# Patient Record
Sex: FEMALE | Race: WHITE | HISPANIC OR LATINO | Employment: OTHER | URBAN - METROPOLITAN AREA
[De-identification: names, ages, dates, MRNs, and addresses within clinical notes are randomized per-mention and may not be internally consistent; named-entity substitution may affect disease eponyms.]

---

## 2017-03-02 RX ORDER — SIMVASTATIN 20 MG/1
TABLET, FILM COATED ORAL
Qty: 90 TABLET | Refills: 0 | Status: SHIPPED | OUTPATIENT
Start: 2017-03-02 | End: 2017-05-15 | Stop reason: SDUPTHER

## 2017-03-15 ENCOUNTER — PATIENT OUTREACH (OUTPATIENT)
Dept: ADMINISTRATIVE | Facility: HOSPITAL | Age: 72
End: 2017-03-15

## 2017-03-29 ENCOUNTER — OFFICE VISIT (OUTPATIENT)
Dept: INTERNAL MEDICINE | Facility: CLINIC | Age: 72
End: 2017-03-29
Payer: MEDICARE

## 2017-03-29 VITALS
OXYGEN SATURATION: 97 % | HEIGHT: 64 IN | HEART RATE: 72 BPM | WEIGHT: 136.25 LBS | BODY MASS INDEX: 23.26 KG/M2 | SYSTOLIC BLOOD PRESSURE: 116 MMHG | DIASTOLIC BLOOD PRESSURE: 74 MMHG

## 2017-03-29 DIAGNOSIS — Z12.11 COLON CANCER SCREENING: ICD-10-CM

## 2017-03-29 DIAGNOSIS — E78.2 MIXED HYPERLIPIDEMIA: Primary | Chronic | ICD-10-CM

## 2017-03-29 DIAGNOSIS — Z12.31 ENCOUNTER FOR SCREENING MAMMOGRAM FOR BREAST CANCER: ICD-10-CM

## 2017-03-29 PROCEDURE — 99213 OFFICE O/P EST LOW 20 MIN: CPT | Mod: S$GLB,,, | Performed by: INTERNAL MEDICINE

## 2017-03-29 PROCEDURE — 99999 PR PBB SHADOW E&M-EST. PATIENT-LVL III: CPT | Mod: PBBFAC,,, | Performed by: INTERNAL MEDICINE

## 2017-03-29 PROCEDURE — 1160F RVW MEDS BY RX/DR IN RCRD: CPT | Mod: S$GLB,,, | Performed by: INTERNAL MEDICINE

## 2017-03-29 PROCEDURE — 1159F MED LIST DOCD IN RCRD: CPT | Mod: S$GLB,,, | Performed by: INTERNAL MEDICINE

## 2017-03-29 PROCEDURE — 99499 UNLISTED E&M SERVICE: CPT | Mod: S$PBB,,, | Performed by: INTERNAL MEDICINE

## 2017-03-29 PROCEDURE — 1126F AMNT PAIN NOTED NONE PRSNT: CPT | Mod: S$GLB,,, | Performed by: INTERNAL MEDICINE

## 2017-03-29 PROCEDURE — 1157F ADVNC CARE PLAN IN RCRD: CPT | Mod: S$GLB,,, | Performed by: INTERNAL MEDICINE

## 2017-03-29 RX ORDER — NAPROXEN SODIUM 220 MG
TABLET ORAL
COMMUNITY
Start: 2017-02-01 | End: 2021-01-29

## 2017-03-29 RX ORDER — LORATADINE 10 MG/1
TABLET ORAL
COMMUNITY
Start: 2017-02-20 | End: 2018-03-21

## 2017-03-29 RX ORDER — ACETAMINOPHEN 500 MG
500 TABLET ORAL EVERY 6 HOURS PRN
COMMUNITY

## 2017-03-29 NOTE — PROGRESS NOTES
Subjective:       Patient ID: Torie Richard is a 71 y.o. female.    Chief Complaint: Annual Exam    HPI Comments: Pt here for f/u. HLD has been well controlled and tolerating zocor well. Due for labs. Counseled on exercise goals. She is due for mammogram. She again declines c-scope but is willing to do stool cards. She declines other screening modalities for colon ca. Understands r/b. Still using otc sleep aids with relief. Proper use again d/w pt and she understands.      Hyperlipidemia   Pertinent negatives include no chest pain or shortness of breath.     Review of Systems   Constitutional: Negative for unexpected weight change.   Eyes: Negative for visual disturbance.   Respiratory: Negative for shortness of breath.    Cardiovascular: Negative for chest pain.   Gastrointestinal: Negative for abdominal pain and blood in stool.   Neurological: Negative for headaches.   Psychiatric/Behavioral: Negative for dysphoric mood.       Objective:      Physical Exam   Constitutional: She is oriented to person, place, and time. She appears well-developed and well-nourished.   HENT:   Head: Normocephalic and atraumatic.   Neck: Neck supple. No thyromegaly present.   Cardiovascular: Normal rate, regular rhythm and normal heart sounds.    Pulmonary/Chest: Effort normal and breath sounds normal.   Abdominal: Soft. Bowel sounds are normal. She exhibits no distension and no mass. There is no tenderness.   Musculoskeletal: She exhibits no edema.   Lymphadenopathy:     She has no cervical adenopathy.   Neurological: She is alert and oriented to person, place, and time. No cranial nerve deficit.   Psychiatric: She has a normal mood and affect. Her behavior is normal.       Assessment:       1. Mixed hyperlipidemia    2. Encounter for screening mammogram for breast cancer    3. Colon cancer screening        Plan:       1. appropriate labs    2. Mammogram  3. Pt declines c-scope but will do stool cards.

## 2017-04-03 ENCOUNTER — PATIENT MESSAGE (OUTPATIENT)
Dept: ADMINISTRATIVE | Facility: HOSPITAL | Age: 72
End: 2017-04-03

## 2017-04-03 ENCOUNTER — LAB VISIT (OUTPATIENT)
Dept: LAB | Facility: OTHER | Age: 72
End: 2017-04-03
Attending: INTERNAL MEDICINE
Payer: MEDICARE

## 2017-04-03 DIAGNOSIS — Z12.11 COLON CANCER SCREENING: ICD-10-CM

## 2017-04-03 PROCEDURE — 82272 OCCULT BLD FECES 1-3 TESTS: CPT | Mod: 91

## 2017-04-04 LAB
OB PNL STL: NEGATIVE

## 2017-05-15 RX ORDER — SIMVASTATIN 20 MG/1
20 TABLET, FILM COATED ORAL NIGHTLY
Qty: 90 TABLET | Refills: 3 | Status: SHIPPED | OUTPATIENT
Start: 2017-05-15 | End: 2018-05-06 | Stop reason: SDUPTHER

## 2017-05-16 ENCOUNTER — HOSPITAL ENCOUNTER (OUTPATIENT)
Dept: RADIOLOGY | Facility: OTHER | Age: 72
Discharge: HOME | End: 2017-05-16
Attending: INTERNAL MEDICINE
Payer: MEDICARE

## 2017-05-16 DIAGNOSIS — Z12.31 ENCOUNTER FOR SCREENING MAMMOGRAM FOR BREAST CANCER: ICD-10-CM

## 2017-05-16 PROCEDURE — 77067 SCR MAMMO BI INCL CAD: CPT | Mod: 26,,, | Performed by: RADIOLOGY

## 2017-05-16 PROCEDURE — 77067 SCR MAMMO BI INCL CAD: CPT | Mod: TC

## 2017-10-25 ENCOUNTER — PATIENT MESSAGE (OUTPATIENT)
Dept: INTERNAL MEDICINE | Facility: CLINIC | Age: 72
End: 2017-10-25

## 2018-03-14 ENCOUNTER — PATIENT OUTREACH (OUTPATIENT)
Dept: ADMINISTRATIVE | Facility: HOSPITAL | Age: 73
End: 2018-03-14

## 2018-03-14 NOTE — PROGRESS NOTES
[facility] is committed to your overall health.  To help you get the most out of each of your visits, we will review your information to make sure you are up to date on all of your recommended tests and/or procedures.       Your PCP  Bartolo Burciaga MD   found that you may be due for:       Health Maintenance Due   Topic Date Due    TETANUS VACCINE  07/13/1965    Zoster Vaccine  07/13/2007     Medicare does not cover all immunizations to be given in the clinic. Check your benefits to ensure that you do not need to receive your immunizations at the pharmacy.          If you have had any of the above done at another facility, please bring the records or information with you so that your record at [facility] will be complete.  If you would like to schedule any of these, please contact me.     If you are currently taking medication, please bring it with you to your appointment for review.     Also, if you have any type of Advanced Directives, please bring them with you to your office visit so we may scan them into your chart.       Thank you for Choosing [facility] for your healthcare needs.        Additional Information  If you have questions, you can email [email or web address] or call [phone]  to talk to our [facility] staff. Remember, [facility] is NOT to be used for urgent needs. For medical emergencies, dial 911.

## 2018-03-21 ENCOUNTER — DOCUMENTATION ONLY (OUTPATIENT)
Dept: INTERNAL MEDICINE | Facility: CLINIC | Age: 73
End: 2018-03-21

## 2018-03-21 ENCOUNTER — OFFICE VISIT (OUTPATIENT)
Dept: INTERNAL MEDICINE | Facility: CLINIC | Age: 73
End: 2018-03-21
Payer: MEDICARE

## 2018-03-21 VITALS
BODY MASS INDEX: 23.93 KG/M2 | HEART RATE: 62 BPM | WEIGHT: 140.19 LBS | SYSTOLIC BLOOD PRESSURE: 162 MMHG | DIASTOLIC BLOOD PRESSURE: 90 MMHG | HEIGHT: 64 IN

## 2018-03-21 DIAGNOSIS — Z12.12 SCREENING FOR COLORECTAL CANCER: ICD-10-CM

## 2018-03-21 DIAGNOSIS — E78.2 MIXED HYPERLIPIDEMIA: Primary | Chronic | ICD-10-CM

## 2018-03-21 DIAGNOSIS — Z12.39 SCREENING FOR BREAST CANCER: ICD-10-CM

## 2018-03-21 DIAGNOSIS — Z12.11 SCREENING FOR COLORECTAL CANCER: ICD-10-CM

## 2018-03-21 PROCEDURE — 99999 PR PBB SHADOW E&M-EST. PATIENT-LVL III: CPT | Mod: PBBFAC,,, | Performed by: INTERNAL MEDICINE

## 2018-03-21 PROCEDURE — 99213 OFFICE O/P EST LOW 20 MIN: CPT | Mod: S$GLB,,, | Performed by: INTERNAL MEDICINE

## 2018-03-21 PROCEDURE — 99499 UNLISTED E&M SERVICE: CPT | Mod: S$GLB,,, | Performed by: INTERNAL MEDICINE

## 2018-03-21 NOTE — PROGRESS NOTES
Subjective:       Patient ID: Torie Richard is a 72 y.o. female.    Chief Complaint: Annual Exam    Pt here for f/u. HLD has been well controlled and tolerating zocor well. Due for labs.     Counseled on exercise goals. She is due for mammogram. She again declines c-scope and other screening modalities for colon ca but is willing to do Fitkit. Understands r/b.     Still using otc sleep aids with relief. Proper use again d/w pt and she understands.    BP is elevated today. Has occasionally been elevated in the past but not consistently. She is not checking at home but will begin to do so. Denies cp/sob/ha/vision or neuro changes.         Hyperlipidemia   Pertinent negatives include no chest pain or shortness of breath.     Review of Systems   Constitutional: Negative for unexpected weight change.   Eyes: Negative for visual disturbance.   Respiratory: Negative for shortness of breath.    Cardiovascular: Negative for chest pain.   Gastrointestinal: Negative for abdominal pain and blood in stool.   Neurological: Negative for headaches.   Psychiatric/Behavioral: Negative for dysphoric mood.       Objective:      Physical Exam   Constitutional: She is oriented to person, place, and time. She appears well-developed and well-nourished.   HENT:   Head: Normocephalic and atraumatic.   Neck: Neck supple. No thyromegaly present.   Cardiovascular: Normal rate, regular rhythm and normal heart sounds.    Pulmonary/Chest: Effort normal and breath sounds normal.   Abdominal: Soft. Bowel sounds are normal. She exhibits no distension and no mass. There is no tenderness.   Musculoskeletal: She exhibits no edema.   Lymphadenopathy:     She has no cervical adenopathy.   Neurological: She is alert and oriented to person, place, and time. No cranial nerve deficit.   Psychiatric: She has a normal mood and affect. Her behavior is normal.       Assessment:       1. Mixed hyperlipidemia    2. Screening for colorectal cancer    3.  Screening for breast cancer        Plan:       1. appropriate labs    2. Mammogram  3. Fitkit  4. Home BP monitoring and f/u 2 wks nursing BP check; anticipate needing to start lisinopril at that time

## 2018-03-22 ENCOUNTER — PATIENT MESSAGE (OUTPATIENT)
Dept: INTERNAL MEDICINE | Facility: CLINIC | Age: 73
End: 2018-03-22

## 2018-03-23 ENCOUNTER — LAB VISIT (OUTPATIENT)
Dept: LAB | Facility: HOSPITAL | Age: 73
End: 2018-03-23
Attending: INTERNAL MEDICINE
Payer: MEDICARE

## 2018-03-23 DIAGNOSIS — Z12.11 SCREENING FOR COLORECTAL CANCER: ICD-10-CM

## 2018-03-23 DIAGNOSIS — Z12.12 SCREENING FOR COLORECTAL CANCER: ICD-10-CM

## 2018-03-23 LAB — HEMOCCULT STL QL IA: NEGATIVE

## 2018-03-23 PROCEDURE — 82274 ASSAY TEST FOR BLOOD FECAL: CPT

## 2018-04-05 ENCOUNTER — PATIENT MESSAGE (OUTPATIENT)
Dept: INTERNAL MEDICINE | Facility: CLINIC | Age: 73
End: 2018-04-05

## 2018-04-05 RX ORDER — LISINOPRIL 10 MG/1
10 TABLET ORAL DAILY
Qty: 90 TABLET | Refills: 3 | Status: SHIPPED | OUTPATIENT
Start: 2018-04-05 | End: 2019-03-14

## 2018-04-19 ENCOUNTER — CLINICAL SUPPORT (OUTPATIENT)
Dept: INTERNAL MEDICINE | Facility: CLINIC | Age: 73
End: 2018-04-19
Payer: MEDICARE

## 2018-04-19 VITALS — OXYGEN SATURATION: 98 % | SYSTOLIC BLOOD PRESSURE: 132 MMHG | DIASTOLIC BLOOD PRESSURE: 76 MMHG | HEART RATE: 63 BPM

## 2018-04-19 PROCEDURE — 99999 PR PBB SHADOW E&M-EST. PATIENT-LVL II: CPT | Mod: PBBFAC,,,

## 2018-04-19 NOTE — PROGRESS NOTES
Torie Richard 72 y.o. female is here today for Blood Pressure check.   History of HTN yes.    Review of patient's allergies indicates:   Allergen Reactions    Meloxicam Rash     Flushing       Creatinine   Date Value Ref Range Status   05/16/2017 0.8 0.5 - 1.4 mg/dL Final     Sodium   Date Value Ref Range Status   05/16/2017 141 136 - 145 mmol/L Final     Potassium   Date Value Ref Range Status   05/16/2017 4.7 3.5 - 5.1 mmol/L Final   ]  Patient verifies taking blood pressure medications on a regular bases at the same time of the day.     Current Outpatient Prescriptions:     acetaminophen (TYLENOL) 500 MG tablet, Take 500 mg by mouth every 6 (six) hours as needed for Pain., Disp: , Rfl:     doxylamine succinate (SLEEP AID, DOXYLAMINE,) 25 mg tablet, Take 25 mg by mouth nightly as needed.  , Disp: , Rfl:     ibuprofen (ADVIL,MOTRIN) 200 MG tablet, Take 200 mg by mouth every 6 (six) hours as needed., Disp: , Rfl:     lisinopril 10 MG tablet, Take 1 tablet (10 mg total) by mouth once daily., Disp: 90 tablet, Rfl: 3    melatonin 5 mg Tab, Take 5 mg by mouth. , Disp: , Rfl:     naproxen sodium (ANAPROX) 220 MG tablet, , Disp: , Rfl:     simvastatin (ZOCOR) 20 MG tablet, Take 1 tablet (20 mg total) by mouth every evening., Disp: 90 tablet, Rfl: 3  Does patient have record of home blood pressure readings yes. Readings have been averaging 126/70.   Last dose of blood pressure medication was taken at 730am.  Patient is asymptomatic.       BP: 132/76 , Pulse: 63.      Dr. Burciaga notified.   Pt comes in for bp check and bp is within normal range

## 2018-04-23 ENCOUNTER — LAB VISIT (OUTPATIENT)
Dept: LAB | Facility: OTHER | Age: 73
End: 2018-04-23
Attending: INTERNAL MEDICINE
Payer: MEDICARE

## 2018-04-23 DIAGNOSIS — E78.2 MIXED HYPERLIPIDEMIA: Chronic | ICD-10-CM

## 2018-04-23 LAB
ALBUMIN SERPL BCP-MCNC: 4 G/DL
ALP SERPL-CCNC: 49 U/L
ALT SERPL W/O P-5'-P-CCNC: 16 U/L
ANION GAP SERPL CALC-SCNC: 8 MMOL/L
AST SERPL-CCNC: 16 U/L
BILIRUB SERPL-MCNC: 0.5 MG/DL
BUN SERPL-MCNC: 10 MG/DL
CALCIUM SERPL-MCNC: 9.7 MG/DL
CHLORIDE SERPL-SCNC: 107 MMOL/L
CHOLEST SERPL-MCNC: 160 MG/DL
CHOLEST/HDLC SERPL: 2.7 {RATIO}
CO2 SERPL-SCNC: 26 MMOL/L
CREAT SERPL-MCNC: 0.8 MG/DL
EST. GFR  (AFRICAN AMERICAN): >60 ML/MIN/1.73 M^2
EST. GFR  (NON AFRICAN AMERICAN): >60 ML/MIN/1.73 M^2
GLUCOSE SERPL-MCNC: 102 MG/DL
HDLC SERPL-MCNC: 60 MG/DL
HDLC SERPL: 37.5 %
LDLC SERPL CALC-MCNC: 83 MG/DL
NONHDLC SERPL-MCNC: 100 MG/DL
POTASSIUM SERPL-SCNC: 4.2 MMOL/L
PROT SERPL-MCNC: 6.6 G/DL
SODIUM SERPL-SCNC: 141 MMOL/L
TRIGL SERPL-MCNC: 85 MG/DL

## 2018-04-23 PROCEDURE — 36415 COLL VENOUS BLD VENIPUNCTURE: CPT

## 2018-04-23 PROCEDURE — 80053 COMPREHEN METABOLIC PANEL: CPT

## 2018-04-23 PROCEDURE — 80061 LIPID PANEL: CPT

## 2018-05-06 RX ORDER — SIMVASTATIN 20 MG/1
20 TABLET, FILM COATED ORAL NIGHTLY
Qty: 90 TABLET | Refills: 3 | Status: SHIPPED | OUTPATIENT
Start: 2018-05-06 | End: 2019-04-25 | Stop reason: SDUPTHER

## 2018-05-17 ENCOUNTER — HOSPITAL ENCOUNTER (OUTPATIENT)
Dept: RADIOLOGY | Facility: OTHER | Age: 73
Discharge: HOME | End: 2018-05-17
Attending: INTERNAL MEDICINE
Payer: MEDICARE

## 2018-05-17 VITALS — BODY MASS INDEX: 23.9 KG/M2 | WEIGHT: 140 LBS | HEIGHT: 64 IN

## 2018-05-17 DIAGNOSIS — Z12.39 SCREENING FOR BREAST CANCER: ICD-10-CM

## 2018-05-17 PROCEDURE — 77063 BREAST TOMOSYNTHESIS BI: CPT | Mod: 26,,, | Performed by: RADIOLOGY

## 2018-05-17 PROCEDURE — 77067 SCR MAMMO BI INCL CAD: CPT | Mod: 26,,, | Performed by: RADIOLOGY

## 2018-05-17 PROCEDURE — 77067 SCR MAMMO BI INCL CAD: CPT | Mod: TC

## 2019-01-29 ENCOUNTER — PATIENT MESSAGE (OUTPATIENT)
Dept: INTERNAL MEDICINE | Facility: CLINIC | Age: 74
End: 2019-01-29

## 2019-02-03 ENCOUNTER — PATIENT MESSAGE (OUTPATIENT)
Dept: INTERNAL MEDICINE | Facility: CLINIC | Age: 74
End: 2019-02-03

## 2019-02-03 DIAGNOSIS — L98.9 SKIN LESIONS: Primary | ICD-10-CM

## 2019-02-05 ENCOUNTER — PATIENT MESSAGE (OUTPATIENT)
Dept: INTERNAL MEDICINE | Facility: CLINIC | Age: 74
End: 2019-02-05

## 2019-02-14 ENCOUNTER — INITIAL CONSULT (OUTPATIENT)
Dept: DERMATOLOGY | Facility: CLINIC | Age: 74
End: 2019-02-14
Payer: MEDICARE

## 2019-02-14 DIAGNOSIS — L24.9 IRRITANT CONTACT DERMATITIS, UNSPECIFIED TRIGGER: Primary | ICD-10-CM

## 2019-02-14 DIAGNOSIS — D36.10 NEUROFIBROMA: ICD-10-CM

## 2019-02-14 PROCEDURE — 1101F PT FALLS ASSESS-DOCD LE1/YR: CPT | Mod: CPTII,S$GLB,, | Performed by: DERMATOLOGY

## 2019-02-14 PROCEDURE — 1101F PR PT FALLS ASSESS DOC 0-1 FALLS W/OUT INJ PAST YR: ICD-10-PCS | Mod: CPTII,S$GLB,, | Performed by: DERMATOLOGY

## 2019-02-14 PROCEDURE — 99999 PR PBB SHADOW E&M-EST. PATIENT-LVL II: CPT | Mod: PBBFAC,,, | Performed by: DERMATOLOGY

## 2019-02-14 PROCEDURE — 99202 OFFICE O/P NEW SF 15 MIN: CPT | Mod: S$GLB,,, | Performed by: DERMATOLOGY

## 2019-02-14 PROCEDURE — 99999 PR PBB SHADOW E&M-EST. PATIENT-LVL II: ICD-10-PCS | Mod: PBBFAC,,, | Performed by: DERMATOLOGY

## 2019-02-14 PROCEDURE — 99202 PR OFFICE/OUTPT VISIT, NEW, LEVL II, 15-29 MIN: ICD-10-PCS | Mod: S$GLB,,, | Performed by: DERMATOLOGY

## 2019-02-14 RX ORDER — ALCLOMETASONE DIPROPIONATE 0.5 MG/G
OINTMENT TOPICAL
Qty: 30 G | Refills: 3 | Status: SHIPPED | OUTPATIENT
Start: 2019-02-14 | End: 2019-05-05

## 2019-02-14 NOTE — LETTER
February 14, 2019      Bartolo Burciaga MD  [address]  [facility] [address]           [facility]ermat[facility]  [address]  [facility] [address]  Phone: [phone]  Fax: [phone]          Patient: Torie Richard   MR Number: [id number]   YOB: 1945   Date of Visit: 2/14/2019       Dear Dr. Bartolo Burciaga:    Thank you for referring Torie Richard to me for evaluation. Attached you will find relevant portions of my assessment and plan of care.    If you have questions, please do not hesitate to call me. I look forward to following Torie Richard along with you.    Sincerely,    Tierney Peña MD    Enclosure  CC:  No Recipients    If you would like to receive this communication electronically, please contact [email or web address] or [phone] to request more information on [employer] access.    For providers and/or their staff who would like to refer a patient to [facility], please contact us through our one-stop-shop provider referral line, [facility], at [phone].    If you feel you have received this communication in error or would no longer like to receive these types of communications, please e-mail [email or web address]

## 2019-02-14 NOTE — PATIENT INSTRUCTIONS
It is recommended that patient discontinue all lip balms and use only Vaseline petroleum jelly applied frequently to lips.

## 2019-03-08 ENCOUNTER — TELEPHONE (OUTPATIENT)
Dept: INTERNAL MEDICINE | Facility: CLINIC | Age: 74
End: 2019-03-08

## 2019-03-08 PROBLEM — I10 ESSENTIAL HYPERTENSION, BENIGN: Status: ACTIVE | Noted: 2019-03-08

## 2019-03-13 ENCOUNTER — PES CALL (OUTPATIENT)
Dept: ADMINISTRATIVE | Facility: CLINIC | Age: 74
End: 2019-03-13

## 2019-03-14 ENCOUNTER — LAB VISIT (OUTPATIENT)
Dept: LAB | Facility: OTHER | Age: 74
End: 2019-03-14
Attending: INTERNAL MEDICINE
Payer: MEDICARE

## 2019-03-14 ENCOUNTER — OFFICE VISIT (OUTPATIENT)
Dept: INTERNAL MEDICINE | Facility: CLINIC | Age: 74
End: 2019-03-14
Payer: MEDICARE

## 2019-03-14 VITALS
WEIGHT: 137.13 LBS | BODY MASS INDEX: 23.41 KG/M2 | DIASTOLIC BLOOD PRESSURE: 74 MMHG | SYSTOLIC BLOOD PRESSURE: 120 MMHG | HEART RATE: 64 BPM | HEIGHT: 64 IN

## 2019-03-14 DIAGNOSIS — E78.2 MIXED HYPERLIPIDEMIA: Chronic | ICD-10-CM

## 2019-03-14 DIAGNOSIS — I10 ESSENTIAL HYPERTENSION, BENIGN: ICD-10-CM

## 2019-03-14 DIAGNOSIS — Z12.11 COLON CANCER SCREENING: ICD-10-CM

## 2019-03-14 DIAGNOSIS — I10 ESSENTIAL HYPERTENSION, BENIGN: Primary | ICD-10-CM

## 2019-03-14 LAB
ALBUMIN SERPL BCP-MCNC: 4.1 G/DL
ALP SERPL-CCNC: 57 U/L
ALT SERPL W/O P-5'-P-CCNC: 15 U/L
ANION GAP SERPL CALC-SCNC: 8 MMOL/L
AST SERPL-CCNC: 21 U/L
BASOPHILS # BLD AUTO: 0.01 K/UL
BASOPHILS NFR BLD: 0.2 %
BILIRUB SERPL-MCNC: 0.6 MG/DL
BUN SERPL-MCNC: 12 MG/DL
CALCIUM SERPL-MCNC: 9.5 MG/DL
CHLORIDE SERPL-SCNC: 106 MMOL/L
CHOLEST SERPL-MCNC: 156 MG/DL
CHOLEST/HDLC SERPL: 2.6 {RATIO}
CO2 SERPL-SCNC: 25 MMOL/L
CREAT SERPL-MCNC: 0.8 MG/DL
DIFFERENTIAL METHOD: ABNORMAL
EOSINOPHIL # BLD AUTO: 0.2 K/UL
EOSINOPHIL NFR BLD: 4.4 %
ERYTHROCYTE [DISTWIDTH] IN BLOOD BY AUTOMATED COUNT: 12.9 %
EST. GFR  (AFRICAN AMERICAN): >60 ML/MIN/1.73 M^2
EST. GFR  (NON AFRICAN AMERICAN): >60 ML/MIN/1.73 M^2
GLUCOSE SERPL-MCNC: 85 MG/DL
HCT VFR BLD AUTO: 40 %
HDLC SERPL-MCNC: 60 MG/DL
HDLC SERPL: 38.5 %
HGB BLD-MCNC: 13.2 G/DL
LDLC SERPL CALC-MCNC: 78 MG/DL
LYMPHOCYTES # BLD AUTO: 2 K/UL
LYMPHOCYTES NFR BLD: 39.4 %
MCH RBC QN AUTO: 31.1 PG
MCHC RBC AUTO-ENTMCNC: 33 G/DL
MCV RBC AUTO: 94 FL
MONOCYTES # BLD AUTO: 0.4 K/UL
MONOCYTES NFR BLD: 8.8 %
NEUTROPHILS # BLD AUTO: 2.4 K/UL
NEUTROPHILS NFR BLD: 47.2 %
NONHDLC SERPL-MCNC: 96 MG/DL
PLATELET # BLD AUTO: 269 K/UL
PMV BLD AUTO: 10.2 FL
POTASSIUM SERPL-SCNC: 4.6 MMOL/L
PROT SERPL-MCNC: 6.8 G/DL
RBC # BLD AUTO: 4.25 M/UL
SODIUM SERPL-SCNC: 139 MMOL/L
TRIGL SERPL-MCNC: 90 MG/DL
WBC # BLD AUTO: 5 K/UL

## 2019-03-14 PROCEDURE — 99999 PR PBB SHADOW E&M-EST. PATIENT-LVL III: CPT | Mod: PBBFAC,,, | Performed by: INTERNAL MEDICINE

## 2019-03-14 PROCEDURE — 99499 UNLISTED E&M SERVICE: CPT | Mod: S$GLB,,, | Performed by: INTERNAL MEDICINE

## 2019-03-14 PROCEDURE — 85025 COMPLETE CBC W/AUTO DIFF WBC: CPT

## 2019-03-14 PROCEDURE — 36415 COLL VENOUS BLD VENIPUNCTURE: CPT

## 2019-03-14 PROCEDURE — 99999 PR PBB SHADOW E&M-EST. PATIENT-LVL III: ICD-10-PCS | Mod: PBBFAC,,, | Performed by: INTERNAL MEDICINE

## 2019-03-14 PROCEDURE — 3078F DIAST BP <80 MM HG: CPT | Mod: CPTII,S$GLB,, | Performed by: INTERNAL MEDICINE

## 2019-03-14 PROCEDURE — 99214 PR OFFICE/OUTPT VISIT, EST, LEVL IV, 30-39 MIN: ICD-10-PCS | Mod: S$GLB,,, | Performed by: INTERNAL MEDICINE

## 2019-03-14 PROCEDURE — 3074F SYST BP LT 130 MM HG: CPT | Mod: CPTII,S$GLB,, | Performed by: INTERNAL MEDICINE

## 2019-03-14 PROCEDURE — 99499 RISK ADDL DX/OHS AUDIT: ICD-10-PCS | Mod: S$GLB,,, | Performed by: INTERNAL MEDICINE

## 2019-03-14 PROCEDURE — 99214 OFFICE O/P EST MOD 30 MIN: CPT | Mod: S$GLB,,, | Performed by: INTERNAL MEDICINE

## 2019-03-14 PROCEDURE — 1101F PR PT FALLS ASSESS DOC 0-1 FALLS W/OUT INJ PAST YR: ICD-10-PCS | Mod: CPTII,S$GLB,, | Performed by: INTERNAL MEDICINE

## 2019-03-14 PROCEDURE — 3074F PR MOST RECENT SYSTOLIC BLOOD PRESSURE < 130 MM HG: ICD-10-PCS | Mod: CPTII,S$GLB,, | Performed by: INTERNAL MEDICINE

## 2019-03-14 PROCEDURE — 80061 LIPID PANEL: CPT

## 2019-03-14 PROCEDURE — 80053 COMPREHEN METABOLIC PANEL: CPT

## 2019-03-14 PROCEDURE — 3078F PR MOST RECENT DIASTOLIC BLOOD PRESSURE < 80 MM HG: ICD-10-PCS | Mod: CPTII,S$GLB,, | Performed by: INTERNAL MEDICINE

## 2019-03-14 PROCEDURE — 1101F PT FALLS ASSESS-DOCD LE1/YR: CPT | Mod: CPTII,S$GLB,, | Performed by: INTERNAL MEDICINE

## 2019-03-14 RX ORDER — LOSARTAN POTASSIUM 25 MG/1
25 TABLET ORAL DAILY
Qty: 90 TABLET | Refills: 3 | Status: SHIPPED | OUTPATIENT
Start: 2019-03-14 | End: 2020-04-03

## 2019-03-14 NOTE — PROGRESS NOTES
Subjective:       Patient ID: Torie Richard is a 73 y.o. female.    Chief Complaint: Annual Exam    Pt here for f/u. HLD has been well controlled and tolerating zocor well. Due for labs.     Counseled on exercise goals. She again declines c-scope and other screening modalities for colon ca but is willing to do Fitkit. Understands r/b.     Still using otc sleep aids with relief. Proper use again d/w pt and she understands.    Pt's BP is well controlled. Tolerating meds well. Pt denies cp/sob/ha/vision or neuro changes. Checking at home and is well controlled.           Hyperlipidemia   Pertinent negatives include no chest pain or shortness of breath.     Review of Systems   Constitutional: Negative for activity change and unexpected weight change.   HENT: Negative for hearing loss, rhinorrhea and trouble swallowing.    Eyes: Negative for discharge and visual disturbance.   Respiratory: Negative for chest tightness, shortness of breath and wheezing.    Cardiovascular: Negative for chest pain and palpitations.   Gastrointestinal: Negative for abdominal pain, blood in stool, constipation, diarrhea and vomiting.   Endocrine: Negative for polydipsia and polyuria.   Genitourinary: Negative for difficulty urinating, dysuria, hematuria and menstrual problem.   Musculoskeletal: Negative for arthralgias, joint swelling and neck pain.   Neurological: Negative for weakness and headaches.   Psychiatric/Behavioral: Negative for confusion and dysphoric mood.       Objective:      Physical Exam   Constitutional: She is oriented to person, place, and time. She appears well-developed and well-nourished.   HENT:   Head: Normocephalic and atraumatic.   Neck: Neck supple. No thyromegaly present.   Cardiovascular: Normal rate, regular rhythm and normal heart sounds.   Pulmonary/Chest: Effort normal and breath sounds normal.   Abdominal: Soft. Bowel sounds are normal. She exhibits no distension and no mass. There is no tenderness.    Musculoskeletal: She exhibits no edema.   Lymphadenopathy:     She has no cervical adenopathy.   Neurological: She is alert and oriented to person, place, and time. No cranial nerve deficit.   Psychiatric: She has a normal mood and affect. Her behavior is normal.       Assessment:       1. Essential hypertension, benign    2. Mixed hyperlipidemia    3. Colon cancer screening        Plan:       1. appropriate labs    2. Change lisinopril to losartan considering recent study linking ACEI and lung ca  3. Fitkit  4. Home BP monitoring

## 2019-03-15 ENCOUNTER — LAB VISIT (OUTPATIENT)
Dept: LAB | Facility: OTHER | Age: 74
End: 2019-03-15
Attending: INTERNAL MEDICINE
Payer: MEDICARE

## 2019-03-15 DIAGNOSIS — Z12.11 COLON CANCER SCREENING: ICD-10-CM

## 2019-03-15 PROCEDURE — 82274 ASSAY TEST FOR BLOOD FECAL: CPT

## 2019-03-19 RX ORDER — LISINOPRIL 10 MG/1
10 TABLET ORAL DAILY
Qty: 90 TABLET | Refills: 3 | OUTPATIENT
Start: 2019-03-19 | End: 2020-03-18

## 2019-03-21 LAB — HEMOCCULT STL QL IA: NEGATIVE

## 2019-04-25 RX ORDER — SIMVASTATIN 20 MG/1
20 TABLET, FILM COATED ORAL NIGHTLY
Qty: 90 TABLET | Refills: 3 | Status: SHIPPED | OUTPATIENT
Start: 2019-04-25 | End: 2020-05-19

## 2019-05-05 ENCOUNTER — HOSPITAL ENCOUNTER (INPATIENT)
Facility: HOSPITAL | Age: 74
LOS: 6 days | Discharge: HOME | DRG: 418 | End: 2019-05-11
Attending: EMERGENCY MEDICINE | Admitting: SURGERY
Payer: MEDICARE

## 2019-05-05 ENCOUNTER — OFFICE VISIT (OUTPATIENT)
Dept: URGENT CARE | Facility: CLINIC | Age: 74
End: 2019-05-05
Payer: MEDICARE

## 2019-05-05 VITALS
TEMPERATURE: 99 F | DIASTOLIC BLOOD PRESSURE: 88 MMHG | SYSTOLIC BLOOD PRESSURE: 189 MMHG | RESPIRATION RATE: 16 BRPM | WEIGHT: 137 LBS | HEART RATE: 87 BPM | HEIGHT: 64 IN | BODY MASS INDEX: 23.39 KG/M2 | OXYGEN SATURATION: 98 %

## 2019-05-05 DIAGNOSIS — E78.2 MIXED HYPERLIPIDEMIA: Chronic | ICD-10-CM

## 2019-05-05 DIAGNOSIS — R10.13 EPIGASTRIC ABDOMINAL PAIN: ICD-10-CM

## 2019-05-05 DIAGNOSIS — K80.33 CALCULUS OF BILE DUCT WITH ACUTE CHOLANGITIS WITH OBSTRUCTION: ICD-10-CM

## 2019-05-05 DIAGNOSIS — K80.51 CHOLEDOCHOLITHIASIS WITH OBSTRUCTION: ICD-10-CM

## 2019-05-05 DIAGNOSIS — R74.01 TRANSAMINITIS: ICD-10-CM

## 2019-05-05 DIAGNOSIS — K83.1 BILIARY OBSTRUCTION: Primary | ICD-10-CM

## 2019-05-05 DIAGNOSIS — R10.11 RIGHT UPPER QUADRANT ABDOMINAL PAIN: Primary | ICD-10-CM

## 2019-05-05 DIAGNOSIS — I10 ESSENTIAL HYPERTENSION, BENIGN: ICD-10-CM

## 2019-05-05 DIAGNOSIS — R93.5 ABNORMAL US (ULTRASOUND) OF ABDOMEN: ICD-10-CM

## 2019-05-05 LAB
ALBUMIN SERPL BCP-MCNC: 3.7 G/DL (ref 3.5–5.2)
ALP SERPL-CCNC: 301 U/L (ref 55–135)
ALT SERPL W/O P-5'-P-CCNC: 775 U/L (ref 10–44)
ANION GAP SERPL CALC-SCNC: 10 MMOL/L (ref 8–16)
AST SERPL-CCNC: 292 U/L (ref 10–40)
BACTERIA #/AREA URNS AUTO: ABNORMAL /HPF
BASOPHILS # BLD AUTO: 0.04 K/UL (ref 0–0.2)
BASOPHILS NFR BLD: 0.5 % (ref 0–1.9)
BILIRUB SERPL-MCNC: 5.4 MG/DL (ref 0.1–1)
BILIRUB UR QL STRIP: ABNORMAL
BUN SERPL-MCNC: 13 MG/DL (ref 8–23)
CALCIUM SERPL-MCNC: 9.5 MG/DL (ref 8.7–10.5)
CHLORIDE SERPL-SCNC: 109 MMOL/L (ref 95–110)
CLARITY UR REFRACT.AUTO: CLEAR
CO2 SERPL-SCNC: 21 MMOL/L (ref 23–29)
COLOR UR AUTO: ABNORMAL
CREAT SERPL-MCNC: 0.7 MG/DL (ref 0.5–1.4)
DIFFERENTIAL METHOD: ABNORMAL
EOSINOPHIL # BLD AUTO: 0.3 K/UL (ref 0–0.5)
EOSINOPHIL NFR BLD: 3.5 % (ref 0–8)
ERYTHROCYTE [DISTWIDTH] IN BLOOD BY AUTOMATED COUNT: 12.9 % (ref 11.5–14.5)
EST. GFR  (AFRICAN AMERICAN): >60 ML/MIN/1.73 M^2
EST. GFR  (NON AFRICAN AMERICAN): >60 ML/MIN/1.73 M^2
GLUCOSE SERPL-MCNC: 95 MG/DL (ref 70–110)
GLUCOSE UR QL STRIP: NEGATIVE
HCT VFR BLD AUTO: 44.2 % (ref 37–48.5)
HGB BLD-MCNC: 15 G/DL (ref 12–16)
HGB UR QL STRIP: ABNORMAL
HYALINE CASTS UR QL AUTO: 0 /LPF
IMM GRANULOCYTES # BLD AUTO: 0.03 K/UL (ref 0–0.04)
IMM GRANULOCYTES NFR BLD AUTO: 0.4 % (ref 0–0.5)
KETONES UR QL STRIP: NEGATIVE
LEUKOCYTE ESTERASE UR QL STRIP: NEGATIVE
LIPASE SERPL-CCNC: 69 U/L (ref 4–60)
LYMPHOCYTES # BLD AUTO: 1.7 K/UL (ref 1–4.8)
LYMPHOCYTES NFR BLD: 21.7 % (ref 18–48)
MCH RBC QN AUTO: 31.3 PG (ref 27–31)
MCHC RBC AUTO-ENTMCNC: 33.9 G/DL (ref 32–36)
MCV RBC AUTO: 92 FL (ref 82–98)
MICROSCOPIC COMMENT: ABNORMAL
MONOCYTES # BLD AUTO: 0.7 K/UL (ref 0.3–1)
MONOCYTES NFR BLD: 8.9 % (ref 4–15)
NEUTROPHILS # BLD AUTO: 5.1 K/UL (ref 1.8–7.7)
NEUTROPHILS NFR BLD: 65 % (ref 38–73)
NITRITE UR QL STRIP: NEGATIVE
NRBC BLD-RTO: 0 /100 WBC
PH UR STRIP: 5 [PH] (ref 5–8)
PLATELET # BLD AUTO: 320 K/UL (ref 150–350)
PMV BLD AUTO: 10.7 FL (ref 9.2–12.9)
POTASSIUM SERPL-SCNC: 3.4 MMOL/L (ref 3.5–5.1)
PROT SERPL-MCNC: 6.8 G/DL (ref 6–8.4)
PROT UR QL STRIP: ABNORMAL
RBC # BLD AUTO: 4.8 M/UL (ref 4–5.4)
RBC #/AREA URNS AUTO: 7 /HPF (ref 0–4)
SODIUM SERPL-SCNC: 140 MMOL/L (ref 136–145)
SP GR UR STRIP: >=1.03 (ref 1–1.03)
SQUAMOUS #/AREA URNS AUTO: 1 /HPF
TROPONIN I SERPL DL<=0.01 NG/ML-MCNC: <0.006 NG/ML (ref 0–0.03)
URN SPEC COLLECT METH UR: ABNORMAL
WBC # BLD AUTO: 7.78 K/UL (ref 3.9–12.7)
WBC #/AREA URNS AUTO: 5 /HPF (ref 0–5)

## 2019-05-05 PROCEDURE — 96374 THER/PROPH/DIAG INJ IV PUSH: CPT

## 2019-05-05 PROCEDURE — 99214 OFFICE O/P EST MOD 30 MIN: CPT | Mod: S$GLB,,, | Performed by: INTERNAL MEDICINE

## 2019-05-05 PROCEDURE — 80053 COMPREHEN METABOLIC PANEL: CPT

## 2019-05-05 PROCEDURE — 20600001 HC STEP DOWN PRIVATE ROOM

## 2019-05-05 PROCEDURE — 99285 PR EMERGENCY DEPT VISIT,LEVEL V: ICD-10-PCS | Mod: ,,, | Performed by: EMERGENCY MEDICINE

## 2019-05-05 PROCEDURE — 93010 EKG 12-LEAD: ICD-10-PCS | Mod: ,,, | Performed by: INTERNAL MEDICINE

## 2019-05-05 PROCEDURE — 3079F PR MOST RECENT DIASTOLIC BLOOD PRESSURE 80-89 MM HG: ICD-10-PCS | Mod: CPTII,S$GLB,, | Performed by: INTERNAL MEDICINE

## 2019-05-05 PROCEDURE — 3077F PR MOST RECENT SYSTOLIC BLOOD PRESSURE >= 140 MM HG: ICD-10-PCS | Mod: CPTII,S$GLB,, | Performed by: INTERNAL MEDICINE

## 2019-05-05 PROCEDURE — 84484 ASSAY OF TROPONIN QUANT: CPT

## 2019-05-05 PROCEDURE — 12000002 HC ACUTE/MED SURGE SEMI-PRIVATE ROOM

## 2019-05-05 PROCEDURE — 93010 ELECTROCARDIOGRAM REPORT: CPT | Mod: ,,, | Performed by: INTERNAL MEDICINE

## 2019-05-05 PROCEDURE — 96375 TX/PRO/DX INJ NEW DRUG ADDON: CPT

## 2019-05-05 PROCEDURE — 25000003 PHARM REV CODE 250: Performed by: PHYSICIAN ASSISTANT

## 2019-05-05 PROCEDURE — 3079F DIAST BP 80-89 MM HG: CPT | Mod: CPTII,S$GLB,, | Performed by: INTERNAL MEDICINE

## 2019-05-05 PROCEDURE — 63600175 PHARM REV CODE 636 W HCPCS: Performed by: PHYSICIAN ASSISTANT

## 2019-05-05 PROCEDURE — 1101F PT FALLS ASSESS-DOCD LE1/YR: CPT | Mod: CPTII,S$GLB,, | Performed by: INTERNAL MEDICINE

## 2019-05-05 PROCEDURE — 81001 URINALYSIS AUTO W/SCOPE: CPT

## 2019-05-05 PROCEDURE — 83690 ASSAY OF LIPASE: CPT

## 2019-05-05 PROCEDURE — S0028 INJECTION, FAMOTIDINE, 20 MG: HCPCS | Performed by: PHYSICIAN ASSISTANT

## 2019-05-05 PROCEDURE — 99285 EMERGENCY DEPT VISIT HI MDM: CPT | Mod: 25

## 2019-05-05 PROCEDURE — 99285 EMERGENCY DEPT VISIT HI MDM: CPT | Mod: ,,, | Performed by: EMERGENCY MEDICINE

## 2019-05-05 PROCEDURE — 85025 COMPLETE CBC W/AUTO DIFF WBC: CPT

## 2019-05-05 PROCEDURE — 93005 ELECTROCARDIOGRAM TRACING: CPT

## 2019-05-05 PROCEDURE — A4216 STERILE WATER/SALINE, 10 ML: HCPCS | Performed by: SURGERY

## 2019-05-05 PROCEDURE — 1101F PR PT FALLS ASSESS DOC 0-1 FALLS W/OUT INJ PAST YR: ICD-10-PCS | Mod: CPTII,S$GLB,, | Performed by: INTERNAL MEDICINE

## 2019-05-05 PROCEDURE — 63600175 PHARM REV CODE 636 W HCPCS: Performed by: SURGERY

## 2019-05-05 PROCEDURE — 3077F SYST BP >= 140 MM HG: CPT | Mod: CPTII,S$GLB,, | Performed by: INTERNAL MEDICINE

## 2019-05-05 PROCEDURE — 99214 PR OFFICE/OUTPT VISIT, EST, LEVL IV, 30-39 MIN: ICD-10-PCS | Mod: S$GLB,,, | Performed by: INTERNAL MEDICINE

## 2019-05-05 PROCEDURE — 25000003 PHARM REV CODE 250: Performed by: SURGERY

## 2019-05-05 RX ORDER — FAMOTIDINE 10 MG/ML
20 INJECTION INTRAVENOUS
Status: COMPLETED | OUTPATIENT
Start: 2019-05-05 | End: 2019-05-05

## 2019-05-05 RX ORDER — HYDRALAZINE HYDROCHLORIDE 20 MG/ML
10 INJECTION INTRAMUSCULAR; INTRAVENOUS
Status: DISCONTINUED | OUTPATIENT
Start: 2019-05-05 | End: 2019-05-11 | Stop reason: HOSPADM

## 2019-05-05 RX ORDER — RAMELTEON 8 MG/1
8 TABLET ORAL NIGHTLY PRN
Status: DISCONTINUED | OUTPATIENT
Start: 2019-05-05 | End: 2019-05-08

## 2019-05-05 RX ORDER — DEXTROSE MONOHYDRATE, SODIUM CHLORIDE, AND POTASSIUM CHLORIDE 50; 1.49; 9 G/1000ML; G/1000ML; G/1000ML
INJECTION, SOLUTION INTRAVENOUS CONTINUOUS
Status: DISCONTINUED | OUTPATIENT
Start: 2019-05-05 | End: 2019-05-07

## 2019-05-05 RX ORDER — HYDROMORPHONE HYDROCHLORIDE 1 MG/ML
0.5 INJECTION, SOLUTION INTRAMUSCULAR; INTRAVENOUS; SUBCUTANEOUS
Status: DISCONTINUED | OUTPATIENT
Start: 2019-05-05 | End: 2019-05-08

## 2019-05-05 RX ORDER — MORPHINE SULFATE 4 MG/ML
2 INJECTION, SOLUTION INTRAMUSCULAR; INTRAVENOUS
Status: COMPLETED | OUTPATIENT
Start: 2019-05-05 | End: 2019-05-05

## 2019-05-05 RX ORDER — SODIUM CHLORIDE 0.9 % (FLUSH) 0.9 %
3 SYRINGE (ML) INJECTION EVERY 8 HOURS
Status: DISCONTINUED | OUTPATIENT
Start: 2019-05-05 | End: 2019-05-11 | Stop reason: HOSPADM

## 2019-05-05 RX ORDER — SIMVASTATIN 20 MG/1
20 TABLET, FILM COATED ORAL NIGHTLY
Status: DISCONTINUED | OUTPATIENT
Start: 2019-05-05 | End: 2019-05-11 | Stop reason: HOSPADM

## 2019-05-05 RX ORDER — LOSARTAN POTASSIUM 25 MG/1
25 TABLET ORAL DAILY
Status: DISCONTINUED | OUTPATIENT
Start: 2019-05-06 | End: 2019-05-06

## 2019-05-05 RX ORDER — CALCIUM CARBONATE 200(500)MG
1 TABLET,CHEWABLE ORAL
COMMUNITY
End: 2019-05-24

## 2019-05-05 RX ORDER — DIPHENHYDRAMINE HCL 25 MG
25 CAPSULE ORAL NIGHTLY PRN
Status: DISCONTINUED | OUTPATIENT
Start: 2019-05-05 | End: 2019-05-11 | Stop reason: HOSPADM

## 2019-05-05 RX ORDER — BISMUTH SUBSALICYLATE 525 MG/30ML
15 LIQUID ORAL
COMMUNITY
End: 2019-05-24

## 2019-05-05 RX ORDER — ONDANSETRON 2 MG/ML
4 INJECTION INTRAMUSCULAR; INTRAVENOUS EVERY 8 HOURS PRN
Status: DISCONTINUED | OUTPATIENT
Start: 2019-05-05 | End: 2019-05-11 | Stop reason: HOSPADM

## 2019-05-05 RX ORDER — LIDOCAINE HYDROCHLORIDE 10 MG/ML
1 INJECTION, SOLUTION EPIDURAL; INFILTRATION; INTRACAUDAL; PERINEURAL ONCE
Status: DISCONTINUED | OUTPATIENT
Start: 2019-05-05 | End: 2019-05-06

## 2019-05-05 RX ORDER — ENOXAPARIN SODIUM 100 MG/ML
40 INJECTION SUBCUTANEOUS EVERY 24 HOURS
Status: DISCONTINUED | OUTPATIENT
Start: 2019-05-06 | End: 2019-05-09

## 2019-05-05 RX ADMIN — MORPHINE SULFATE 2 MG: 4 INJECTION INTRAVENOUS at 10:05

## 2019-05-05 RX ADMIN — Medication 3 ML: at 10:05

## 2019-05-05 RX ADMIN — SIMVASTATIN 20 MG: 20 TABLET, FILM COATED ORAL at 10:05

## 2019-05-05 RX ADMIN — FAMOTIDINE 20 MG: 10 INJECTION, SOLUTION INTRAVENOUS at 07:05

## 2019-05-05 RX ADMIN — HYDRALAZINE HYDROCHLORIDE 10 MG: 20 INJECTION INTRAMUSCULAR; INTRAVENOUS at 10:05

## 2019-05-05 NOTE — ED PROVIDER NOTES
"Encounter Date: 2019       History     Chief Complaint   Patient presents with    Abdominal Pain     seen at , sent to ER to rule out gallbladder problems or pancreatitis      73 year old female with medical history of HTN, HLD presenting to the ED with the chief complaint of abdominal pain. Patient reports having 3 days of epigastric abdominal pain. She describes the pain as an intermittent stabbing and "bloated" sensation. She reports having intermittent pain to her bilateral shoulders. She reports her last BM was 3 days ago and she is experiencing flatulence today. She reports associated increased belching and urinary frequency. No history of abdominal surgeries. She denies fever, chest pain, SOB, cough, nausea, vomiting.         Review of patient's allergies indicates:   Allergen Reactions    Meloxicam Rash     Flushing       Past Medical History:   Diagnosis Date    Hyperlipidemia     Hypertension      Past Surgical History:   Procedure Laterality Date     SECTION      EXCISIONAL HEMORRHOIDECTOMY      TONSILLECTOMY       Family History   Problem Relation Age of Onset    Hypertension Mother     Hyperlipidemia Mother     Sudden death Mother     Heart disease Father         MI at 58    Hypertension Father     Hyperlipidemia Father     Hypertension Sister     Hyperlipidemia Sister     Depression Sister     COPD Brother     Breast cancer Maternal Aunt     Breast cancer Paternal Aunt     Colon cancer Neg Hx     Ovarian cancer Neg Hx      Social History     Tobacco Use    Smoking status: Former Smoker     Types: Cigarettes     Last attempt to quit: 10/18/1981     Years since quittin.5    Smokeless tobacco: Never Used   Substance Use Topics    Alcohol use: Yes     Alcohol/week: 1.0 oz     Types: 2 Standard drinks or equivalent per week     Comment: social     Drug use: No     Review of Systems   Constitutional: Negative for chills, diaphoresis, fatigue and fever.   HENT: " Negative for congestion, sore throat and trouble swallowing.    Eyes: Negative for pain and redness.   Respiratory: Negative for cough and shortness of breath.    Cardiovascular: Negative for chest pain.   Gastrointestinal: Positive for abdominal distention and abdominal pain. Negative for blood in stool, constipation, diarrhea, nausea and vomiting.        +belching   Genitourinary: Positive for frequency. Negative for dysuria and hematuria.   Musculoskeletal: Negative for arthralgias, myalgias, neck pain and neck stiffness.   Skin: Negative for rash and wound.   Neurological: Negative for dizziness, weakness, light-headedness and headaches.     Physical Exam     Initial Vitals [05/05/19 1823]   BP Pulse Resp Temp SpO2   (!) 205/88 91 18 98.2 °F (36.8 °C) 98 %      MAP       --         Physical Exam    Constitutional: She appears well-developed and well-nourished. She is not diaphoretic. No distress.   HENT:   Head: Normocephalic and atraumatic.   Mouth/Throat: Oropharynx is clear and moist. No oropharyngeal exudate.   Eyes: EOM are normal. Pupils are equal, round, and reactive to light.   Neck: Normal range of motion. Neck supple.   Cardiovascular: Normal rate and regular rhythm.   Pulmonary/Chest: Breath sounds normal. No respiratory distress. She has no wheezes.   Abdominal: There is tenderness (Epi, RUQ). There is guarding.   Positive Roe's. No CVA tenderness   Musculoskeletal: Normal range of motion. She exhibits no edema or tenderness.   Neurological: She is alert and oriented to person, place, and time. She has normal strength. No cranial nerve deficit or sensory deficit.   Skin: Skin is warm and dry. No erythema.       ED Course   Procedures  Labs Reviewed   CBC W/ AUTO DIFFERENTIAL - Abnormal; Notable for the following components:       Result Value    Mean Corpuscular Hemoglobin 31.3 (*)     All other components within normal limits   URINALYSIS, REFLEX TO URINE CULTURE - Abnormal; Notable for the  following components:    Specific Gravity, UA >=1.030 (*)     Protein, UA 1+ (*)     Bilirubin (UA) 2+ (*)     Occult Blood UA 2+ (*)     All other components within normal limits    Narrative:     Preferred Collection Type->Urine, Clean Catch  yellow and grey   URINALYSIS MICROSCOPIC - Abnormal; Notable for the following components:    RBC, UA 7 (*)     All other components within normal limits    Narrative:     Preferred Collection Type->Urine, Clean Catch  yellow and grey   LIPASE - Abnormal; Notable for the following components:    Lipase 69 (*)     All other components within normal limits   COMPREHENSIVE METABOLIC PANEL - Abnormal; Notable for the following components:    Potassium 3.4 (*)     CO2 21 (*)     Total Bilirubin 5.4 (*)     Alkaline Phosphatase 301 (*)      (*)      (*)     All other components within normal limits   TROPONIN I        ECG Results          EKG 12-lead (In process)  Result time 05/05/19 22:07:56    In process by Interface, Lab In [facility] (05/05/19 22:07:56)                 Narrative:    Test Reason : R10.13,    Vent. Rate : 089 BPM     Atrial Rate : 089 BPM     P-R Int : 148 ms          QRS Dur : 074 ms      QT Int : 398 ms       P-R-T Axes : 073 050 065 degrees     QTc Int : 484 ms    Sinus rhythm with marked sinus arrythmia  Otherwise normal ECG  No previous ECGs available    Referred By: [email or web address]   SELF           Confirmed By:                             Imaging Results           US Abdomen Limited (Gallbladder) (Final result)  Result time 05/05/19 21:10:01    Final result by Ian العراقي MD (05/05/19 21:10:01)                 Impression:      Borderline enlarged common bile duct with mild central intrahepatic biliary duct dilatation.  Findings are worrisome for obstructive process including choledocholithiasis or mass lesion.  Recommend correlation with serum bilirubin and consider further characterization with MRCP/ERCP if indicated.    Sludge packed,  distended gallbladder with large stone in the gallbladder neck.  No gallbladder wall thickening, sonographic Roe sign, or other findings of acute cholecystitis.    This report was flagged in Epic as abnormal.    Electronically signed by resident: aMnuel Madrid  Date:    05/05/2019  Time:    19:51    Electronically signed by: Ian العراقي MD  Date:    05/05/2019  Time:    21:10             Narrative:    EXAMINATION:  US ABDOMEN LIMITED    CLINICAL HISTORY:  Epigastric pain.    TECHNIQUE:  Limited ultrasound of the right upper quadrant of the abdomen focused on the biliary system was performed.    COMPARISON:  None.    FINDINGS:  Gallbladder: Gallbladder is distended and packed with sludge and stones.  Largest gallstone is present in the neck measuring 2 cm.  No gallbladder wall thickening, pericholecystic fluid, or sonographic Roe sign.  No gallbladder wall hyperemia.    Biliary system: The common duct is mildly enlarged, measuring 7 mm.  Distal common bile duct is obscured by overlying bowel gas shadowing.  Mild central intrahepatic biliary ductal dilatation.    Pancreas: Pancreas is obscured by overlying bowel gas.    Miscellaneous: No upper abdominal ascites and no abnormalities of the visualized liver.                               X-Ray Chest PA And Lateral (Final result)  Result time 05/05/19 20:58:22    Final result by Ian العراقي MD (05/05/19 20:58:22)                 Impression:      No radiographic evidence of acute cardiopulmonary process.    Electronically signed by resident: Pal Ahn  Date:    05/05/2019  Time:    19:35    Electronically signed by: Ian العراقي MD  Date:    05/05/2019  Time:    20:58             Narrative:    EXAMINATION:  XR CHEST PA AND LATERAL    CLINICAL HISTORY:  Epigastric pain.    TECHNIQUE:  PA and lateral views of the chest were performed.    COMPARISON:  None.    FINDINGS:  Lung volumes are normal.  No consolidation, pneumothorax, or pleural fluid.   Mediastinal structures midline.  Cardiomediastinal silhouette is not enlarged.  No intraperitoneal free air.  Osseous structures demonstrate no acute abnormality.                                 Medical Decision Making:   History:   Old Medical Records: I decided to obtain old medical records.  Old Records Summarized: records from clinic visits.  Independently Interpreted Test(s):   I have ordered and independently interpreted X-rays - see prior notes.  I have ordered and independently interpreted EKG Reading(s) - see prior notes  Clinical Tests:   Lab Tests: Reviewed and Ordered  Radiological Study: Ordered and Reviewed  Medical Tests: Ordered and Reviewed  Other:   I have discussed this case with another health care provider.       <> Summary of the Discussion: General surgery       APC / Resident Notes:   73 year old female with medical history of HTN, HLD presenting to the ED c/o epigastric abdominal pain for 3 days. DDx includes but not limited to biliary colic, acute cholecystitis, cholangitis, PUD, pancreatitis, ACS, pneumnoia, GI obstruction. Will check labs, CXR, U/S. Will give analgesia as needed.     Work-up shows elevated Tbil 5.4, Alk phos 301, , , Lipase 69. WBC normal. U/S shows sludge packed, distended gallbladder with large stone in the gallbladder neck. Borderline enlarged common bile duct with mild central intrahepatic biliary duct dilatation.    Etiology concerning for biliary obstruction. General Surgery evaluated patient at bedside and will admit to their service for ongoing management. Patient expresses understanding and agreeable to the plan. I have discussed the care of this patient with my supervising physician.          Attending Attestation:     Physician Attestation Statement for NP/PA:   I have conducted a face to face encounter with this patient in addition to the NP/PA, due to Medical Complexity          I have reviewed and concur with the advanced practice clinician's  history, physical, assessment, and plan.  I have personally interviewed and examined the patient at bedside.  See below addendum for my evaluation and additional findings.    Briefly,  this is a 73 y.o. femaleWith a medical history of hypertension, hyperlipidemia presenting with abdominal pain and epigastric abdominal plan associated with bloating and stabbing. She's afebrile, hypertensive and without tachycardia. No hypoxemia. Exam consistent with positive abdominal pain, belching and abdominal distention.Positive [place] on my exam with no CVA tenderness. There is mild regarding. No peritoneal signs in the lower abdomen or pelvis.ECG with no signs of ischemia or STEMI on my read. Ultrasound of the abdomen obtained with and large CBD and central intrahepatic biliary duct dilation on my read. These findings are suggestive of choledocolithiasis. Labs with transaminases, elevated total bilirubin, elevated alk phos, and elevated AST/ALT and lipase.Discuss with general surgery and patient will be admitted to the general surgery team for ongoing management cares. Patient agreeable to admission plan.    Complexity:high - level 5      Shola Nixon DO    Dept of Emergency Medicine   [facility]  Spectralink: [id number]             Clinical Impression:       ICD-10-CM ICD-9-CM   1. Biliary obstruction K83.1 576.2   2. Epigastric abdominal pain R10.13 789.06   3. Transaminitis R74.0 790.4   4. Abnormal US (ultrasound) of abdomen R93.5 793.6         Disposition:   Disposition: Admitted  Condition: Serious                        Trae Flores PA-C  05/06/19 0006       Shola Nixon DO  05/07/19 2219

## 2019-05-05 NOTE — ED TRIAGE NOTES
Pt presents to the ED with complaints of abd pain starting Friday, pt reports bloating, diaphoresis, chest tightness, and back pain. Pt took tums, helena seltzer.Pt denies nausea or vomiting. Pt reports chest tightness has relieved.    Patient identifiers verified and correct for Torie Richard.  LOC: The patient is awake, alert and aware of environment with an appropriate affect, the patient is oriented x 3 and speaking appropriately. Pt reports relieved headache from Saturday.  APPEARANCE: Patient appears comfortable and in no acute distress, patient is clean and well groomed.  SKIN: The skin is warm and dry, color consistent with ethnicity, patient has normal skin turgor and moist mucus membranes, skin intact, no breakdown or bruising noted.   MUSCULOSKELETAL: Patient moving all extremities spontaneously, no swelling noted.   RESPIRATORY: Airway is open and patent, respirations are spontaneous, patient has a normal effort and rate, no accessory muscle use noted, pt placed on continuous pulse ox with O2 sats noted at 98% on room air.  CARDIAC: Pt placed on cardiac monitor. Patient has a normal rate and regular rhythm, no edema noted, capillary refill < 3 seconds.   GASTRO: Soft and non tender to palpation, no distention noted, normoactive bowel sounds present in all four quadrants. Pt reports upper abd pain and right sided tenderness. Pt reports no BM since Friday morning, but she reports passing gas.  : Pt denies any pain with urination. Pt reports frequency with urination.  NEURO: Pt opens eyes spontaneously, behavior appropriate to situation, follows commands, facial expression symmetrical, bilateral hand grasp equal and even, purposeful motor response noted, normal sensation in all extremities when touched with a finger.

## 2019-05-05 NOTE — PROGRESS NOTES
"Subjective:       Patient ID: Torie Richard is a 73 y.o. female.    Vitals:  height is 5' 4" (1.626 m) and weight is 62.1 kg (137 lb). Her oral temperature is 98.5 °F (36.9 °C). Her blood pressure is 189/88 (abnormal) and her pulse is 87. Her respiration is 16 and oxygen saturation is 98%.     Chief Complaint: Bloated    Friday at 3 P.M. Pt experienced sudden onset of abdominal bloating associated with mild abdominal discomfort and moderate middle back pain.  Pt could visibly see significant abdominal swelling.  Pt notes increase in urine output without increase in fluid intake.    Abdominal Pain   This is a new problem. The current episode started in the past 7 days. The onset quality is sudden. The pain is at a severity of 8/10. The pain is severe. The quality of the pain is a sensation of fullness. Associated symptoms include constipation and frequency. Pertinent negatives include no anorexia, arthralgias, belching, diarrhea, dysuria, fever, flatus, headaches, hematochezia, hematuria, melena, myalgias, nausea, vomiting or weight loss. The pain is aggravated by certain positions. The pain is relieved by nothing. She has tried antacids for the symptoms. The treatment provided no relief. There is no history of abdominal surgery, colon cancer, Crohn's disease, gallstones, GERD, irritable bowel syndrome, pancreatitis, PUD or ulcerative colitis. Patient's medical history does not include kidney stones and UTI.       Constitution: Positive for sweating. Negative for appetite change, chills, fatigue and fever.   HENT: Negative for congestion and sore throat.    Neck: Negative for painful lymph nodes.   Cardiovascular: Negative for chest pain and leg swelling.   Eyes: Negative for double vision and blurred vision.   Respiratory: Negative for cough and shortness of breath.    Gastrointestinal: Positive for abdominal pain and constipation. Negative for history of abdominal surgery, nausea, vomiting, diarrhea and bright " red blood in stool.   Genitourinary: Positive for frequency. Negative for dysuria, urgency, hematuria and history of kidney stones.   Musculoskeletal: Negative for joint pain, joint swelling, muscle cramps and muscle ache.   Skin: Negative for color change, pale, rash and bruising.   Allergic/Immunologic: Negative for seasonal allergies.   Neurological: Negative for dizziness, history of vertigo, light-headedness, passing out and headaches.   Hematologic/Lymphatic: Negative for swollen lymph nodes.   Psychiatric/Behavioral: Negative for nervous/anxious, sleep disturbance and depression. The patient is not nervous/anxious.        Objective:      Physical Exam   Constitutional: She appears well-developed and well-nourished.   HENT:   Head: Normocephalic and atraumatic.   Eyes: Pupils are equal, round, and reactive to light. Conjunctivae and EOM are normal.   Neck: Normal range of motion. Neck supple.   Cardiovascular: Normal rate and regular rhythm.   Pulmonary/Chest: Effort normal and breath sounds normal.   Abdominal: She exhibits no distension and no mass. There is tenderness (RUQ). There is rebound (RUQ) and guarding (RUQ).   Nursing note and vitals reviewed.      Assessment:       1. Right upper quadrant abdominal pain        Plan:         Right upper quadrant abdominal pain  -     Refer to Emergency Dept.

## 2019-05-06 ENCOUNTER — ANESTHESIA (OUTPATIENT)
Dept: ENDOSCOPY | Facility: HOSPITAL | Age: 74
DRG: 418 | End: 2019-05-06
Payer: MEDICARE

## 2019-05-06 ENCOUNTER — ANESTHESIA EVENT (OUTPATIENT)
Dept: ENDOSCOPY | Facility: HOSPITAL | Age: 74
DRG: 418 | End: 2019-05-06
Payer: MEDICARE

## 2019-05-06 PROBLEM — K83.1 BILIARY OBSTRUCTION: Status: ACTIVE | Noted: 2019-05-06

## 2019-05-06 LAB
ALBUMIN SERPL BCP-MCNC: 3.8 G/DL (ref 3.5–5.2)
ALP SERPL-CCNC: 321 U/L (ref 55–135)
ALT SERPL W/O P-5'-P-CCNC: 690 U/L (ref 10–44)
ANION GAP SERPL CALC-SCNC: 8 MMOL/L (ref 8–16)
AST SERPL-CCNC: 214 U/L (ref 10–40)
BASOPHILS # BLD AUTO: 0.03 K/UL (ref 0–0.2)
BASOPHILS NFR BLD: 0.4 % (ref 0–1.9)
BILIRUB SERPL-MCNC: 5.3 MG/DL (ref 0.1–1)
BUN SERPL-MCNC: 14 MG/DL (ref 8–23)
CALCIUM SERPL-MCNC: 9.7 MG/DL (ref 8.7–10.5)
CHLORIDE SERPL-SCNC: 107 MMOL/L (ref 95–110)
CO2 SERPL-SCNC: 25 MMOL/L (ref 23–29)
CREAT SERPL-MCNC: 0.8 MG/DL (ref 0.5–1.4)
DIFFERENTIAL METHOD: ABNORMAL
EOSINOPHIL # BLD AUTO: 0 K/UL (ref 0–0.5)
EOSINOPHIL NFR BLD: 0.1 % (ref 0–8)
ERYTHROCYTE [DISTWIDTH] IN BLOOD BY AUTOMATED COUNT: 12.8 % (ref 11.5–14.5)
EST. GFR  (AFRICAN AMERICAN): >60 ML/MIN/1.73 M^2
EST. GFR  (NON AFRICAN AMERICAN): >60 ML/MIN/1.73 M^2
GLUCOSE SERPL-MCNC: 130 MG/DL (ref 70–110)
HCT VFR BLD AUTO: 42.7 % (ref 37–48.5)
HGB BLD-MCNC: 13.7 G/DL (ref 12–16)
IMM GRANULOCYTES # BLD AUTO: 0.03 K/UL (ref 0–0.04)
IMM GRANULOCYTES NFR BLD AUTO: 0.4 % (ref 0–0.5)
LYMPHOCYTES # BLD AUTO: 1 K/UL (ref 1–4.8)
LYMPHOCYTES NFR BLD: 12.8 % (ref 18–48)
MAGNESIUM SERPL-MCNC: 2.1 MG/DL (ref 1.6–2.6)
MCH RBC QN AUTO: 31 PG (ref 27–31)
MCHC RBC AUTO-ENTMCNC: 32.1 G/DL (ref 32–36)
MCV RBC AUTO: 97 FL (ref 82–98)
MONOCYTES # BLD AUTO: 0.5 K/UL (ref 0.3–1)
MONOCYTES NFR BLD: 6.7 % (ref 4–15)
NEUTROPHILS # BLD AUTO: 6.3 K/UL (ref 1.8–7.7)
NEUTROPHILS NFR BLD: 79.6 % (ref 38–73)
NRBC BLD-RTO: 0 /100 WBC
PHOSPHATE SERPL-MCNC: 2.6 MG/DL (ref 2.7–4.5)
PLATELET # BLD AUTO: 262 K/UL (ref 150–350)
PMV BLD AUTO: 10.7 FL (ref 9.2–12.9)
POTASSIUM SERPL-SCNC: 4.5 MMOL/L (ref 3.5–5.1)
PROT SERPL-MCNC: 7 G/DL (ref 6–8.4)
RBC # BLD AUTO: 4.42 M/UL (ref 4–5.4)
SODIUM SERPL-SCNC: 140 MMOL/L (ref 136–145)
WBC # BLD AUTO: 7.91 K/UL (ref 3.9–12.7)

## 2019-05-06 PROCEDURE — 27202125 HC BALLOON, EXTRACTION (ANY): Performed by: INTERNAL MEDICINE

## 2019-05-06 PROCEDURE — 27201674 HC SPHINCTERTOME: Performed by: INTERNAL MEDICINE

## 2019-05-06 PROCEDURE — 37000008 HC ANESTHESIA 1ST 15 MINUTES: Performed by: INTERNAL MEDICINE

## 2019-05-06 PROCEDURE — 99223 1ST HOSP IP/OBS HIGH 75: CPT | Mod: 25,GC,, | Performed by: INTERNAL MEDICINE

## 2019-05-06 PROCEDURE — C1769 GUIDE WIRE: HCPCS | Performed by: INTERNAL MEDICINE

## 2019-05-06 PROCEDURE — 83735 ASSAY OF MAGNESIUM: CPT

## 2019-05-06 PROCEDURE — D9220A PRA ANESTHESIA: ICD-10-PCS | Mod: CRNA,,, | Performed by: NURSE ANESTHETIST, CERTIFIED REGISTERED

## 2019-05-06 PROCEDURE — 25000003 PHARM REV CODE 250: Performed by: SURGERY

## 2019-05-06 PROCEDURE — 36415 COLL VENOUS BLD VENIPUNCTURE: CPT

## 2019-05-06 PROCEDURE — 43274 ERCP DUCT STENT PLACEMENT: CPT | Mod: ,,, | Performed by: INTERNAL MEDICINE

## 2019-05-06 PROCEDURE — C2617 STENT, NON-COR, TEM W/O DEL: HCPCS | Performed by: INTERNAL MEDICINE

## 2019-05-06 PROCEDURE — D9220A PRA ANESTHESIA: Mod: ANES,,, | Performed by: ANESTHESIOLOGY

## 2019-05-06 PROCEDURE — 74328 PR  X-RAY FOR BILE DUCT ENDOSCOPY: ICD-10-PCS | Mod: 26,,, | Performed by: INTERNAL MEDICINE

## 2019-05-06 PROCEDURE — 99223 1ST HOSP IP/OBS HIGH 75: CPT | Mod: AI,GC,, | Performed by: SURGERY

## 2019-05-06 PROCEDURE — 63600175 PHARM REV CODE 636 W HCPCS: Performed by: SURGERY

## 2019-05-06 PROCEDURE — 25000003 PHARM REV CODE 250: Performed by: INTERNAL MEDICINE

## 2019-05-06 PROCEDURE — 80053 COMPREHEN METABOLIC PANEL: CPT

## 2019-05-06 PROCEDURE — A4216 STERILE WATER/SALINE, 10 ML: HCPCS | Performed by: INTERNAL MEDICINE

## 2019-05-06 PROCEDURE — 27000221 HC OXYGEN, UP TO 24 HOURS

## 2019-05-06 PROCEDURE — 84100 ASSAY OF PHOSPHORUS: CPT

## 2019-05-06 PROCEDURE — 20600001 HC STEP DOWN PRIVATE ROOM

## 2019-05-06 PROCEDURE — 74328 X-RAY BILE DUCT ENDOSCOPY: CPT | Performed by: INTERNAL MEDICINE

## 2019-05-06 PROCEDURE — 63600175 PHARM REV CODE 636 W HCPCS: Performed by: NURSE ANESTHETIST, CERTIFIED REGISTERED

## 2019-05-06 PROCEDURE — 94761 N-INVAS EAR/PLS OXIMETRY MLT: CPT

## 2019-05-06 PROCEDURE — 63600175 PHARM REV CODE 636 W HCPCS: Performed by: STUDENT IN AN ORGANIZED HEALTH CARE EDUCATION/TRAINING PROGRAM

## 2019-05-06 PROCEDURE — 25500020 PHARM REV CODE 255: Performed by: INTERNAL MEDICINE

## 2019-05-06 PROCEDURE — 43274 ERCP DUCT STENT PLACEMENT: CPT | Performed by: INTERNAL MEDICINE

## 2019-05-06 PROCEDURE — 25000003 PHARM REV CODE 250: Performed by: STUDENT IN AN ORGANIZED HEALTH CARE EDUCATION/TRAINING PROGRAM

## 2019-05-06 PROCEDURE — D9220A PRA ANESTHESIA: Mod: CRNA,,, | Performed by: NURSE ANESTHETIST, CERTIFIED REGISTERED

## 2019-05-06 PROCEDURE — 43274 PR ERCP W/STENT PLCMNT BILIARY/PANCREATIC DUCT: ICD-10-PCS | Mod: ,,, | Performed by: INTERNAL MEDICINE

## 2019-05-06 PROCEDURE — 25000003 PHARM REV CODE 250: Performed by: NURSE ANESTHETIST, CERTIFIED REGISTERED

## 2019-05-06 PROCEDURE — 37000009 HC ANESTHESIA EA ADD 15 MINS: Performed by: INTERNAL MEDICINE

## 2019-05-06 PROCEDURE — 85025 COMPLETE CBC W/AUTO DIFF WBC: CPT

## 2019-05-06 PROCEDURE — D9220A PRA ANESTHESIA: ICD-10-PCS | Mod: ANES,,, | Performed by: ANESTHESIOLOGY

## 2019-05-06 PROCEDURE — 99223 PR INITIAL HOSPITAL CARE,LEVL III: ICD-10-PCS | Mod: AI,GC,, | Performed by: SURGERY

## 2019-05-06 PROCEDURE — 99223 PR INITIAL HOSPITAL CARE,LEVL III: ICD-10-PCS | Mod: 25,GC,, | Performed by: INTERNAL MEDICINE

## 2019-05-06 PROCEDURE — 63600175 PHARM REV CODE 636 W HCPCS: Performed by: INTERNAL MEDICINE

## 2019-05-06 PROCEDURE — 27202074 HC NEEDLE KNIFE, BILIARY: Performed by: INTERNAL MEDICINE

## 2019-05-06 PROCEDURE — 74328 X-RAY BILE DUCT ENDOSCOPY: CPT | Mod: 26,,, | Performed by: INTERNAL MEDICINE

## 2019-05-06 RX ORDER — SODIUM CHLORIDE 9 MG/ML
INJECTION, SOLUTION INTRAVENOUS CONTINUOUS
Status: DISCONTINUED | OUTPATIENT
Start: 2019-05-06 | End: 2019-05-07

## 2019-05-06 RX ORDER — MIDAZOLAM HYDROCHLORIDE 1 MG/ML
INJECTION, SOLUTION INTRAMUSCULAR; INTRAVENOUS
Status: DISCONTINUED | OUTPATIENT
Start: 2019-05-06 | End: 2019-05-06

## 2019-05-06 RX ORDER — SODIUM CHLORIDE 9 MG/ML
INJECTION, SOLUTION INTRAVENOUS CONTINUOUS PRN
Status: DISCONTINUED | OUTPATIENT
Start: 2019-05-06 | End: 2019-05-06

## 2019-05-06 RX ORDER — PROPOFOL 10 MG/ML
VIAL (ML) INTRAVENOUS
Status: DISCONTINUED | OUTPATIENT
Start: 2019-05-06 | End: 2019-05-06

## 2019-05-06 RX ORDER — LIDOCAINE HCL/PF 100 MG/5ML
SYRINGE (ML) INTRAVENOUS
Status: DISCONTINUED | OUTPATIENT
Start: 2019-05-06 | End: 2019-05-06

## 2019-05-06 RX ORDER — KETAMINE HCL IN 0.9 % NACL 50 MG/5 ML
SYRINGE (ML) INTRAVENOUS
Status: DISCONTINUED | OUTPATIENT
Start: 2019-05-06 | End: 2019-05-06

## 2019-05-06 RX ORDER — LIDOCAINE HYDROCHLORIDE 20 MG/ML
SOLUTION OROPHARYNGEAL
Status: DISCONTINUED | OUTPATIENT
Start: 2019-05-06 | End: 2019-05-06

## 2019-05-06 RX ORDER — FENTANYL CITRATE 50 UG/ML
INJECTION, SOLUTION INTRAMUSCULAR; INTRAVENOUS
Status: DISCONTINUED | OUTPATIENT
Start: 2019-05-06 | End: 2019-05-06

## 2019-05-06 RX ORDER — HYDROMORPHONE HYDROCHLORIDE 1 MG/ML
0.2 INJECTION, SOLUTION INTRAMUSCULAR; INTRAVENOUS; SUBCUTANEOUS EVERY 5 MIN PRN
Status: DISCONTINUED | OUTPATIENT
Start: 2019-05-06 | End: 2019-05-06 | Stop reason: HOSPADM

## 2019-05-06 RX ORDER — PROPOFOL 10 MG/ML
VIAL (ML) INTRAVENOUS CONTINUOUS PRN
Status: DISCONTINUED | OUTPATIENT
Start: 2019-05-06 | End: 2019-05-06

## 2019-05-06 RX ORDER — SODIUM CHLORIDE 0.9 % (FLUSH) 0.9 %
3 SYRINGE (ML) INJECTION
Status: DISCONTINUED | OUTPATIENT
Start: 2019-05-06 | End: 2019-05-06 | Stop reason: HOSPADM

## 2019-05-06 RX ADMIN — SODIUM GLYCOLATE 15 MMOL: 216 INJECTION, SOLUTION INTRAVENOUS at 08:05

## 2019-05-06 RX ADMIN — FENTANYL CITRATE 25 MCG: 50 INJECTION, SOLUTION INTRAMUSCULAR; INTRAVENOUS at 01:05

## 2019-05-06 RX ADMIN — SODIUM CHLORIDE: 0.9 INJECTION, SOLUTION INTRAVENOUS at 01:05

## 2019-05-06 RX ADMIN — DIPHENHYDRAMINE HYDROCHLORIDE 25 MG: 25 CAPSULE ORAL at 08:05

## 2019-05-06 RX ADMIN — Medication 10 MG: at 01:05

## 2019-05-06 RX ADMIN — HYDROMORPHONE HYDROCHLORIDE 0.5 MG: 1 INJECTION, SOLUTION INTRAMUSCULAR; INTRAVENOUS; SUBCUTANEOUS at 04:05

## 2019-05-06 RX ADMIN — PROPOFOL 30 MG: 10 INJECTION, EMULSION INTRAVENOUS at 01:05

## 2019-05-06 RX ADMIN — DEXTROSE MONOHYDRATE, SODIUM CHLORIDE, AND POTASSIUM CHLORIDE: 50; 9; 1.49 INJECTION, SOLUTION INTRAVENOUS at 09:05

## 2019-05-06 RX ADMIN — LIDOCAINE HYDROCHLORIDE 10 MG: 20 INJECTION, SOLUTION INTRAVENOUS at 01:05

## 2019-05-06 RX ADMIN — DEXTROSE MONOHYDRATE, SODIUM CHLORIDE, AND POTASSIUM CHLORIDE: 50; 9; 1.49 INJECTION, SOLUTION INTRAVENOUS at 12:05

## 2019-05-06 RX ADMIN — SIMVASTATIN 20 MG: 20 TABLET, FILM COATED ORAL at 08:05

## 2019-05-06 RX ADMIN — PIPERACILLIN AND TAZOBACTAM 4.5 G: 4; .5 INJECTION, POWDER, LYOPHILIZED, FOR SOLUTION INTRAVENOUS; PARENTERAL at 05:05

## 2019-05-06 RX ADMIN — ONDANSETRON 4 MG: 2 INJECTION INTRAMUSCULAR; INTRAVENOUS at 08:05

## 2019-05-06 RX ADMIN — ENOXAPARIN SODIUM 40 MG: 100 INJECTION SUBCUTANEOUS at 05:05

## 2019-05-06 RX ADMIN — PROPOFOL 100 MCG/KG/MIN: 10 INJECTION, EMULSION INTRAVENOUS at 01:05

## 2019-05-06 RX ADMIN — Medication 3 ML: at 02:05

## 2019-05-06 RX ADMIN — MIDAZOLAM HYDROCHLORIDE 1 MG: 1 INJECTION, SOLUTION INTRAMUSCULAR; INTRAVENOUS at 01:05

## 2019-05-06 RX ADMIN — IOHEXOL 12 ML: 300 INJECTION, SOLUTION INTRAVENOUS at 02:05

## 2019-05-06 RX ADMIN — Medication 3 ML: at 10:05

## 2019-05-06 RX ADMIN — FENTANYL CITRATE 25 MCG: 50 INJECTION, SOLUTION INTRAMUSCULAR; INTRAVENOUS at 02:05

## 2019-05-06 RX ADMIN — LIDOCAINE HYDROCHLORIDE 15 ML: 20 SOLUTION ORAL; TOPICAL at 01:05

## 2019-05-06 NOTE — HPI
Torie Richard is a 73 y.o. female with HTN and HLD presented to ED for epigastric pain x 3 days PTA. Mostly RUQ. Radiates to back. No associated sx such as nausea or vomiting. Does not recollect and aggravating or initiating factors. Had similar episodes in the past but short lasted. Since admission, US showed sludge packed, distended gallbladder with large stone in the gallbladder neck.  No gallbladder wall thickening, sonographic Roe sign, or other findings of acute cholecystitis. Labs showed obstructive jaundice.      Ref. Range 5/5/2019 20:09 5/6/2019 04:31   BILIRUBIN TOTAL Latest Ref Range: 0.1 - 1.0 mg/dL 5.4 (H) 5.3 (H)   AST Latest Ref Range: 10 - 40 U/L 292 (H) 214 (H)   ALT Latest Ref Range: 10 - 44 U/L 775 (H) 690 (H)

## 2019-05-06 NOTE — NURSING
Patient arrived on unit, ambulated with steady gait to the bed in her room with CGA. She presents as pleasant and oriented x4, and she currently denies any pain.  RN provided bedside teaching related to fall precautions, as well as orientation to the unit, call bell, unit routines, and her individual POC.  Safety maintained with bed low/locked, call bell in reach, floor free of clutter.  Will continue to monitor and give report to oncoming shift.

## 2019-05-06 NOTE — CONSULTS
[facility]  Gastroenterology  Consult Note    Patient Name: Torie Richard  MRN: [id number]  Admission Date: 2019  Hospital Length of Stay: 1 days  Code Status: Full Code   Attending Provider: Tayo Guadarrama MD   Consulting Provider: Siddharth Ignacio MD  Primary Care Physician: Bartolo Burciaga MD  Principal Problem:<principal problem not specified>    Inpatient consult to [facility]  Consult performed by: Siddharth Ignacio MD  Consult ordered by: Ethan Piedra MD  Reason for consult: biliary obstruction        Subjective:     HPI:  Torie Richard is a 73 y.o. female with HTN and HLD presented to ED for epigastric pain x 3 days PTA. Mostly RUQ. Radiates to back. No associated sx such as nausea or vomiting. Does not recollect and aggravating or initiating factors. Had similar episodes in the past but short lasted. Since admission, US showed sludge packed, distended gallbladder with large stone in the gallbladder neck.  No gallbladder wall thickening, sonographic Roe sign, or other findings of acute cholecystitis. Labs showed obstructive jaundice.      Ref. Range 2019 20:09 2019 04:31   BILIRUBIN TOTAL Latest Ref Range: 0.1 - 1.0 mg/dL 5.4 (H) 5.3 (H)   AST Latest Ref Range: 10 - 40 U/L 292 (H) 214 (H)   ALT Latest Ref Range: 10 - 44 U/L 775 (H) 690 (H)       Past Medical History:   Diagnosis Date    Hyperlipidemia     Hypertension        Past Surgical History:   Procedure Laterality Date     SECTION      EXCISIONAL HEMORRHOIDECTOMY      TONSILLECTOMY         Review of patient's allergies indicates:   Allergen Reactions    Meloxicam Rash     Flushing       Family History     Problem Relation (Age of Onset)    Breast cancer Maternal Aunt, Paternal Aunt    COPD Brother    Depression Sister    Heart disease Father    Hyperlipidemia Mother, Father, Sister    Hypertension Mother, Father, Sister    Sudden death Mother        Tobacco  Use    Smoking status: Former Smoker     Types: Cigarettes     Last attempt to quit: 10/18/1981     Years since quittin.5    Smokeless tobacco: Never Used   Substance and Sexual Activity    Alcohol use: Yes     Alcohol/week: 1.0 oz     Types: 2 Standard drinks or equivalent per week     Comment: social     Drug use: No    Sexual activity: Yes     Partners: Male     Review of Systems   Constitutional: Positive for activity change and appetite change. Negative for fever.   HENT: Negative for ear discharge and facial swelling.    Eyes: Negative for photophobia and redness.   Respiratory: Negative for wheezing and stridor.    Cardiovascular: Negative for chest pain and palpitations.   Gastrointestinal: Positive for abdominal pain. Negative for anal bleeding, blood in stool, constipation, diarrhea, nausea, rectal pain and vomiting.   Endocrine: Negative for cold intolerance and heat intolerance.   Genitourinary: Negative for dysuria and frequency.   Skin: Negative for color change and rash.   Allergic/Immunologic: Negative for food allergies.   Neurological: Negative for seizures and numbness.   Hematological: Does not bruise/bleed easily.   Psychiatric/Behavioral: Negative for agitation.     Objective:     Vital Signs (Most Recent):  Temp: 98.4 °F (36.9 °C) (19)  Pulse: 75 (19)  Resp: 16 (19)  BP: (!) 150/65 (19)  SpO2: 97 % (19) Vital Signs (24h Range):  Temp:  [98.2 °F (36.8 °C)-98.8 °F (37.1 °C)] 98.4 °F (36.9 °C)  Pulse:  [75-91] 75  Resp:  [15-18] 16  SpO2:  [96 %-99 %] 97 %  BP: (131-212)/(59-88) 150/65     Weight: 61 kg (134 lb 7.7 oz) (19 0122)  Body mass index is 23.08 kg/m².    No intake or output data in the 24 hours ending 19 0903    Lines/Drains/Airways     Peripheral Intravenous Line                 Peripheral IV - Single Lumen 19 1900 20 G Right Wrist less than 1 day                Physical Exam   Constitutional: She is  "oriented to person, place, and time. She appears well-developed and well-nourished.   Eyes: No scleral icterus.   Neck: Neck supple.   Cardiovascular: Normal rate and regular rhythm. Exam reveals no gallop.   Pulmonary/Chest: Effort normal. No respiratory distress.   Abdominal: Soft. Bowel sounds are normal. She exhibits no distension and no mass. There is no tenderness. There is no rebound and no guarding. No hernia.   Musculoskeletal: She exhibits no edema.   Neurological: She is alert and oriented to person, place, and time.   Skin: Skin is warm.   Psychiatric: She has a normal mood and affect. Her behavior is normal.       BP (!) 150/65 (BP Location: Left arm, Patient Position: Lying)   Pulse 75   Temp 98.4 °F (36.9 °C) (Oral)   Resp 16   Ht 5' 4" (1.626 m)   Wt 61 kg (134 lb 7.7 oz)   SpO2 97%   Breastfeeding? No   BMI 23.08 kg/m²    Lab Results   Component Value Date    WBC 7.91 05/06/2019    HGB 13.7 05/06/2019    HCT 42.7 05/06/2019    MCV 97 05/06/2019     05/06/2019     No results found for: INR  Lab Results   Component Value Date     05/06/2019    K 4.5 05/06/2019    CREATININE 0.8 05/06/2019     Lab Results   Component Value Date    ALBUMIN 3.8 05/06/2019     (H) 05/06/2019     (H) 05/06/2019    ALKPHOS 321 (H) 05/06/2019    BILITOT 5.3 (H) 05/06/2019     No results found for: AFP  Lab Results   Component Value Date    LIPASE 69 (H) 05/05/2019     No results found for: TACROLIMUS    Imaging:  Reviewed and as noted in HPI.         Assessment/Plan:     Biliary obstruction  Torie Richard is a 73 y.o. female with epigastric pain and biliary obstruction with imaging suggestive of CBD stone vs Mirizzi's syndrome.     We will proceed with ERCP prior to planned surgery for cholecystectomy.   Keep NPO        Thank you for your consult. I will follow-up with patient. Please contact us if you have any additional questions.    Siddharth Ignacio MD  Gastroenterology  [facility]

## 2019-05-06 NOTE — NURSING TRANSFER
Nursing Transfer Note      5/6/2019     Transfer To: 1034    Transfer via stretcher    Transfer with none    Transported by PCT    Medicines sent: none    Chart send with patient: Yes    Notified: patient refused family notification    Patient reassessed at: 5/6/2019 3:15 PM

## 2019-05-06 NOTE — ANESTHESIA POSTPROCEDURE EVALUATION
Anesthesia Post Evaluation    Patient: Torie Richard    Procedure(s) Performed: Procedure(s) (LRB):  ERCP (ENDOSCOPIC RETROGRADE CHOLANGIOPANCREATOGRAPHY) (N/A)    Final Anesthesia Type: general  Patient location during evaluation: PACU  Patient participation: Yes- Able to Participate  Level of consciousness: awake and alert and oriented  Post-procedure vital signs: reviewed and stable  Pain management: adequate  Airway patency: patent  PONV status at discharge: No PONV  Anesthetic complications: no      Cardiovascular status: hemodynamically stable  Respiratory status: unassisted and spontaneous ventilation  Hydration status: euvolemic  Follow-up not needed.          Vitals Value Taken Time   /68 5/6/2019  3:16 PM   Temp 36.7 °C (98.1 °F) 5/6/2019  3:15 PM   Pulse 75 5/6/2019  3:30 PM   Resp 13 5/6/2019  3:30 PM   SpO2 94 % 5/6/2019  3:30 PM   Vitals shown include unvalidated device data.      Event Time     Out of Recovery 05/06/2019 15:33:00          Pain/Rody Score: Pain Rating Prior to Med Admin: 6 (5/6/2019  4:52 AM)  Rody Score: 10 (5/6/2019  3:15 PM)

## 2019-05-06 NOTE — PLAN OF CARE
Unable to complete discharge planning due to patient is not in her room. Having ERCP today.     [facility] at [facility].        05/06/19 1142   Discharge Assessment   Assessment Type Discharge Planning Assessment

## 2019-05-06 NOTE — ASSESSMENT & PLAN NOTE
Torie Richard is a 73 y.o. female with epigastric pain and biliary obstruction with imaging suggestive of CBD stone vs Mirizzi's syndrome.     We will proceed with ERCP prior to planned surgery for cholecystectomy.   Keep NPO

## 2019-05-06 NOTE — SUBJECTIVE & OBJECTIVE
Past Medical History:   Diagnosis Date    Hyperlipidemia     Hypertension        Past Surgical History:   Procedure Laterality Date     SECTION      EXCISIONAL HEMORRHOIDECTOMY      TONSILLECTOMY         Review of patient's allergies indicates:   Allergen Reactions    Meloxicam Rash     Flushing       Family History     Problem Relation (Age of Onset)    Breast cancer Maternal Aunt, Paternal Aunt    COPD Brother    Depression Sister    Heart disease Father    Hyperlipidemia Mother, Father, Sister    Hypertension Mother, Father, Sister    Sudden death Mother        Tobacco Use    Smoking status: Former Smoker     Types: Cigarettes     Last attempt to quit: 10/18/1981     Years since quittin.5    Smokeless tobacco: Never Used   Substance and Sexual Activity    Alcohol use: Yes     Alcohol/week: 1.0 oz     Types: 2 Standard drinks or equivalent per week     Comment: social     Drug use: No    Sexual activity: Yes     Partners: Male     Review of Systems   Constitutional: Positive for activity change and appetite change. Negative for fever.   HENT: Negative for ear discharge and facial swelling.    Eyes: Negative for photophobia and redness.   Respiratory: Negative for wheezing and stridor.    Cardiovascular: Negative for chest pain and palpitations.   Gastrointestinal: Positive for abdominal pain. Negative for anal bleeding, blood in stool, constipation, diarrhea, nausea, rectal pain and vomiting.   Endocrine: Negative for cold intolerance and heat intolerance.   Genitourinary: Negative for dysuria and frequency.   Skin: Negative for color change and rash.   Allergic/Immunologic: Negative for food allergies.   Neurological: Negative for seizures and numbness.   Hematological: Does not bruise/bleed easily.   Psychiatric/Behavioral: Negative for agitation.     Objective:     Vital Signs (Most Recent):  Temp: 98.4 °F (36.9 °C) (19 0846)  Pulse: 75 (19 0846)  Resp: 16 (19  "0846)  BP: (!) 150/65 (05/06/19 0846)  SpO2: 97 % (05/06/19 0846) Vital Signs (24h Range):  Temp:  [98.2 °F (36.8 °C)-98.8 °F (37.1 °C)] 98.4 °F (36.9 °C)  Pulse:  [75-91] 75  Resp:  [15-18] 16  SpO2:  [96 %-99 %] 97 %  BP: (131-212)/(59-88) 150/65     Weight: 61 kg (134 lb 7.7 oz) (05/06/19 0122)  Body mass index is 23.08 kg/m².    No intake or output data in the 24 hours ending 05/06/19 0903    Lines/Drains/Airways     Peripheral Intravenous Line                 Peripheral IV - Single Lumen 05/05/19 1900 20 G Right Wrist less than 1 day                Physical Exam   Constitutional: She is oriented to person, place, and time. She appears well-developed and well-nourished.   Eyes: No scleral icterus.   Neck: Neck supple.   Cardiovascular: Normal rate and regular rhythm. Exam reveals no gallop.   Pulmonary/Chest: Effort normal. No respiratory distress.   Abdominal: Soft. Bowel sounds are normal. She exhibits no distension and no mass. There is no tenderness. There is no rebound and no guarding. No hernia.   Musculoskeletal: She exhibits no edema.   Neurological: She is alert and oriented to person, place, and time.   Skin: Skin is warm.   Psychiatric: She has a normal mood and affect. Her behavior is normal.       BP (!) 150/65 (BP Location: Left arm, Patient Position: Lying)   Pulse 75   Temp 98.4 °F (36.9 °C) (Oral)   Resp 16   Ht 5' 4" (1.626 m)   Wt 61 kg (134 lb 7.7 oz)   SpO2 97%   Breastfeeding? No   BMI 23.08 kg/m²   Lab Results   Component Value Date    WBC 7.91 05/06/2019    HGB 13.7 05/06/2019    HCT 42.7 05/06/2019    MCV 97 05/06/2019     05/06/2019     No results found for: INR  Lab Results   Component Value Date     05/06/2019    K 4.5 05/06/2019    CREATININE 0.8 05/06/2019     Lab Results   Component Value Date    ALBUMIN 3.8 05/06/2019     (H) 05/06/2019     (H) 05/06/2019    ALKPHOS 321 (H) 05/06/2019    BILITOT 5.3 (H) 05/06/2019     No results found for: " AFP  Lab Results   Component Value Date    LIPASE 69 (H) 05/05/2019     No results found for: TACROLIMUS    Imaging:  Reviewed and as noted in HPI.

## 2019-05-06 NOTE — PLAN OF CARE
Problem: Adult Inpatient Plan of Care  Goal: Plan of Care Review  Pt denies pain, had ESRP procedure today. No nausea, pt had 1 BM today, IV fluids cont. On clear liquids now

## 2019-05-06 NOTE — DISCHARGE INSTRUCTIONS
ERCP (Endoscopic Retrograde Cholangiopancreatography)     A balloon at the tip of a catheter opens above the stone. The stone is pulled out of the duct and leaves your body through stool.     ERCP stands for endoscopic retrograde cholangiopancreatography. This procedure is used to view the biliary and pancreatic ducts.  It is used to evaluate diseases that affect the ducts and to help locate and treat blockages that may be present.  How do I get ready for ERCP?  · Talk to your doctor about any health problems or allergies you have.  · Ask your doctor about the risks of ERCP. These include pancreatitis, infection, bleeding, and tearing the bowel.  · Be sure your doctor knows about all medicines you take. You may be told to stop taking some or all of them before the test. This includes:  · All prescription medicines  · Over-the-counter medicines that don't need a prescription  ·  Any street drugs you may use   · Herbs, vitamins, kelp, seaweed, cough syrups, and other supplements  · You may be asked to take antibiotics ahead of time.  · Avoid blood-thinning medicines for 1 week before ERCP.  · Do not eat or drink for 8 to 12 hours before ERCP.  · Have someone ready to take you home.  What happens during the procedure?  · You may be given medicine through an IV to help you relax.  · Your throat is numbed.  · A thin tube (endoscope) is placed into your throat. It is advanced from the throat through the upper digestive tract, to the common bile duct opening. The endoscope lets the doctor see the common bile and pancreatic ducts on a video screen.  · A cut may be made where the common bile duct opens to the duodenum to make it easier to remove stones.  · As blockages are located and removed, X-rays are taken.  · Contrast dye is injected through a catheter to make the duct show up better on the X-rays.  · An imaging technique that uses X-rays to obtain real-time moving images of internal organs is called fluoroscopy.  Fluoroscopy is used to watch and guide progress of the procedure.   · In some cases, a plastic tube (stent) is placed to hold the ducts open. This stent may be replaced or removed in 6 to 8 weeks. Or it may be left to fall out on its own and be passed in the stool.  What happens after ERCP?  Your doctor may discuss the test results right away or a return visit may be scheduled. You may go home the same day or spend the night in the hospital. Follow these tips:  · You can return to a normal routine the day after the ERCP.  · If a cut was made in the duct, avoid blood-thinning medicines such as aspirin for 5 to 7 days.  · Call your doctor right away if you have a fever or abdominal pain. These may be signs of an infection or torn bowel.   Date Last Reviewed: 6/19/2015  © 6380-4900 [employer]. [address] [place] [address], [place] [address]. All rights reserved. This information is not intended as a substitute for professional medical care. Always follow your healthcare professional's instructions.

## 2019-05-06 NOTE — ED NOTES
Patient transported in wheelchair with IV infusion running to [facility]. NAD noted, no complaints at this time. All patient's belongings with patient. Transporter given surgery consent forms with instructions to give to RN or unit secretary upon arrival to floor.

## 2019-05-06 NOTE — PROGRESS NOTES
[facility]  General Surgery  Progress Note    Subjective:     History of Present Illness:  No notes on file    Post-Op Info:  * No surgery found *         Interval History: No acute events overnight. Pain improved.     Medications:  Continuous Infusions:   dextrose 5 % and 0.9 % NaCl with KCl 20 mEq 125 mL/hr at 05/06/19 0019     Scheduled Meds:   enoxaparin  40 mg Subcutaneous Daily    lidocaine (PF) 10 mg/ml (1%)  1 mL Other Once    simvastatin  20 mg Oral QHS    sodium chloride 0.9%  3 mL Intravenous Q8H     PRN Meds:diphenhydrAMINE, hydrALAZINE, HYDROmorphone, ondansetron, ramelteon     Review of patient's allergies indicates:   Allergen Reactions    Meloxicam Rash     Flushing       Objective:     Vital Signs (Most Recent):  Temp: 98.8 °F (37.1 °C) (05/06/19 0132)  Pulse: 80 (05/06/19 0132)  Resp: 16 (05/06/19 0132)  BP: 137/61 (05/06/19 0132)  SpO2: 96 % (05/06/19 0132) Vital Signs (24h Range):  Temp:  [98.2 °F (36.8 °C)-98.8 °F (37.1 °C)] 98.8 °F (37.1 °C)  Pulse:  [76-91] 80  Resp:  [15-18] 16  SpO2:  [96 %-99 %] 96 %  BP: (131-212)/(59-88) 137/61     Weight: 61 kg (134 lb 7.7 oz)  Body mass index is 23.08 kg/m².    Intake/Output - Last 3 Shifts     None          Physical Exam   Constitutional: She appears well-developed and well-nourished. No distress.   HENT:   Head: Normocephalic and atraumatic.   Eyes: Conjunctivae are normal.   Neck: Normal range of motion.   Cardiovascular: Normal rate.   Pulmonary/Chest: Effort normal.   Abdominal: Soft. There is no tenderness.   Neurological: She is alert.   Skin: Skin is warm and dry.   Psychiatric: She has a normal mood and affect. Her behavior is normal.   Nursing note and vitals reviewed.      Significant Labs:  CBC:   Recent Labs   Lab 05/06/19  0431   WBC 7.91   RBC 4.42   HGB 13.7   HCT 42.7      MCV 97   MCH 31.0   MCHC 32.1     CMP:   Recent Labs   Lab 05/06/19  0431   *   CALCIUM 9.7   ALBUMIN 3.8   PROT 7.0       K 4.5   CO2 25      BUN 14   CREATININE 0.8   ALKPHOS 321*   *   *   BILITOT 5.3*       Significant Diagnostics:  I have reviewed all pertinent imaging results/findings within the past 24 hours.    Assessment/Plan:     Epigastric abdominal pain  73-year-old female presented with abdominal pain found to have evidence of choledocholithiasis without cholecystitis.     -AES consult today for ERCP.   -lap bharat pending AES recs  -NPO  -mIVF  -replace lytes  -home meds  -GI and DVT ppx        Ethan iPedra MD  General Surgery  [facility]

## 2019-05-06 NOTE — ANESTHESIA PREPROCEDURE EVALUATION
2019  Torie Richard is a 73 y.o., female.  Past Medical History:   Diagnosis Date    Hyperlipidemia     Hypertension      Patient Active Problem List   Diagnosis    Mixed hyperlipidemia    Insomnia    Rosacea    Chronic low back pain    Essential hypertension, benign    Epigastric abdominal pain    Biliary obstruction    Abnormal US (ultrasound) of abdomen    Transaminitis     Past Surgical History:   Procedure Laterality Date     SECTION      EXCISIONAL HEMORRHOIDECTOMY      TONSILLECTOMY       Review of patient's allergies indicates:   Allergen Reactions    Meloxicam Rash     Flushing         Anesthesia Evaluation    I have reviewed the Patient Summary Reports.    I have reviewed the Nursing Notes.   I have reviewed the Medications.     Review of Systems  Anesthesia Hx:  No problems with previous Anesthesia    Cardiovascular:   Hypertension        Physical Exam  General:  Well nourished    Airway/Jaw/Neck:  Airway Findings: Mouth Opening: Normal Tongue: Normal  General Airway Assessment: Adult  Mallampati: II  TM Distance: 4 - 6 cm  Jaw/Neck Findings:  Neck ROM: Normal ROM      Dental:  Dental Findings: In tact        Mental Status:  Mental Status Findings:  Cooperative, Alert and Oriented         Anesthesia Plan  Type of Anesthesia, risks & benefits discussed:  Anesthesia Type:  general  Patient's Preference:   Intra-op Monitoring Plan: standard ASA monitors  Intra-op Monitoring Plan Comments:   Post Op Pain Control Plan: per primary service following discharge from PACU and multimodal analgesia  Post Op Pain Control Plan Comments:   Induction:   IV  Beta Blocker:  Patient is not currently on a Beta-Blocker (No further documentation required).       Informed Consent: Patient understands risks and agrees with Anesthesia plan.  Questions answered. Anesthesia consent signed  with patient.  ASA Score: 2     Day of Surgery Review of History & Physical:    H&P update referred to the surgeon.         Ready For Surgery From Anesthesia Perspective.

## 2019-05-06 NOTE — H&P
Surgery H and P  Attending: Chiara  Resident: Len   CC: Epigastric/ RUQ pain    HPI: Torie Richard is a pleasant 73 y.o. woman, hx HTN and HLD, who presents to ED for epigastric pain x 3 days.    She has had two episodes of epigastric pain in the past, which resolved spontaneously.  On this occasion, pain has been sustained.  There is bloating associated with it.  It is in the RUQ as well and radiates to the mid back.  There is no nausea and vomiting (unlike the last two episodes).      She is otherwise healthy.  Has had a c section.      Past Medical History:   Diagnosis Date    Hyperlipidemia     Hypertension        Past Surgical History:   Procedure Laterality Date     SECTION      EXCISIONAL HEMORRHOIDECTOMY      TONSILLECTOMY         Family History   Problem Relation Age of Onset    Hypertension Mother     Hyperlipidemia Mother     Sudden death Mother     Heart disease Father         MI at 58    Hypertension Father     Hyperlipidemia Father     Hypertension Sister     Hyperlipidemia Sister     Depression Sister     COPD Brother     Breast cancer Maternal Aunt     Breast cancer Paternal Aunt     Colon cancer Neg Hx     Ovarian cancer Neg Hx        Social History     Socioeconomic History    Marital status:      Spouse name: Not on file    Number of children: Not on file    Years of education: Not on file    Highest education level: Not on file   Occupational History     Employer: [employer]   Social Needs    Financial resource strain: Not on file    Food insecurity:     Worry: Not on file     Inability: Not on file    Transportation needs:     Medical: Not on file     Non-medical: Not on file   Tobacco Use    Smoking status: Former Smoker     Types: Cigarettes     Last attempt to quit: 10/18/1981     Years since quittin.5    Smokeless tobacco: Never Used   Substance and Sexual Activity    Alcohol use: Yes     Alcohol/week: 1.0 oz     Types: 2 Standard  drinks or equivalent per week     Comment: social     Drug use: No    Sexual activity: Yes     Partners: Male   Lifestyle    Physical activity:     Days per week: Not on file     Minutes per session: Not on file    Stress: Not on file   Relationships    Social connections:     Talks on phone: Not on file     Gets together: Not on file     Attends Yazidi service: Not on file     Active member of club or organization: Not on file     Attends meetings of clubs or organizations: Not on file     Relationship status: Not on file   Other Topics Concern    Are you pregnant or think you may be? Not Asked    Breast-feeding Not Asked   Social History Narrative    Not on file       No current facility-administered medications on file prior to encounter.      Current Outpatient Medications on File Prior to Encounter   Medication Sig Dispense Refill    acetaminophen (TYLENOL) 500 MG tablet Take 500 mg by mouth every 6 (six) hours as needed for Pain.      bismuth subsalicylate (PEPTO BISMOL) 262 mg/15 mL suspension Take 15 mLs by mouth as needed for Indigestion.      calcium carbonate (TUMS) 200 mg calcium (500 mg) chewable tablet Take 1 tablet by mouth as needed for Heartburn.      doxylamine succinate (SLEEP AID, DOXYLAMINE,) 25 mg tablet Take 25 mg by mouth nightly as needed.        ibuprofen (ADVIL,MOTRIN) 200 MG tablet Take 200 mg by mouth every 6 (six) hours as needed.      losartan (COZAAR) 25 MG tablet Take 1 tablet (25 mg total) by mouth once daily. 90 tablet 3    melatonin 5 mg Tab Take 5 mg by mouth.       naproxen sodium (ANAPROX) 220 MG tablet       simvastatin (ZOCOR) 20 MG tablet TAKE 1 TABLET (20 MG TOTAL) BY MOUTH EVERY EVENING. 90 tablet 3    [DISCONTINUED] alclomethasone (ACLOVATE) 0.05 % ointment AAA lips bid 30 g 3       Review of patient's allergies indicates:   Allergen Reactions    Meloxicam Rash     Flushing         ROS:   Constitutional: no fever or chills, pain controlled   Eyes: No  "eye pain or diplopia   Ears: No change in hearing or tinnitus  Nose: No epistaxis or frequent colds  Respiratory: No cough or dyspnea  Cardiovascular: no chest pain or palpitations   Gastrointestinal: + abdominal pain - vomiting   Genitourinary: no hematuria or dysuria   Hematologic/Lymphatic: no easy bruising or lymphadenopathy   Musculoskeletal: no arthralgias or myalgias   Neurological: no seizures or tremors   Skin: No rashes or itching    Phys:  Vitals:    05/05/19 1823 05/05/19 2215   BP: (!) 205/88 (!) 212/88   BP Location: Right arm    Patient Position: Sitting    Pulse: 91 76   Resp: 18 16   Temp: 98.2 °F (36.8 °C) 98.2 °F (36.8 °C)   TempSrc: Oral    SpO2: 98% 99%   Weight: 61.2 kg (135 lb)    Height: 5' 4" (1.626 m)           Phys:   Gen: NAD   HEENT: NCAT, trachea midline  CV: RRR, no m/r/g   Pulm: Unlabored  Abd: Soft, nttp. No rebound, guarding. Well healed Pfannenstiel incision  Extremities: no cyanosis or edema, or clubbing  Skin: Skin color, texture, turgor normal. No rashes or lesions     WBC 7.7  Bili 5.4    US: CBD stone, CBD and intrahepatic dilation    A/P CBD stone, no concern for cholangitis at this time    Admit to inpatient, Dr. Chiara CARR p midnight, IVF  Pain and nausea control  AES consult in the morning to consider ERCP  Will need cholecystectomy prior to discharge  Consent obtained today  Lovenox (not today in case of ERCP tomorrow)    HTN: Home meds and PRN hydralazine    Cortez Harrington MD  General Surgery, PGY-5  [phone]     "

## 2019-05-06 NOTE — SUBJECTIVE & OBJECTIVE
Interval History: No acute events overnight. Pain improved.     Medications:  Continuous Infusions:   dextrose 5 % and 0.9 % NaCl with KCl 20 mEq 125 mL/hr at 05/06/19 0019     Scheduled Meds:   enoxaparin  40 mg Subcutaneous Daily    lidocaine (PF) 10 mg/ml (1%)  1 mL Other Once    simvastatin  20 mg Oral QHS    sodium chloride 0.9%  3 mL Intravenous Q8H     PRN Meds:diphenhydrAMINE, hydrALAZINE, HYDROmorphone, ondansetron, ramelteon     Review of patient's allergies indicates:   Allergen Reactions    Meloxicam Rash     Flushing       Objective:     Vital Signs (Most Recent):  Temp: 98.8 °F (37.1 °C) (05/06/19 0132)  Pulse: 80 (05/06/19 0132)  Resp: 16 (05/06/19 0132)  BP: 137/61 (05/06/19 0132)  SpO2: 96 % (05/06/19 0132) Vital Signs (24h Range):  Temp:  [98.2 °F (36.8 °C)-98.8 °F (37.1 °C)] 98.8 °F (37.1 °C)  Pulse:  [76-91] 80  Resp:  [15-18] 16  SpO2:  [96 %-99 %] 96 %  BP: (131-212)/(59-88) 137/61     Weight: 61 kg (134 lb 7.7 oz)  Body mass index is 23.08 kg/m².    Intake/Output - Last 3 Shifts     None          Physical Exam   Constitutional: She appears well-developed and well-nourished. No distress.   HENT:   Head: Normocephalic and atraumatic.   Eyes: Conjunctivae are normal.   Neck: Normal range of motion.   Cardiovascular: Normal rate.   Pulmonary/Chest: Effort normal.   Abdominal: Soft. There is no tenderness.   Neurological: She is alert.   Skin: Skin is warm and dry.   Psychiatric: She has a normal mood and affect. Her behavior is normal.   Nursing note and vitals reviewed.      Significant Labs:  CBC:   Recent Labs   Lab 05/06/19 0431   WBC 7.91   RBC 4.42   HGB 13.7   HCT 42.7      MCV 97   MCH 31.0   MCHC 32.1     CMP:   Recent Labs   Lab 05/06/19 0431   *   CALCIUM 9.7   ALBUMIN 3.8   PROT 7.0      K 4.5   CO2 25      BUN 14   CREATININE 0.8   ALKPHOS 321*   *   *   BILITOT 5.3*       Significant Diagnostics:  I have reviewed all pertinent imaging  results/findings within the past 24 hours.

## 2019-05-06 NOTE — ASSESSMENT & PLAN NOTE
73-year-old female presented with abdominal pain found to have evidence of choledocholithiasis without cholecystitis.     -AES consult today for ERCP.   -lap bharat pending AES recs  -NPO  -mIVF  -replace lytes  -home meds  -GI and DVT ppx

## 2019-05-06 NOTE — TRANSFER OF CARE
"Anesthesia Transfer of Care Note    Patient: Torie Richard    Procedure(s) Performed: Procedure(s) (LRB):  ERCP (ENDOSCOPIC RETROGRADE CHOLANGIOPANCREATOGRAPHY) (N/A)    Patient location: PACU    Anesthesia Type: general    Transport from OR: Transported from OR on room air with adequate spontaneous ventilation    Post pain: adequate analgesia    Post assessment: no apparent anesthetic complications    Post vital signs: stable    Level of consciousness: awake    Nausea/Vomiting: no nausea/vomiting    Complications: none    Transfer of care protocol was followed      Last vitals:   Visit Vitals  BP (!) 149/77   Pulse 84   Temp 36.7 °C (98.1 °F) (Temporal)   Resp 15   Ht 5' 4" (1.626 m)   Wt 61 kg (134 lb 7.7 oz)   SpO2 100%   Breastfeeding? No   BMI 23.08 kg/m²     "

## 2019-05-07 ENCOUNTER — ANESTHESIA EVENT (OUTPATIENT)
Dept: SURGERY | Facility: HOSPITAL | Age: 74
DRG: 418 | End: 2019-05-07
Payer: MEDICARE

## 2019-05-07 PROBLEM — K80.51 CHOLEDOCHOLITHIASIS WITH OBSTRUCTION: Status: ACTIVE | Noted: 2019-05-07

## 2019-05-07 LAB
ALBUMIN SERPL BCP-MCNC: 2.8 G/DL (ref 3.5–5.2)
ALP SERPL-CCNC: 251 U/L (ref 55–135)
ALT SERPL W/O P-5'-P-CCNC: 404 U/L (ref 10–44)
ANION GAP SERPL CALC-SCNC: 7 MMOL/L (ref 8–16)
AST SERPL-CCNC: 111 U/L (ref 10–40)
BASOPHILS # BLD AUTO: 0.03 K/UL (ref 0–0.2)
BASOPHILS NFR BLD: 0.6 % (ref 0–1.9)
BILIRUB SERPL-MCNC: 2.6 MG/DL (ref 0.1–1)
BUN SERPL-MCNC: 8 MG/DL (ref 8–23)
CALCIUM SERPL-MCNC: 8.2 MG/DL (ref 8.7–10.5)
CHLORIDE SERPL-SCNC: 114 MMOL/L (ref 95–110)
CO2 SERPL-SCNC: 22 MMOL/L (ref 23–29)
CREAT SERPL-MCNC: 0.6 MG/DL (ref 0.5–1.4)
DIFFERENTIAL METHOD: ABNORMAL
EOSINOPHIL # BLD AUTO: 0.1 K/UL (ref 0–0.5)
EOSINOPHIL NFR BLD: 2.8 % (ref 0–8)
ERYTHROCYTE [DISTWIDTH] IN BLOOD BY AUTOMATED COUNT: 12.9 % (ref 11.5–14.5)
EST. GFR  (AFRICAN AMERICAN): >60 ML/MIN/1.73 M^2
EST. GFR  (NON AFRICAN AMERICAN): >60 ML/MIN/1.73 M^2
GLUCOSE SERPL-MCNC: 98 MG/DL (ref 70–110)
HCT VFR BLD AUTO: 38.1 % (ref 37–48.5)
HGB BLD-MCNC: 12.2 G/DL (ref 12–16)
IMM GRANULOCYTES # BLD AUTO: 0.01 K/UL (ref 0–0.04)
IMM GRANULOCYTES NFR BLD AUTO: 0.2 % (ref 0–0.5)
LYMPHOCYTES # BLD AUTO: 1.7 K/UL (ref 1–4.8)
LYMPHOCYTES NFR BLD: 32.6 % (ref 18–48)
MAGNESIUM SERPL-MCNC: 1.7 MG/DL (ref 1.6–2.6)
MCH RBC QN AUTO: 31.3 PG (ref 27–31)
MCHC RBC AUTO-ENTMCNC: 32 G/DL (ref 32–36)
MCV RBC AUTO: 98 FL (ref 82–98)
MONOCYTES # BLD AUTO: 0.5 K/UL (ref 0.3–1)
MONOCYTES NFR BLD: 9 % (ref 4–15)
NEUTROPHILS # BLD AUTO: 2.8 K/UL (ref 1.8–7.7)
NEUTROPHILS NFR BLD: 54.8 % (ref 38–73)
NRBC BLD-RTO: 0 /100 WBC
PHOSPHATE SERPL-MCNC: 2.2 MG/DL (ref 2.7–4.5)
PLATELET # BLD AUTO: 229 K/UL (ref 150–350)
PMV BLD AUTO: 10.3 FL (ref 9.2–12.9)
POTASSIUM SERPL-SCNC: 3.9 MMOL/L (ref 3.5–5.1)
PROT SERPL-MCNC: 5.4 G/DL (ref 6–8.4)
RBC # BLD AUTO: 3.9 M/UL (ref 4–5.4)
SODIUM SERPL-SCNC: 143 MMOL/L (ref 136–145)
WBC # BLD AUTO: 5.09 K/UL (ref 3.9–12.7)

## 2019-05-07 PROCEDURE — A4216 STERILE WATER/SALINE, 10 ML: HCPCS | Performed by: INTERNAL MEDICINE

## 2019-05-07 PROCEDURE — 99232 SBSQ HOSP IP/OBS MODERATE 35: CPT | Mod: GC,,, | Performed by: SURGERY

## 2019-05-07 PROCEDURE — 63600175 PHARM REV CODE 636 W HCPCS: Performed by: STUDENT IN AN ORGANIZED HEALTH CARE EDUCATION/TRAINING PROGRAM

## 2019-05-07 PROCEDURE — 25000003 PHARM REV CODE 250: Performed by: INTERNAL MEDICINE

## 2019-05-07 PROCEDURE — 25000003 PHARM REV CODE 250: Performed by: STUDENT IN AN ORGANIZED HEALTH CARE EDUCATION/TRAINING PROGRAM

## 2019-05-07 PROCEDURE — 63600175 PHARM REV CODE 636 W HCPCS: Performed by: INTERNAL MEDICINE

## 2019-05-07 PROCEDURE — 97165 OT EVAL LOW COMPLEX 30 MIN: CPT

## 2019-05-07 PROCEDURE — 84100 ASSAY OF PHOSPHORUS: CPT

## 2019-05-07 PROCEDURE — 36415 COLL VENOUS BLD VENIPUNCTURE: CPT

## 2019-05-07 PROCEDURE — 80053 COMPREHEN METABOLIC PANEL: CPT

## 2019-05-07 PROCEDURE — 97161 PT EVAL LOW COMPLEX 20 MIN: CPT

## 2019-05-07 PROCEDURE — 85025 COMPLETE CBC W/AUTO DIFF WBC: CPT

## 2019-05-07 PROCEDURE — 20600001 HC STEP DOWN PRIVATE ROOM

## 2019-05-07 PROCEDURE — 99232 PR SUBSEQUENT HOSPITAL CARE,LEVL II: ICD-10-PCS | Mod: GC,,, | Performed by: SURGERY

## 2019-05-07 PROCEDURE — 83735 ASSAY OF MAGNESIUM: CPT

## 2019-05-07 RX ORDER — ACETAMINOPHEN 325 MG/1
650 TABLET ORAL EVERY 6 HOURS PRN
Status: DISCONTINUED | OUTPATIENT
Start: 2019-05-07 | End: 2019-05-11 | Stop reason: HOSPADM

## 2019-05-07 RX ORDER — SODIUM CHLORIDE 9 MG/ML
INJECTION, SOLUTION INTRAVENOUS CONTINUOUS
Status: DISCONTINUED | OUTPATIENT
Start: 2019-05-08 | End: 2019-05-09

## 2019-05-07 RX ADMIN — PIPERACILLIN AND TAZOBACTAM 4.5 G: 4; .5 INJECTION, POWDER, LYOPHILIZED, FOR SOLUTION INTRAVENOUS; PARENTERAL at 12:05

## 2019-05-07 RX ADMIN — ENOXAPARIN SODIUM 40 MG: 100 INJECTION SUBCUTANEOUS at 04:05

## 2019-05-07 RX ADMIN — SODIUM GLYCOLATE 15 MMOL: 216 INJECTION, SOLUTION INTRAVENOUS at 04:05

## 2019-05-07 RX ADMIN — Medication 3 ML: at 09:05

## 2019-05-07 RX ADMIN — Medication 3 ML: at 06:05

## 2019-05-07 RX ADMIN — Medication 3 ML: at 02:05

## 2019-05-07 RX ADMIN — PIPERACILLIN AND TAZOBACTAM 4.5 G: 4; .5 INJECTION, POWDER, LYOPHILIZED, FOR SOLUTION INTRAVENOUS; PARENTERAL at 08:05

## 2019-05-07 RX ADMIN — SIMVASTATIN 20 MG: 20 TABLET, FILM COATED ORAL at 08:05

## 2019-05-07 RX ADMIN — DIPHENHYDRAMINE HYDROCHLORIDE 25 MG: 25 CAPSULE ORAL at 08:05

## 2019-05-07 NOTE — ASSESSMENT & PLAN NOTE
73-year-old female presented with abdominal pain found to have evidence of choledocholithiasis without cholecystitis. Now s/p ERCP with stone removal. Pus encountered. Started zosyn. T bili downtrending.     -advance to regular diet  -continue zosyn for pus in CBD. Follow cultures  -plan for lap bharat tomorrow, consent obtained  -NPO midnight  -d/c mIVF  -tylenol for headaches.   -replace lytes  -home meds  -GI and DVT ppx

## 2019-05-07 NOTE — TREATMENT PLAN
AES Follow-up Note    S/P ERCP for CBD stone removal.  Patient was seen and examined. Labs reviewed.   No abdominal discomfort. Tolerating diet.    Continues to be afebrile.   No sx/sx of post-ERCP pancreatitis.  Surgery team will proceed with CCY.  AES will sign off. Please call if any questions/concerns.     Siddharth Ignacio MD  Gastroenterology & Hepatology Fellow   Pager: [phone]

## 2019-05-07 NOTE — PT/OT/SLP EVAL
Occupational Therapy   Evaluation and Discharge Note    Name: Torie Richard  MRN: [id number]  Admitting Diagnosis:  Choledocholithiasis with obstruction 1 Day Post-Op    Recommendations:     Discharge Recommendations: home  Discharge Equipment Recommendations:  none  Barriers to discharge:  None    Assessment:     Torie Richard is a 73 y.o. female with a medical diagnosis of Choledocholithiasis with obstruction. At this time, patient is functioning at their prior level of function and does not require further acute OT services.     Plan:     During this hospitalization, patient does not require further acute OT services.  Please re-consult if situation changes.    · Plan of Care Reviewed with: patient    Subjective     Chief Complaint: being connected to IV  Patient/Family Comments/goals: return to PLOF    Occupational Profile:  Living Environment: Pt lives with her older brother in 1SH with 5STE and BHR. Home has tub/shower with grab bar  Previous level of function: PTA, pt reports independence with all ADL and IADL, including driving. Pt was not using any AD for ambulation or ADL. Pt enjoys gardening and spending time with her grandchildren  Roles and Routines: mother, grandmother,    Equipment Used at home:  none  Assistance upon Discharge: Family assist as needed following d/c    Pain/Comfort:  · Pain Rating 1: 0/10  · Pain Addressed 1: Reposition, Cessation of Activity, Distraction  · Pain Rating Post-Intervention 1: 0/10    Patients cultural, spiritual, Cheondoism conflicts given the current situation: no    Objective:     Communicated with: RN prior to session.  Patient found HOB elevated with peripheral IV upon OT entry to room.    General Precautions: Standard, fall   Orthopedic Precautions:N/A   Braces: N/A     Occupational Performance:    Bed Mobility:    · Patient completed Supine to Sit with independence    Functional Mobility/Transfers:  · Patient completed Sit <> Stand Transfer with  independence  with  no assistive device   · Patient completed Bed <> Chair Transfer using Step Transfer technique with independence with no assistive device  · Functional Mobility: Pt ambulate 300 ft with independence using no AD; pt ascend/descend 5 stairs with independence using no handrails    Activities of Daily Living:  · Upper Body Dressing: setup assistance to don backward gown while seated EOB  · Toileting: Pt report independence with all toileting in bathroom    Cognitive/Visual Perceptual:  Cognitive/Psychosocial Skills:     -       Oriented to: Person, Place, Time and Situation   -       Follows Commands/attention:Follows multistep  commands  -       Communication: clear/fluent  -       Memory: No Deficits noted  -       Safety awareness/insight to disability: intact   -       Mood/Affect/Coping skills/emotional control: Appropriate to situation  Visual/Perceptual:      -Pt wears reading glasses; hearing intact    Physical Exam:  Balance:    -       good sitting/standing balance  Postural examination/scapula alignment:    -       No postural abnormalities identified  Skin integrity: Visible skin intact  Edema:  Mild RUE; addressed by RN following evaluation  Sensation:    -       Intact  Dominant hand:    -       R hand  Upper Extremity Range of Motion:     -       Right Upper Extremity: WFL  -       Left Upper Extremity: WFL  Upper Extremity Strength:    -       Right Upper Extremity: WFL  -       Left Upper Extremity: WFL   Strength:    -       Right Upper Extremity: WFL  -       Left Upper Extremity: WFL  Fine Motor Coordination:    -       Intact  Gross motor coordination:   WFL    AMPAC 6 Click ADL:  AMPAC Total Score: 24    Treatment & Education:  Pt educated on role of OT/POC  Pt educated on importance of ambulation/UIC  White board/communication board updated  Education:    Patient left up in chair with all lines intact, call button in reach and RN notified    GOALS:   Multidisciplinary  Problems     Occupational Therapy Goals     Not on file          Multidisciplinary Problems (Resolved)        Problem: Occupational Therapy Goal    Goal Priority Disciplines Outcome Interventions   Occupational Therapy Goal   (Resolved)     OT, PT/OT Outcome(s) achieved                    History:     Past Medical History:   Diagnosis Date    Hyperlipidemia     Hypertension        Past Surgical History:   Procedure Laterality Date     SECTION      ERCP (ENDOSCOPIC RETROGRADE CHOLANGIOPANCREATOGRAPHY) N/A 2019    Performed by Jr Cantor MD at [facility] ([address] [place] [address])    EXCISIONAL HEMORRHOIDECTOMY      TONSILLECTOMY         Time Tracking:     OT Date of Treatment: 19  OT Start Time: 821  OT Stop Time: 831  OT Total Time (min): 10 min    Billable Minutes:Evaluation 10    Keisha Maria OT  2019

## 2019-05-07 NOTE — PLAN OF CARE
Patient lives in a 1 story house, w/4-5 RADHA, w/yamini, w/her brother. Daughter is at her BS. Patient is independent & agile. No needs determined.     [facility] given to patient after informed about it;patient verbalized their understanding.        05/07/19 1210   Discharge Assessment   Assessment Type Discharge Planning Assessment   Confirmed/corrected address and phone number on facesheet? Yes   Assessment information obtained from? Patient;Medical Record   Expected Length of Stay (days)   (4)   Communicated expected length of stay with patient/caregiver no  (Per MD)   Prior to hospitilization cognitive status: Alert/Oriented;No Deficits   Prior to hospitalization functional status: Independent   Current cognitive status: Alert/Oriented;No Deficits   Current Functional Status: Independent   Facility Arrived From:   (N/A)   Lives With sibling(s)  (Brother-Kenney)   Able to Return to Prior Arrangements yes   Is patient able to care for self after discharge? Yes   Who are your caregiver(s) and their phone number(s)?   (1. Izzy Wells Daughter     [phone]     2. Isrrael Richard Son [phone]   )   Patient's perception of discharge disposition home or selfcare   Readmission Within the Last 30 Days no previous admission in last 30 days   Patient currently being followed by outpatient case management? No   Patient currently receives any other outside agency services? No   Equipment Currently Used at Home none   Do you have any problems affording any of your prescribed medications? No   Is the patient taking medications as prescribed? yes   Does the patient have transportation home? Yes   Transportation Anticipated family or friend will provide   Dialysis Name and Scheduled days   (N/A)   Does the patient receive services at the [facility]? No   Discharge Plan A Home with family   Discharge Plan B Home with family   DME Needed Upon Discharge  none   Patient/Family in Agreement  with Plan yes

## 2019-05-07 NOTE — ANESTHESIA PREPROCEDURE EVALUATION
[facility]  Anesthesia Pre-Operative Evaluation         Patient Name: Torie Richard  YOB: 1945  MRN: [id number]    SUBJECTIVE:     Pre-operative evaluation for Procedure(s) (LRB):  CHOLECYSTECTOMY, LAPAROSCOPIC (N/A)     05/07/2019    Torie Richard is a 73 y.o. female w/ a significant PMHx of HTN presented to [facility] on 5/6 with abdominal pain found to have evidence of choledocholithiasis without cholecystitis. Now s/p ERCP which showed pus and stone removed from CBD. Started zosyn.    Patient currently afebrile and HDS on room air. Labs with downtrending TBili and transaminitis, otherwise unremarkable. Patient now presents for the above procedure(s).      LDA:       Peripheral IV - Single Lumen 05/05/19 1900 20 G Right Wrist (Active)   Number of days: 1       Prev airway: None documented.    Drips:   sodium chloride 0.9%         Patient Active Problem List   Diagnosis    Mixed hyperlipidemia    Insomnia    Rosacea    Chronic low back pain    Essential hypertension, benign    Epigastric abdominal pain    Biliary obstruction    Abnormal US (ultrasound) of abdomen    Transaminitis       Review of patient's allergies indicates:   Allergen Reactions    Meloxicam Rash     Flushing         Current Inpatient Medications:   enoxaparin  40 mg Subcutaneous Daily    piperacillin-tazobactam (ZOSYN) IVPB  4.5 g Intravenous Q8H    simvastatin  20 mg Oral QHS    sodium chloride 0.9%  3 mL Intravenous Q8H       No current facility-administered medications on file prior to encounter.      Current Outpatient Medications on File Prior to Encounter   Medication Sig Dispense Refill    acetaminophen (TYLENOL) 500 MG tablet Take 500 mg by mouth every 6 (six) hours as needed for Pain.      bismuth subsalicylate (PEPTO BISMOL) 262 mg/15 mL suspension Take 15 mLs by mouth as needed for Indigestion.      calcium carbonate (TUMS) 200 mg calcium (500 mg) chewable tablet Take 1 tablet by  mouth as needed for Heartburn.      doxylamine succinate (SLEEP AID, DOXYLAMINE,) 25 mg tablet Take 25 mg by mouth nightly as needed.        ibuprofen (ADVIL,MOTRIN) 200 MG tablet Take 200 mg by mouth every 6 (six) hours as needed.      losartan (COZAAR) 25 MG tablet Take 1 tablet (25 mg total) by mouth once daily. 90 tablet 3    melatonin 5 mg Tab Take 5 mg by mouth.       naproxen sodium (ANAPROX) 220 MG tablet       simvastatin (ZOCOR) 20 MG tablet TAKE 1 TABLET (20 MG TOTAL) BY MOUTH EVERY EVENING. 90 tablet 3       Past Surgical History:   Procedure Laterality Date     SECTION      EXCISIONAL HEMORRHOIDECTOMY      TONSILLECTOMY         Social History     Socioeconomic History    Marital status:      Spouse name: Not on file    Number of children: Not on file    Years of education: Not on file    Highest education level: Not on file   Occupational History     Employer: [employer]   Social Needs    Financial resource strain: Not on file    Food insecurity:     Worry: Not on file     Inability: Not on file    Transportation needs:     Medical: Not on file     Non-medical: Not on file   Tobacco Use    Smoking status: Former Smoker     Types: Cigarettes     Last attempt to quit: 10/18/1981     Years since quittin.5    Smokeless tobacco: Never Used   Substance and Sexual Activity    Alcohol use: Yes     Alcohol/week: 1.0 oz     Types: 2 Standard drinks or equivalent per week     Comment: social     Drug use: No    Sexual activity: Yes     Partners: Male   Lifestyle    Physical activity:     Days per week: Not on file     Minutes per session: Not on file    Stress: Not on file   Relationships    Social connections:     Talks on phone: Not on file     Gets together: Not on file     Attends Zoroastrianism service: Not on file     Active member of club or organization: Not on file     Attends meetings of clubs or organizations: Not on file     Relationship status: Not on  file   Other Topics Concern    Are you pregnant or think you may be? Not Asked    Breast-feeding Not Asked   Social History Narrative    Not on file       OBJECTIVE:     Vital Signs Range (Last 24H):  Temp:  [36.1 °C (97 °F)-36.9 °C (98.4 °F)]   Pulse:  [75-85]   Resp:  [14-17]   BP: (122-153)/(60-77)   SpO2:  [96 %-100 %]       Significant Labs:  Lab Results   Component Value Date    WBC 5.09 05/07/2019    HGB 12.2 05/07/2019    HCT 38.1 05/07/2019     05/07/2019    CHOL 156 03/14/2019    TRIG 90 03/14/2019    HDL 60 03/14/2019     (H) 05/07/2019     (H) 05/07/2019     05/07/2019    K 3.9 05/07/2019     (H) 05/07/2019    CREATININE 0.6 05/07/2019    BUN 8 05/07/2019    CO2 22 (L) 05/07/2019    TSH 0.690 05/02/2016       Diagnostic Studies: No relevant studies.    EKG (05/05/2019):   Sinus rhythm with marked sinus arrythmia  Otherwise normal ECG  No previous ECGs available  Confirmed by Waldo CLARK, Macario ([id number]) on 5/6/2019 12:53:42 AM    2D ECHO:  No results found for this or any previous visit.      ASSESSMENT/PLAN:         Anesthesia Evaluation    I have reviewed the Patient Summary Reports.     I have reviewed the Medications.     Review of Systems  Anesthesia Hx:  No problems with previous Anesthesia  History of prior surgery of interest to airway management or planning:  Denies Personal Hx of Anesthesia complications.   Social:  Former Smoker    Hematology/Oncology:         -- Denies Anemia:   Cardiovascular:   Hypertension Denies CAD.       Pulmonary:   Denies COPD.  Denies Asthma.  Denies Recent URI.    Renal/:   Denies Chronic Renal Disease.     Hepatic/GI:   Denies GERD.    Neurological:   Denies TIA. Denies CVA.    Endocrine:   Denies Diabetes.        Physical Exam  General:  Well nourished    Airway/Jaw/Neck:  Airway Findings: Mouth Opening: Normal Tongue: Normal  General Airway Assessment: Adult  Mallampati: I  TM Distance: Normal, at least 6 cm  Jaw/Neck Findings:  Neck  ROM: Normal ROM      Dental:  Dental Findings:    Chest/Lungs:  Chest/Lungs Findings: Normal Respiratory Rate     Heart/Vascular:  Heart Findings: Rate: Normal  Rhythm: Regular Rhythm        Mental Status:  Mental Status Findings:  Cooperative, Alert and Oriented         Anesthesia Plan  Type of Anesthesia, risks & benefits discussed:  Anesthesia Type:  general  Patient's Preference:   Intra-op Monitoring Plan: standard ASA monitors  Intra-op Monitoring Plan Comments:   Post Op Pain Control Plan: per primary service following discharge from PACU, IV/PO Opioids PRN and multimodal analgesia  Post Op Pain Control Plan Comments:   Induction:   IV  Beta Blocker:  Patient is not currently on a Beta-Blocker (No further documentation required).       Informed Consent: Patient understands risks and agrees with Anesthesia plan.  Questions answered. Anesthesia consent signed with patient.  ASA Score: 2     Day of Surgery Review of History & Physical:    H&P update referred to the provider.         Ready For Surgery From Anesthesia Perspective.

## 2019-05-07 NOTE — PLAN OF CARE
Problem: Adult Inpatient Plan of Care  Goal: Plan of Care Review  Pt denies pain, no nausea, tolerates regular diet. 1 BM today. NPO at midnight for surgery

## 2019-05-07 NOTE — SUBJECTIVE & OBJECTIVE
Interval History: No acute events overnight. ERCP done which showed pus and stone removed from CBD. Headache overnight.     Medications:  Continuous Infusions:   sodium chloride 0.9%       Scheduled Meds:   enoxaparin  40 mg Subcutaneous Daily    piperacillin-tazobactam (ZOSYN) IVPB  4.5 g Intravenous Q8H    simvastatin  20 mg Oral QHS    sodium chloride 0.9%  3 mL Intravenous Q8H     PRN Meds:acetaminophen, diphenhydrAMINE, hydrALAZINE, HYDROmorphone, ondansetron, ramelteon     Review of patient's allergies indicates:   Allergen Reactions    Meloxicam Rash     Flushing       Objective:     Vital Signs (Most Recent):  Temp: 97 °F (36.1 °C) (05/07/19 0411)  Pulse: 76 (05/07/19 0411)  Resp: 14 (05/07/19 0411)  BP: 128/60 (05/07/19 0411)  SpO2: 97 % (05/07/19 0411) Vital Signs (24h Range):  Temp:  [97 °F (36.1 °C)-98.4 °F (36.9 °C)] 97 °F (36.1 °C)  Pulse:  [75-85] 76  Resp:  [14-17] 14  SpO2:  [96 %-100 %] 97 %  BP: (122-153)/(60-77) 128/60     Weight: 61 kg (134 lb 7.7 oz)  Body mass index is 23.08 kg/m².    Intake/Output - Last 3 Shifts       05/05 0700 - 05/06 0659 05/06 0700 - 05/07 0659    P.O.  480    I.V. (mL/kg)  3246.3 (53.2)    IV Piggyback  100    Total Intake(mL/kg)  3826.3 (62.7)    Net  +3826.3          Urine Occurrence  8 x    Stool Occurrence  2 x          Physical Exam   Constitutional: She appears well-developed and well-nourished. No distress.   HENT:   Head: Normocephalic and atraumatic.   Eyes: Conjunctivae are normal.   Neck: Normal range of motion.   Cardiovascular: Normal rate.   Pulmonary/Chest: Effort normal.   Abdominal: Soft. There is no tenderness.   Neurological: She is alert.   Skin: Skin is warm and dry.   Psychiatric: She has a normal mood and affect. Her behavior is normal.   Nursing note and vitals reviewed.      Significant Labs:  CBC:   Recent Labs   Lab 05/07/19  0447   WBC 5.09   RBC 3.90*   HGB 12.2   HCT 38.1      MCV 98   MCH 31.3*   MCHC 32.0     CMP:   Recent  Labs   Lab 05/07/19  0447   GLU 98   CALCIUM 8.2*   ALBUMIN 2.8*   PROT 5.4*      K 3.9   CO2 22*   *   BUN 8   CREATININE 0.6   ALKPHOS 251*   *   *   BILITOT 2.6*       Significant Diagnostics:  I have reviewed all pertinent imaging results/findings within the past 24 hours.

## 2019-05-07 NOTE — PT/OT/SLP EVAL
"Physical Therapy Evaluation/D/C Summary    Patient Name:  Torie Richard   MRN:  [id number]    Recommendations:     Discharge Recommendations:  home   Discharge Equipment Recommendations: none   Barriers to discharge: Inaccessible home (pt able to go up/down steps as needed to enter home)    Assessment:     Torie Richard is a 73 y.o. female admitted with a medical diagnosis of Choledocholithiasis with obstruction. Pt is independent with functional mobility including up/down steps. Pt states she will be having surgery tomorrow.    Recent Surgery: Procedure(s) (LRB):  ERCP (ENDOSCOPIC RETROGRADE CHOLANGIOPANCREATOGRAPHY) (N/A) 1 Day Post-Op    Plan:      (D/C PT due to pt independent with functional mobility including up/down steps.)   · Plan of Care Expires:  06/06/19    Subjective   "I am getting up to go to the bathroom"    Pain/Comfort:  · Pain Rating 1: 0/10  · Pain Rating Post-Intervention 1: 0/10    Patients cultural, spiritual, Yazdanism conflicts given the current situation: no    Living Environment:  Pt lives with her daughter in a 1 story home with 5 RADHA with B UE rails  Prior to admission, patients level of function was independent.  Equipment used at home: none. Upon discharge, patient will have assistance from daughter if needed.    Objective:     Communicated with nurse prior to session.  Patient found supine with peripheral IV  upon PT entry to room.    General Precautions: Standard, fall   Orthopedic Precautions:N/A   Braces: N/A     Exams:  · Cognitive Exam:  Patient is oriented to Person, Place, Time and Situation  · Sensation:    · -       Intact  light/touch B LE  · RLE ROM: WFL  · RLE Strength: WFL  · LLE ROM: WFL  · LLE Strength: WFL    Functional Mobility:  · Bed Mobility:     · Supine to Sit: independence  · Transfers:     · Sit to Stand:  independence with no AD  · Gait: 300ft with no AD with independent. pt performed standing balance activities: single leg stance, ambulated backwards, " and high knee gait.  · Stairs:  Pt ascended/descended 5 stair(s) with No Assistive Device with no handrails with Modified Independent.     AM-PAC 6 CLICK MOBILITY  Total Score:24     Patient left up in chair with all lines intact, call button in reach and nurse notified.    GOALS:   Multidisciplinary Problems     Physical Therapy Goals     Not on file          Multidisciplinary Problems (Resolved)        Problem: Physical Therapy Goal    Goal Priority Disciplines Outcome Goal Variances Interventions   Physical Therapy Goal   (Resolved)     PT, PT/OT Outcome(s) achieved                     History:     Past Medical History:   Diagnosis Date    Hyperlipidemia     Hypertension        Past Surgical History:   Procedure Laterality Date     SECTION      ERCP (ENDOSCOPIC RETROGRADE CHOLANGIOPANCREATOGRAPHY) N/A 2019    [facility]    EXCISIONAL HEMORRHOIDECTOMY      TONSILLECTOMY         Time Tracking:     PT Received On: 19  PT Start Time: 821     PT Stop Time: 831  PT Total Time (min): 10 min     Billable Minutes: Evaluation 10    Christie Khan, PT  2019

## 2019-05-07 NOTE — PROGRESS NOTES
[facility]  General Surgery  Progress Note    Subjective:     History of Present Illness:  No notes on file    Post-Op Info:  Procedure(s) (LRB):  ERCP (ENDOSCOPIC RETROGRADE CHOLANGIOPANCREATOGRAPHY) (N/A)   1 Day Post-Op     Interval History: No acute events overnight. ERCP done which showed pus and stone removed from CBD. Headache overnight.     Medications:  Continuous Infusions:   sodium chloride 0.9%       Scheduled Meds:   enoxaparin  40 mg Subcutaneous Daily    piperacillin-tazobactam (ZOSYN) IVPB  4.5 g Intravenous Q8H    simvastatin  20 mg Oral QHS    sodium chloride 0.9%  3 mL Intravenous Q8H     PRN Meds:acetaminophen, diphenhydrAMINE, hydrALAZINE, HYDROmorphone, ondansetron, ramelteon     Review of patient's allergies indicates:   Allergen Reactions    Meloxicam Rash     Flushing       Objective:     Vital Signs (Most Recent):  Temp: 97 °F (36.1 °C) (05/07/19 0411)  Pulse: 76 (05/07/19 0411)  Resp: 14 (05/07/19 0411)  BP: 128/60 (05/07/19 0411)  SpO2: 97 % (05/07/19 0411) Vital Signs (24h Range):  Temp:  [97 °F (36.1 °C)-98.4 °F (36.9 °C)] 97 °F (36.1 °C)  Pulse:  [75-85] 76  Resp:  [14-17] 14  SpO2:  [96 %-100 %] 97 %  BP: (122-153)/(60-77) 128/60     Weight: 61 kg (134 lb 7.7 oz)  Body mass index is 23.08 kg/m².    Intake/Output - Last 3 Shifts       05/05 0700 - 05/06 0659 05/06 0700 - 05/07 0659    P.O.  480    I.V. (mL/kg)  3246.3 (53.2)    IV Piggyback  100    Total Intake(mL/kg)  3826.3 (62.7)    Net  +3826.3          Urine Occurrence  8 x    Stool Occurrence  2 x          Physical Exam   Constitutional: She appears well-developed and well-nourished. No distress.   HENT:   Head: Normocephalic and atraumatic.   Eyes: Conjunctivae are normal.   Neck: Normal range of motion.   Cardiovascular: Normal rate.   Pulmonary/Chest: Effort normal.   Abdominal: Soft. There is no tenderness.   Neurological: She is alert.   Skin: Skin is warm and dry.   Psychiatric: She has a normal  mood and affect. Her behavior is normal.   Nursing note and vitals reviewed.      Significant Labs:  CBC:   Recent Labs   Lab 05/07/19  0447   WBC 5.09   RBC 3.90*   HGB 12.2   HCT 38.1      MCV 98   MCH 31.3*   MCHC 32.0     CMP:   Recent Labs   Lab 05/07/19  0447   GLU 98   CALCIUM 8.2*   ALBUMIN 2.8*   PROT 5.4*      K 3.9   CO2 22*   *   BUN 8   CREATININE 0.6   ALKPHOS 251*   *   *   BILITOT 2.6*       Significant Diagnostics:  I have reviewed all pertinent imaging results/findings within the past 24 hours.    Assessment/Plan:     Epigastric abdominal pain  73-year-old female presented with abdominal pain found to have evidence of choledocholithiasis without cholecystitis. Now s/p ERCP with stone removal. Pus encountered. Started zosyn. T bili downtrending.     -advance to regular diet  -continue zosyn for pus in CBD.   -plan for lap bharat tomorrow, consent obtained  -NPO midnight  -d/c mIVF  -tylenol for headaches.   -replace lytes  -home meds  -GI and DVT ppx        Ethan Piedra MD  General Surgery  [facility]

## 2019-05-07 NOTE — PLAN OF CARE
Problem: Occupational Therapy Goal  Goal: Occupational Therapy Goal  Outcome: Outcome(s) achieved Date Met: 05/07/19  Eval completed, no OT needs    Comments: Continue OT POC     Keisha Maria OT  5/7/2019

## 2019-05-07 NOTE — PLAN OF CARE
Problem: Physical Therapy Goal  Goal: Physical Therapy Goal  Outcome: Outcome(s) achieved Date Met: 05/07/19  PT D/Thanh due to pt independent with functional mobility including up/down steps. Christie Khan PT 5/7/19

## 2019-05-08 ENCOUNTER — ANESTHESIA (OUTPATIENT)
Dept: SURGERY | Facility: HOSPITAL | Age: 74
DRG: 418 | End: 2019-05-08
Payer: MEDICARE

## 2019-05-08 LAB
ALBUMIN SERPL BCP-MCNC: 2.9 G/DL (ref 3.5–5.2)
ALP SERPL-CCNC: 261 U/L (ref 55–135)
ALT SERPL W/O P-5'-P-CCNC: 315 U/L (ref 10–44)
ANION GAP SERPL CALC-SCNC: 8 MMOL/L (ref 8–16)
AST SERPL-CCNC: 72 U/L (ref 10–40)
BASOPHILS # BLD AUTO: 0.04 K/UL (ref 0–0.2)
BASOPHILS NFR BLD: 0.6 % (ref 0–1.9)
BILIRUB SERPL-MCNC: 1.5 MG/DL (ref 0.1–1)
BUN SERPL-MCNC: 9 MG/DL (ref 8–23)
CALCIUM SERPL-MCNC: 8.6 MG/DL (ref 8.7–10.5)
CHLORIDE SERPL-SCNC: 112 MMOL/L (ref 95–110)
CO2 SERPL-SCNC: 20 MMOL/L (ref 23–29)
CREAT SERPL-MCNC: 0.6 MG/DL (ref 0.5–1.4)
DIFFERENTIAL METHOD: ABNORMAL
EOSINOPHIL # BLD AUTO: 0.3 K/UL (ref 0–0.5)
EOSINOPHIL NFR BLD: 4 % (ref 0–8)
ERYTHROCYTE [DISTWIDTH] IN BLOOD BY AUTOMATED COUNT: 12.5 % (ref 11.5–14.5)
EST. GFR  (AFRICAN AMERICAN): >60 ML/MIN/1.73 M^2
EST. GFR  (NON AFRICAN AMERICAN): >60 ML/MIN/1.73 M^2
GLUCOSE SERPL-MCNC: 87 MG/DL (ref 70–110)
HCT VFR BLD AUTO: 36.3 % (ref 37–48.5)
HGB BLD-MCNC: 11.8 G/DL (ref 12–16)
IMM GRANULOCYTES # BLD AUTO: 0.01 K/UL (ref 0–0.04)
IMM GRANULOCYTES NFR BLD AUTO: 0.2 % (ref 0–0.5)
LYMPHOCYTES # BLD AUTO: 1.7 K/UL (ref 1–4.8)
LYMPHOCYTES NFR BLD: 26.8 % (ref 18–48)
MAGNESIUM SERPL-MCNC: 1.6 MG/DL (ref 1.6–2.6)
MCH RBC QN AUTO: 31 PG (ref 27–31)
MCHC RBC AUTO-ENTMCNC: 32.5 G/DL (ref 32–36)
MCV RBC AUTO: 95 FL (ref 82–98)
MONOCYTES # BLD AUTO: 0.6 K/UL (ref 0.3–1)
MONOCYTES NFR BLD: 8.9 % (ref 4–15)
NEUTROPHILS # BLD AUTO: 3.7 K/UL (ref 1.8–7.7)
NEUTROPHILS NFR BLD: 59.5 % (ref 38–73)
NRBC BLD-RTO: 0 /100 WBC
PHOSPHATE SERPL-MCNC: 2.6 MG/DL (ref 2.7–4.5)
PLATELET # BLD AUTO: 238 K/UL (ref 150–350)
PMV BLD AUTO: 10.3 FL (ref 9.2–12.9)
POTASSIUM SERPL-SCNC: 3.6 MMOL/L (ref 3.5–5.1)
PROT SERPL-MCNC: 5.7 G/DL (ref 6–8.4)
RBC # BLD AUTO: 3.81 M/UL (ref 4–5.4)
SODIUM SERPL-SCNC: 140 MMOL/L (ref 136–145)
WBC # BLD AUTO: 6.26 K/UL (ref 3.9–12.7)

## 2019-05-08 PROCEDURE — 83735 ASSAY OF MAGNESIUM: CPT

## 2019-05-08 PROCEDURE — 25000003 PHARM REV CODE 250: Performed by: NURSE ANESTHETIST, CERTIFIED REGISTERED

## 2019-05-08 PROCEDURE — 71000039 HC RECOVERY, EACH ADD'L HOUR: Performed by: SURGERY

## 2019-05-08 PROCEDURE — D9220A PRA ANESTHESIA: Mod: ANES,,, | Performed by: ANESTHESIOLOGY

## 2019-05-08 PROCEDURE — 80053 COMPREHEN METABOLIC PANEL: CPT

## 2019-05-08 PROCEDURE — 20600001 HC STEP DOWN PRIVATE ROOM

## 2019-05-08 PROCEDURE — S0020 INJECTION, BUPIVICAINE HYDRO: HCPCS | Performed by: SURGERY

## 2019-05-08 PROCEDURE — 27201423 OPTIME MED/SURG SUP & DEVICES STERILE SUPPLY: Performed by: SURGERY

## 2019-05-08 PROCEDURE — A4216 STERILE WATER/SALINE, 10 ML: HCPCS | Performed by: STUDENT IN AN ORGANIZED HEALTH CARE EDUCATION/TRAINING PROGRAM

## 2019-05-08 PROCEDURE — 25000003 PHARM REV CODE 250: Performed by: STUDENT IN AN ORGANIZED HEALTH CARE EDUCATION/TRAINING PROGRAM

## 2019-05-08 PROCEDURE — 71000033 HC RECOVERY, INTIAL HOUR: Performed by: SURGERY

## 2019-05-08 PROCEDURE — C9113 INJ PANTOPRAZOLE SODIUM, VIA: HCPCS | Performed by: STUDENT IN AN ORGANIZED HEALTH CARE EDUCATION/TRAINING PROGRAM

## 2019-05-08 PROCEDURE — 37000008 HC ANESTHESIA 1ST 15 MINUTES: Performed by: SURGERY

## 2019-05-08 PROCEDURE — 25000003 PHARM REV CODE 250: Performed by: INTERNAL MEDICINE

## 2019-05-08 PROCEDURE — 36000708 HC OR TIME LEV III 1ST 15 MIN: Performed by: SURGERY

## 2019-05-08 PROCEDURE — 63600175 PHARM REV CODE 636 W HCPCS: Performed by: STUDENT IN AN ORGANIZED HEALTH CARE EDUCATION/TRAINING PROGRAM

## 2019-05-08 PROCEDURE — 88304 TISSUE SPECIMEN TO PATHOLOGY - SURGERY: ICD-10-PCS | Mod: 26,,, | Performed by: PATHOLOGY

## 2019-05-08 PROCEDURE — 37000009 HC ANESTHESIA EA ADD 15 MINS: Performed by: SURGERY

## 2019-05-08 PROCEDURE — 63600175 PHARM REV CODE 636 W HCPCS: Performed by: INTERNAL MEDICINE

## 2019-05-08 PROCEDURE — 36415 COLL VENOUS BLD VENIPUNCTURE: CPT

## 2019-05-08 PROCEDURE — 63600175 PHARM REV CODE 636 W HCPCS: Performed by: NURSE ANESTHETIST, CERTIFIED REGISTERED

## 2019-05-08 PROCEDURE — 63600175 PHARM REV CODE 636 W HCPCS: Performed by: ANESTHESIOLOGY

## 2019-05-08 PROCEDURE — 99232 PR SUBSEQUENT HOSPITAL CARE,LEVL II: ICD-10-PCS | Mod: 57,GC,, | Performed by: SURGERY

## 2019-05-08 PROCEDURE — 88304 TISSUE EXAM BY PATHOLOGIST: CPT | Performed by: PATHOLOGY

## 2019-05-08 PROCEDURE — D9220A PRA ANESTHESIA: ICD-10-PCS | Mod: ANES,,, | Performed by: ANESTHESIOLOGY

## 2019-05-08 PROCEDURE — 25000003 PHARM REV CODE 250: Performed by: SURGERY

## 2019-05-08 PROCEDURE — 84100 ASSAY OF PHOSPHORUS: CPT

## 2019-05-08 PROCEDURE — 47562 PR LAP,CHOLECYSTECTOMY: ICD-10-PCS | Mod: ,,, | Performed by: SURGERY

## 2019-05-08 PROCEDURE — 36000709 HC OR TIME LEV III EA ADD 15 MIN: Performed by: SURGERY

## 2019-05-08 PROCEDURE — 47562 LAPAROSCOPIC CHOLECYSTECTOMY: CPT | Mod: ,,, | Performed by: SURGERY

## 2019-05-08 PROCEDURE — A4216 STERILE WATER/SALINE, 10 ML: HCPCS | Performed by: INTERNAL MEDICINE

## 2019-05-08 PROCEDURE — 85025 COMPLETE CBC W/AUTO DIFF WBC: CPT

## 2019-05-08 PROCEDURE — 99232 SBSQ HOSP IP/OBS MODERATE 35: CPT | Mod: 57,GC,, | Performed by: SURGERY

## 2019-05-08 PROCEDURE — D9220A PRA ANESTHESIA: ICD-10-PCS | Mod: CRNA,,, | Performed by: NURSE ANESTHETIST, CERTIFIED REGISTERED

## 2019-05-08 PROCEDURE — 94761 N-INVAS EAR/PLS OXIMETRY MLT: CPT

## 2019-05-08 PROCEDURE — D9220A PRA ANESTHESIA: Mod: CRNA,,, | Performed by: NURSE ANESTHETIST, CERTIFIED REGISTERED

## 2019-05-08 RX ORDER — MIDAZOLAM HYDROCHLORIDE 1 MG/ML
INJECTION INTRAMUSCULAR; INTRAVENOUS
Status: DISCONTINUED | OUTPATIENT
Start: 2019-05-08 | End: 2019-05-08

## 2019-05-08 RX ORDER — HYDROCODONE BITARTRATE AND ACETAMINOPHEN 5; 325 MG/1; MG/1
1 TABLET ORAL EVERY 4 HOURS PRN
Status: DISCONTINUED | OUTPATIENT
Start: 2019-05-08 | End: 2019-05-10

## 2019-05-08 RX ORDER — PANTOPRAZOLE SODIUM 40 MG/10ML
40 INJECTION, POWDER, LYOPHILIZED, FOR SOLUTION INTRAVENOUS DAILY
Status: DISCONTINUED | OUTPATIENT
Start: 2019-05-08 | End: 2019-05-11 | Stop reason: HOSPADM

## 2019-05-08 RX ORDER — ACETAMINOPHEN 10 MG/ML
INJECTION, SOLUTION INTRAVENOUS
Status: DISCONTINUED | OUTPATIENT
Start: 2019-05-08 | End: 2019-05-08

## 2019-05-08 RX ORDER — FENTANYL CITRATE 50 UG/ML
INJECTION, SOLUTION INTRAMUSCULAR; INTRAVENOUS
Status: DISCONTINUED | OUTPATIENT
Start: 2019-05-08 | End: 2019-05-08

## 2019-05-08 RX ORDER — LIDOCAINE HCL/PF 100 MG/5ML
SYRINGE (ML) INTRAVENOUS
Status: DISCONTINUED | OUTPATIENT
Start: 2019-05-08 | End: 2019-05-08

## 2019-05-08 RX ORDER — SUCCINYLCHOLINE CHLORIDE 20 MG/ML
INJECTION INTRAMUSCULAR; INTRAVENOUS
Status: DISCONTINUED | OUTPATIENT
Start: 2019-05-08 | End: 2019-05-08

## 2019-05-08 RX ORDER — NEOSTIGMINE METHYLSULFATE 1 MG/ML
INJECTION, SOLUTION INTRAVENOUS
Status: DISCONTINUED | OUTPATIENT
Start: 2019-05-08 | End: 2019-05-08

## 2019-05-08 RX ORDER — OXYCODONE AND ACETAMINOPHEN 5; 325 MG/1; MG/1
1 TABLET ORAL
Status: DISCONTINUED | OUTPATIENT
Start: 2019-05-08 | End: 2019-05-08 | Stop reason: HOSPADM

## 2019-05-08 RX ORDER — FENTANYL CITRATE 50 UG/ML
25 INJECTION, SOLUTION INTRAMUSCULAR; INTRAVENOUS EVERY 5 MIN PRN
Status: DISCONTINUED | OUTPATIENT
Start: 2019-05-08 | End: 2019-05-08 | Stop reason: HOSPADM

## 2019-05-08 RX ORDER — ONDANSETRON HYDROCHLORIDE 2 MG/ML
INJECTION, SOLUTION INTRAMUSCULAR; INTRAVENOUS
Status: DISCONTINUED | OUTPATIENT
Start: 2019-05-08 | End: 2019-05-08

## 2019-05-08 RX ORDER — METOCLOPRAMIDE HYDROCHLORIDE 5 MG/ML
10 INJECTION INTRAMUSCULAR; INTRAVENOUS EVERY 10 MIN PRN
Status: DISCONTINUED | OUTPATIENT
Start: 2019-05-08 | End: 2019-05-08 | Stop reason: HOSPADM

## 2019-05-08 RX ORDER — HYDROMORPHONE HYDROCHLORIDE 1 MG/ML
0.5 INJECTION, SOLUTION INTRAMUSCULAR; INTRAVENOUS; SUBCUTANEOUS
Status: DISCONTINUED | OUTPATIENT
Start: 2019-05-08 | End: 2019-05-11 | Stop reason: HOSPADM

## 2019-05-08 RX ORDER — OXYCODONE HYDROCHLORIDE 5 MG/1
5 TABLET ORAL EVERY 4 HOURS PRN
Status: DISCONTINUED | OUTPATIENT
Start: 2019-05-08 | End: 2019-05-11 | Stop reason: HOSPADM

## 2019-05-08 RX ORDER — ROCURONIUM BROMIDE 10 MG/ML
INJECTION, SOLUTION INTRAVENOUS
Status: DISCONTINUED | OUTPATIENT
Start: 2019-05-08 | End: 2019-05-08

## 2019-05-08 RX ORDER — GLYCOPYRROLATE 0.2 MG/ML
INJECTION INTRAMUSCULAR; INTRAVENOUS
Status: DISCONTINUED | OUTPATIENT
Start: 2019-05-08 | End: 2019-05-08

## 2019-05-08 RX ORDER — BUPIVACAINE HYDROCHLORIDE 5 MG/ML
INJECTION, SOLUTION EPIDURAL; INTRACAUDAL
Status: DISCONTINUED | OUTPATIENT
Start: 2019-05-08 | End: 2019-05-08 | Stop reason: HOSPADM

## 2019-05-08 RX ORDER — DEXAMETHASONE SODIUM PHOSPHATE 4 MG/ML
INJECTION, SOLUTION INTRA-ARTICULAR; INTRALESIONAL; INTRAMUSCULAR; INTRAVENOUS; SOFT TISSUE
Status: DISCONTINUED | OUTPATIENT
Start: 2019-05-08 | End: 2019-05-08

## 2019-05-08 RX ORDER — PHENYLEPHRINE HYDROCHLORIDE 10 MG/ML
INJECTION INTRAVENOUS
Status: DISCONTINUED | OUTPATIENT
Start: 2019-05-08 | End: 2019-05-08

## 2019-05-08 RX ORDER — SODIUM CHLORIDE 9 MG/ML
INJECTION, SOLUTION INTRAVENOUS CONTINUOUS PRN
Status: DISCONTINUED | OUTPATIENT
Start: 2019-05-08 | End: 2019-05-08

## 2019-05-08 RX ORDER — ACETAMINOPHEN 10 MG/ML
1000 INJECTION, SOLUTION INTRAVENOUS ONCE
Status: COMPLETED | OUTPATIENT
Start: 2019-05-08 | End: 2019-05-08

## 2019-05-08 RX ORDER — KETAMINE HYDROCHLORIDE 10 MG/ML
INJECTION, SOLUTION INTRAMUSCULAR; INTRAVENOUS
Status: DISCONTINUED | OUTPATIENT
Start: 2019-05-08 | End: 2019-05-08

## 2019-05-08 RX ORDER — PROPOFOL 10 MG/ML
VIAL (ML) INTRAVENOUS
Status: DISCONTINUED | OUTPATIENT
Start: 2019-05-08 | End: 2019-05-08

## 2019-05-08 RX ORDER — OXYCODONE HYDROCHLORIDE 10 MG/1
10 TABLET ORAL EVERY 4 HOURS PRN
Status: DISCONTINUED | OUTPATIENT
Start: 2019-05-08 | End: 2019-05-11 | Stop reason: HOSPADM

## 2019-05-08 RX ADMIN — LIDOCAINE HYDROCHLORIDE 100 MG: 20 INJECTION, SOLUTION INTRAVENOUS at 01:05

## 2019-05-08 RX ADMIN — PIPERACILLIN AND TAZOBACTAM 4.5 G: 4; .5 INJECTION, POWDER, LYOPHILIZED, FOR SOLUTION INTRAVENOUS; PARENTERAL at 08:05

## 2019-05-08 RX ADMIN — FENTANYL CITRATE 25 MCG: 50 INJECTION INTRAMUSCULAR; INTRAVENOUS at 05:05

## 2019-05-08 RX ADMIN — GLYCOPYRROLATE 0.4 MG: 0.2 INJECTION, SOLUTION INTRAMUSCULAR; INTRAVENOUS at 04:05

## 2019-05-08 RX ADMIN — SIMVASTATIN 20 MG: 20 TABLET, FILM COATED ORAL at 09:05

## 2019-05-08 RX ADMIN — SODIUM GLYCOLATE 40 MMOL: 216 INJECTION, SOLUTION INTRAVENOUS at 11:05

## 2019-05-08 RX ADMIN — MAGNESIUM SULFATE HEPTAHYDRATE 3 G: 500 INJECTION, SOLUTION INTRAMUSCULAR; INTRAVENOUS at 01:05

## 2019-05-08 RX ADMIN — MIDAZOLAM HYDROCHLORIDE 2 MG: 1 INJECTION, SOLUTION INTRAMUSCULAR; INTRAVENOUS at 01:05

## 2019-05-08 RX ADMIN — ROCURONIUM BROMIDE 5 MG: 10 INJECTION, SOLUTION INTRAVENOUS at 01:05

## 2019-05-08 RX ADMIN — SODIUM CHLORIDE: 0.9 INJECTION, SOLUTION INTRAVENOUS at 12:05

## 2019-05-08 RX ADMIN — Medication 3 ML: at 06:05

## 2019-05-08 RX ADMIN — FENTANYL CITRATE 25 MCG: 50 INJECTION, SOLUTION INTRAMUSCULAR; INTRAVENOUS at 04:05

## 2019-05-08 RX ADMIN — FENTANYL CITRATE 50 MCG: 50 INJECTION, SOLUTION INTRAMUSCULAR; INTRAVENOUS at 02:05

## 2019-05-08 RX ADMIN — NEOSTIGMINE METHYLSULFATE 4 MG: 1 INJECTION INTRAVENOUS at 04:05

## 2019-05-08 RX ADMIN — KETAMINE HYDROCHLORIDE 10 MG: 10 INJECTION, SOLUTION INTRAMUSCULAR; INTRAVENOUS at 02:05

## 2019-05-08 RX ADMIN — SODIUM CHLORIDE: 0.9 INJECTION, SOLUTION INTRAVENOUS at 01:05

## 2019-05-08 RX ADMIN — OXYCODONE HYDROCHLORIDE 5 MG: 5 TABLET ORAL at 08:05

## 2019-05-08 RX ADMIN — PHENYLEPHRINE HYDROCHLORIDE 200 MCG: 10 INJECTION INTRAVENOUS at 02:05

## 2019-05-08 RX ADMIN — ONDANSETRON 4 MG: 2 INJECTION, SOLUTION INTRAMUSCULAR; INTRAVENOUS at 02:05

## 2019-05-08 RX ADMIN — HYDROCODONE BITARTRATE AND ACETAMINOPHEN 1 TABLET: 5; 325 TABLET ORAL at 04:05

## 2019-05-08 RX ADMIN — ENOXAPARIN SODIUM 40 MG: 100 INJECTION SUBCUTANEOUS at 05:05

## 2019-05-08 RX ADMIN — MAGNESIUM SULFATE HEPTAHYDRATE 3 G: 500 INJECTION, SOLUTION INTRAMUSCULAR; INTRAVENOUS at 11:05

## 2019-05-08 RX ADMIN — PANTOPRAZOLE SODIUM 40 MG: 40 INJECTION, POWDER, LYOPHILIZED, FOR SOLUTION INTRAVENOUS at 08:05

## 2019-05-08 RX ADMIN — FENTANYL CITRATE 25 MCG: 50 INJECTION INTRAMUSCULAR; INTRAVENOUS at 04:05

## 2019-05-08 RX ADMIN — ACETAMINOPHEN 1000 MG: 10 INJECTION, SOLUTION INTRAVENOUS at 09:05

## 2019-05-08 RX ADMIN — SODIUM CHLORIDE, SODIUM GLUCONATE, SODIUM ACETATE, POTASSIUM CHLORIDE, MAGNESIUM CHLORIDE, SODIUM PHOSPHATE, DIBASIC, AND POTASSIUM PHOSPHATE: .53; .5; .37; .037; .03; .012; .00082 INJECTION, SOLUTION INTRAVENOUS at 03:05

## 2019-05-08 RX ADMIN — ONDANSETRON 4 MG: 2 INJECTION INTRAMUSCULAR; INTRAVENOUS at 02:05

## 2019-05-08 RX ADMIN — ROCURONIUM BROMIDE 15 MG: 10 INJECTION, SOLUTION INTRAVENOUS at 02:05

## 2019-05-08 RX ADMIN — PROPOFOL 150 MG: 10 INJECTION, EMULSION INTRAVENOUS at 01:05

## 2019-05-08 RX ADMIN — SODIUM GLYCOLATE 40 MMOL: 216 INJECTION, SOLUTION INTRAVENOUS at 01:05

## 2019-05-08 RX ADMIN — SUCCINYLCHOLINE CHLORIDE 160 MG: 20 INJECTION, SOLUTION INTRAMUSCULAR; INTRAVENOUS at 01:05

## 2019-05-08 RX ADMIN — ACETAMINOPHEN 1000 MG: 10 INJECTION, SOLUTION INTRAVENOUS at 02:05

## 2019-05-08 RX ADMIN — PIPERACILLIN AND TAZOBACTAM 4.5 G: 4; .5 INJECTION, POWDER, LYOPHILIZED, FOR SOLUTION INTRAVENOUS; PARENTERAL at 04:05

## 2019-05-08 RX ADMIN — PIPERACILLIN AND TAZOBACTAM 4.5 G: 4; .5 INJECTION, POWDER, LYOPHILIZED, FOR SOLUTION INTRAVENOUS; PARENTERAL at 12:05

## 2019-05-08 RX ADMIN — DEXAMETHASONE SODIUM PHOSPHATE 4 MG: 4 INJECTION, SOLUTION INTRAMUSCULAR; INTRAVENOUS at 02:05

## 2019-05-08 RX ADMIN — Medication 3 ML: at 10:05

## 2019-05-08 NOTE — OP NOTE
DATE OF PROCEDURE: 05/08/2019     PREOPERATIVE DIAGNOSIS: Choledocholithiasis.     POSTOPERATIVE DIAGNOSIS: Choledocholithiasis    PROCEDURE PERFORMED: Laparoscopic cholecystectomy.     ATTENDING SURGEON: Tayo Guadarrama M.D.     HOUSESTAFF SURGEON: Tan Ya M.D. (RES)     : Checo Reyes M.D. (RES)     ANESTHESIA: General endotracheal.     ESTIMATED BLOOD LOSS: 50 mL.     FINDINGS: Cholelithiasis and moderate inflammation.     SPECIMEN: Gallbladder.     DRAINS: None.     COMPLICATIONS: None.     INDICATIONS: Torie Richard is a 73 y.o. female who was admitted to the hospital after she was found to have epigastric abdominal pain. The history and exam were consistent with biliary disease, which was confirmed by laboratory studies and ultrasound. On her ultrasound, she was noted to have choledocholithiasis and an ERCP was performed. A stone was removed, sphincterotomy performed and stent placed. We recommended laparoscopic cholecystectomy and the patient agreed to proceed. The patient signed informed consent and expressed understanding of the risks and benefits of surgery.     OPERATIVE PROCEDURE: The patient was identified in Preoperative Holding and  brought back to the Operating Room. Placed supine on the operating table and padded appropriately. Monitors were applied and there was smooth induction of general endotracheal anesthesia. The patient's abdomen was prepped and draped  in the standard sterile surgical fashion. A time-out was performed and all team   members present agreed this was the correct procedure on the correct patient.   We also confirmed administration of appropriate preoperative antibiotics.    A 2-cm infraumbilical skin incision was made. Subcutaneous tissue was bluntly dissected. The umbilical stalk was grasped with penetrating towel clip and elevated and a 1.5-cm midline infraumbilical fascial incision was made. The abdomen was bluntly entered under direct  vision. A 0 Vicryl stay suture was placed around the fascial incision in a horizontal mattress fashion. A balloon trocar was placed and the abdomen was insufflated with carbon dioxide to a maximum pressure of 15 mmHg. A 10-mm laparoscope was placed and the abdomen was examined. There was no evidence of injury from the initial trocar placement. Three 5-mm trocars were placed under direct vision through separate stab incisions, one subxiphoid and two in the right upper quadrant. We directed our attention to the right upper quadrant. The gallbladder was   identified and noted to have significant inflammatory change. The fundus was   grasped and retracted cranially and the infundibulum was grasped and retracted   laterally. We bluntly dissected the peritoneal reflection off the infundibulum   and neck of the gallbladder. With careful blunt dissection in this area, we   were able to identify the cystic duct which was noted to be mildly dilated. Further careful dissection identified the cystic artery and we did obtain a critical view of safety. However, there as a small injury to the cystic artery distal to where it entered the gallbladder, so we triply clipped it and divided it. Once we were satisfied with our hemostasis, we clipped the duct and divided it.The gallbladder was dissected off the gallbladder fossa using Bovie electrocautery from infundibulum to fundus until   free. It was placed into an EndoCatch bag and removed from the umbilical port   site with no difficulty. We returned the laparoscope and Clinton trocar to the umbilicus and reexamined the right upper quadrant. The gallbladder fossa was examined and no further bleeding or any bile leak were noted. The clips on the cystic duct and artery were examined and no bleeding or bile leak were noted. The right upper quadrant was irrigated   with saline until the returning effluent was clear. All ports were   removed under direct vision and no bleeding from any  port site was noted. The   insufflation of the abdomen was evacuated and the laparoscope and Clinton trocar   were removed. The fascial incision at the umbilical port site was closed with the   preexisting 0 Vicryl stitch. An additional stitch was placed at a defect at the umbilical port site using another 0-Vicryl suture. All port sites were infiltrated with Marcaine and   closed in a subcuticular fashion. Sterile dressings were applied. The patient   was extubated in the Operating Room and transported to the Recovery Room in   stable condition. All sponge, instrument and needle counts were correct at the   end of the case. Dr. Guadarrama was present and scrubbed for the entire procedure.

## 2019-05-08 NOTE — PLAN OF CARE
Problem: Adult Inpatient Plan of Care  Goal: Plan of Care Review  Outcome: Ongoing (interventions implemented as appropriate)  POC reviewed with pt. Pt verbalized understanding.Pt is AAOX'4. VSS. Pt is on room air.Pt denies pain or nausea.Pt is NPO for surgery 5/8/19. No acute events. Bed in lowest position with call light within reach. TM.

## 2019-05-08 NOTE — SUBJECTIVE & OBJECTIVE
Interval History: No acute events overnight. NPO for surgery.     Medications:  Continuous Infusions:   sodium chloride 0.9% 100 mL/hr at 05/08/19 0000     Scheduled Meds:   enoxaparin  40 mg Subcutaneous Daily    pantoprazole  40 mg Intravenous Daily    piperacillin-tazobactam (ZOSYN) IVPB  4.5 g Intravenous Q8H    simvastatin  20 mg Oral QHS    sodium chloride 0.9%  3 mL Intravenous Q8H     PRN Meds:acetaminophen, diphenhydrAMINE, hydrALAZINE, HYDROmorphone, ondansetron, ramelteon     Review of patient's allergies indicates:   Allergen Reactions    Meloxicam Rash     Flushing       Objective:     Vital Signs (Most Recent):  Temp: 98.1 °F (36.7 °C) (05/08/19 0428)  Pulse: 78 (05/08/19 0428)  Resp: 18 (05/08/19 0428)  BP: (!) 182/81 (05/08/19 0428)  SpO2: 97 % (05/08/19 0428) Vital Signs (24h Range):  Temp:  [97.5 °F (36.4 °C)-98.4 °F (36.9 °C)] 98.1 °F (36.7 °C)  Pulse:  [70-78] 78  Resp:  [16-20] 18  SpO2:  [96 %-98 %] 97 %  BP: (150-182)/(72-81) 182/81     Weight: 61 kg (134 lb 7.7 oz)  Body mass index is 23.08 kg/m².    Intake/Output - Last 3 Shifts       05/06 0700 - 05/07 0659 05/07 0700 - 05/08 0659    P.O. 480     I.V. (mL/kg) 3246.3 (53.2) 403.3 (6.6)    IV Piggyback 200 550    Total Intake(mL/kg) 3926.3 (64.4) 953.3 (15.6)    Net +3926.3 +953.3          Urine Occurrence 9 x 7 x    Stool Occurrence 3 x 1 x          Physical Exam   Constitutional: She appears well-developed and well-nourished. No distress.   HENT:   Head: Normocephalic and atraumatic.   Eyes: Conjunctivae are normal.   Neck: Normal range of motion.   Cardiovascular: Normal rate.   Pulmonary/Chest: Effort normal.   Abdominal: Soft. There is no tenderness.   Neurological: She is alert.   Skin: Skin is warm and dry.   Psychiatric: She has a normal mood and affect. Her behavior is normal.   Nursing note and vitals reviewed.      Significant Labs:  CBC:   Recent Labs   Lab 05/08/19  0445   WBC 6.26   RBC 3.81*   HGB 11.8*   HCT 36.3*   PLT  238   MCV 95   MCH 31.0   MCHC 32.5     CMP:   Recent Labs   Lab 05/08/19  0445   GLU 87   CALCIUM 8.6*   ALBUMIN 2.9*   PROT 5.7*      K 3.6   CO2 20*   *   BUN 9   CREATININE 0.6   ALKPHOS 261*   *   AST 72*   BILITOT 1.5*       Significant Diagnostics:  I have reviewed all pertinent imaging results/findings within the past 24 hours.

## 2019-05-08 NOTE — NURSING TRANSFER
Nursing Transfer Note      5/8/2019     Transfer to room 1034     Transfer via stretcher    Transfer with     Transported by PACU PCT    Medicines sent:     Chart send with patient: Yes    Notified: son

## 2019-05-08 NOTE — BRIEF OP NOTE
[facility]  Brief Operative Note    SUMMARY     Surgery Date: 5/8/2019     Surgeon(s) and Role:     * Tayo Guadarrama MD - Primary     * Checo Reyes MD - Resident - Assisting     * Tan Ya MD - Resident - Assisting    Pre-op Diagnosis:  Biliary obstruction [K83.1]    Post-op Diagnosis:  Post-Op Diagnosis Codes:     * Biliary obstruction [K83.1]    Procedure(s) (LRB):  CHOLECYSTECTOMY, LAPAROSCOPIC (N/A)    Anesthesia: General    Description of Procedure: Laparoscopic cholecystectomy    Description of the findings of the procedure: moderate inflammation of the gallbladder; cholecystectomy performed after critical view obtained    Estimated Blood Loss: 50 mL         Specimens:   Specimen (12h ago, onward)    Start     Ordered    05/08/19 1555  Specimen to Pathology - Surgery  Once     Comments:  Pre-op diagnosis: Biliary colic, Choledocholithiases1.  Gallbladder (perm)     Start Status     05/08/19 1554 Collected (05/08/19 5966) Order ID: [id number]       05/08/19 1554

## 2019-05-08 NOTE — PLAN OF CARE
Problem: Adult Inpatient Plan of Care  Goal: Plan of Care Review  Outcome: Ongoing (interventions implemented as appropriate)  Patient is alert and oriented. Able to make needs known to staff. Patient had no c/o pain or discomfort prior to leaving the floor for surgery this afternoon. No s/s of respiratory/cardiac distress noted. Patient had a Laparascopic cholectomy performed today by Dr. Guadarrama. Patient's daughter arrived to the floor to see patient after surgery around 1700. Patient was still in recovery and had not been transferred back to the floor yet. Writer escorted family member to PACU to see the patient, who was doing well and had no complications during surgery. Patient is being transferred back to her room now. No issues or concerns at this time. Continue to monitor.

## 2019-05-08 NOTE — PLAN OF CARE
Problem: Adult Inpatient Plan of Care  Goal: Plan of Care Review  Outcome: Ongoing (interventions implemented as appropriate)  POC reviewed with pt. Pt verbalized understanding.Pt is AAOX'4. VSS.Pt denies pain or nausea. Pt on clear liquid diet.Bed in lowest position with call light within reach. No acute events to report. WCTM

## 2019-05-08 NOTE — ASSESSMENT & PLAN NOTE
73-year-old female presented with abdominal pain found to have evidence of choledocholithiasis without cholecystitis. Now s/p ERCP with stone removal. Pus encountered. Started zosyn. T bili downtrending.     -OR for surgery, plan to advance diet after surgery  -continue zosyn for pus in CBD.   -mIVF until tolerating diet.   -tylenol for headaches.   -replace lytes  -home meds  -GI and DVT ppx

## 2019-05-08 NOTE — TRANSFER OF CARE
"Anesthesia Transfer of Care Note    Patient: Torie Richard    Procedure(s) Performed: Procedure(s) (LRB):  CHOLECYSTECTOMY, LAPAROSCOPIC (N/A)    Patient location: PACU    Anesthesia Type: general    Transport from OR: Transported from OR on room air with adequate spontaneous ventilation    Post pain: adequate analgesia    Post assessment: no apparent anesthetic complications and tolerated procedure well    Post vital signs: stable    Level of consciousness: sedated    Nausea/Vomiting: no nausea/vomiting    Complications: none          Last vitals:   Visit Vitals  BP (!) 182/70 (BP Location: Left arm, Patient Position: Lying)   Pulse 78   Temp 36.8 °C (98.2 °F) (Oral)   Resp 18   Ht 5' 4" (1.626 m)   Wt 61 kg (134 lb 7.7 oz)   SpO2 98%   Breastfeeding? No   BMI 23.08 kg/m²     "

## 2019-05-08 NOTE — PLAN OF CARE
Pt prepared for procedure.    Pt resting comfortably in room, call light within reach.    Will continue to monitor

## 2019-05-08 NOTE — PROGRESS NOTES
[facility]  General Surgery  Progress Note    Subjective:     History of Present Illness:  No notes on file    Post-Op Info:  Procedure(s) (LRB):  ERCP (ENDOSCOPIC RETROGRADE CHOLANGIOPANCREATOGRAPHY) (N/A)   2 Days Post-Op     Interval History: No acute events overnight. NPO for surgery.     Medications:  Continuous Infusions:   sodium chloride 0.9% 100 mL/hr at 05/08/19 0000     Scheduled Meds:   enoxaparin  40 mg Subcutaneous Daily    pantoprazole  40 mg Intravenous Daily    piperacillin-tazobactam (ZOSYN) IVPB  4.5 g Intravenous Q8H    simvastatin  20 mg Oral QHS    sodium chloride 0.9%  3 mL Intravenous Q8H     PRN Meds:acetaminophen, diphenhydrAMINE, hydrALAZINE, HYDROmorphone, ondansetron, ramelteon     Review of patient's allergies indicates:   Allergen Reactions    Meloxicam Rash     Flushing       Objective:     Vital Signs (Most Recent):  Temp: 98.1 °F (36.7 °C) (05/08/19 0428)  Pulse: 78 (05/08/19 0428)  Resp: 18 (05/08/19 0428)  BP: (!) 182/81 (05/08/19 0428)  SpO2: 97 % (05/08/19 0428) Vital Signs (24h Range):  Temp:  [97.5 °F (36.4 °C)-98.4 °F (36.9 °C)] 98.1 °F (36.7 °C)  Pulse:  [70-78] 78  Resp:  [16-20] 18  SpO2:  [96 %-98 %] 97 %  BP: (150-182)/(72-81) 182/81     Weight: 61 kg (134 lb 7.7 oz)  Body mass index is 23.08 kg/m².    Intake/Output - Last 3 Shifts       05/06 0700 - 05/07 0659 05/07 0700 - 05/08 0659    P.O. 480     I.V. (mL/kg) 3246.3 (53.2) 403.3 (6.6)    IV Piggyback 200 550    Total Intake(mL/kg) 3926.3 (64.4) 953.3 (15.6)    Net +3926.3 +953.3          Urine Occurrence 9 x 7 x    Stool Occurrence 3 x 1 x          Physical Exam   Constitutional: She appears well-developed and well-nourished. No distress.   HENT:   Head: Normocephalic and atraumatic.   Eyes: Conjunctivae are normal.   Neck: Normal range of motion.   Cardiovascular: Normal rate.   Pulmonary/Chest: Effort normal.   Abdominal: Soft. There is no tenderness.   Neurological: She is alert.   Skin:  Skin is warm and dry.   Psychiatric: She has a normal mood and affect. Her behavior is normal.   Nursing note and vitals reviewed.      Significant Labs:  CBC:   Recent Labs   Lab 05/08/19  0445   WBC 6.26   RBC 3.81*   HGB 11.8*   HCT 36.3*      MCV 95   MCH 31.0   MCHC 32.5     CMP:   Recent Labs   Lab 05/08/19  0445   GLU 87   CALCIUM 8.6*   ALBUMIN 2.9*   PROT 5.7*      K 3.6   CO2 20*   *   BUN 9   CREATININE 0.6   ALKPHOS 261*   *   AST 72*   BILITOT 1.5*       Significant Diagnostics:  I have reviewed all pertinent imaging results/findings within the past 24 hours.    Assessment/Plan:     Epigastric abdominal pain  73-year-old female presented with abdominal pain found to have evidence of choledocholithiasis without cholecystitis. Now s/p ERCP with stone removal. Pus encountered. Started zosyn. T bili downtrending.     -OR for surgery, plan to advance diet after surgery  -continue zosyn for pus in CBD.   -mIVF until tolerating diet.   -tylenol for headaches.   -replace lytes  -home meds  -GI and DVT ppx        Ethan Piedra MD  General Surgery  [facility]

## 2019-05-09 ENCOUNTER — TELEPHONE (OUTPATIENT)
Dept: ENDOSCOPY | Facility: HOSPITAL | Age: 74
End: 2019-05-09

## 2019-05-09 DIAGNOSIS — K80.50 COMMON BILE DUCT STONE: Primary | ICD-10-CM

## 2019-05-09 LAB
ALBUMIN SERPL BCP-MCNC: 2.6 G/DL (ref 3.5–5.2)
ALP SERPL-CCNC: 192 U/L (ref 55–135)
ALT SERPL W/O P-5'-P-CCNC: 245 U/L (ref 10–44)
ANION GAP SERPL CALC-SCNC: 13 MMOL/L (ref 8–16)
AST SERPL-CCNC: 80 U/L (ref 10–40)
BASOPHILS # BLD AUTO: 0.01 K/UL (ref 0–0.2)
BASOPHILS # BLD AUTO: 0.01 K/UL (ref 0–0.2)
BASOPHILS # BLD AUTO: 0.02 K/UL (ref 0–0.2)
BASOPHILS NFR BLD: 0.1 % (ref 0–1.9)
BASOPHILS NFR BLD: 0.1 % (ref 0–1.9)
BASOPHILS NFR BLD: 0.2 % (ref 0–1.9)
BILIRUB SERPL-MCNC: 1.1 MG/DL (ref 0.1–1)
BUN SERPL-MCNC: 12 MG/DL (ref 8–23)
CALCIUM SERPL-MCNC: 7.4 MG/DL (ref 8.7–10.5)
CHLORIDE SERPL-SCNC: 111 MMOL/L (ref 95–110)
CO2 SERPL-SCNC: 18 MMOL/L (ref 23–29)
CREAT SERPL-MCNC: 0.7 MG/DL (ref 0.5–1.4)
DIFFERENTIAL METHOD: ABNORMAL
EOSINOPHIL # BLD AUTO: 0 K/UL (ref 0–0.5)
EOSINOPHIL NFR BLD: 0 % (ref 0–8)
ERYTHROCYTE [DISTWIDTH] IN BLOOD BY AUTOMATED COUNT: 12.9 % (ref 11.5–14.5)
ERYTHROCYTE [DISTWIDTH] IN BLOOD BY AUTOMATED COUNT: 13 % (ref 11.5–14.5)
ERYTHROCYTE [DISTWIDTH] IN BLOOD BY AUTOMATED COUNT: 13.2 % (ref 11.5–14.5)
ERYTHROCYTE [DISTWIDTH] IN BLOOD BY AUTOMATED COUNT: 13.2 % (ref 11.5–14.5)
EST. GFR  (AFRICAN AMERICAN): >60 ML/MIN/1.73 M^2
EST. GFR  (NON AFRICAN AMERICAN): >60 ML/MIN/1.73 M^2
GLUCOSE SERPL-MCNC: 151 MG/DL (ref 70–110)
HCT VFR BLD AUTO: 21.3 % (ref 37–48.5)
HCT VFR BLD AUTO: 24 % (ref 37–48.5)
HCT VFR BLD AUTO: 24.4 % (ref 37–48.5)
HCT VFR BLD AUTO: 25 % (ref 37–48.5)
HCT VFR BLD AUTO: 25.4 % (ref 37–48.5)
HCT VFR BLD AUTO: 26.9 % (ref 37–48.5)
HGB BLD-MCNC: 6.8 G/DL (ref 12–16)
HGB BLD-MCNC: 7.6 G/DL (ref 12–16)
HGB BLD-MCNC: 7.9 G/DL (ref 12–16)
HGB BLD-MCNC: 8 G/DL (ref 12–16)
HGB BLD-MCNC: 8.1 G/DL (ref 12–16)
HGB BLD-MCNC: 8.7 G/DL (ref 12–16)
IMM GRANULOCYTES # BLD AUTO: 0.04 K/UL (ref 0–0.04)
IMM GRANULOCYTES # BLD AUTO: 0.04 K/UL (ref 0–0.04)
IMM GRANULOCYTES # BLD AUTO: 0.06 K/UL (ref 0–0.04)
IMM GRANULOCYTES # BLD AUTO: 0.07 K/UL (ref 0–0.04)
IMM GRANULOCYTES # BLD AUTO: 0.08 K/UL (ref 0–0.04)
IMM GRANULOCYTES # BLD AUTO: 0.1 K/UL (ref 0–0.04)
IMM GRANULOCYTES NFR BLD AUTO: 0.4 % (ref 0–0.5)
IMM GRANULOCYTES NFR BLD AUTO: 0.4 % (ref 0–0.5)
IMM GRANULOCYTES NFR BLD AUTO: 0.5 % (ref 0–0.5)
IMM GRANULOCYTES NFR BLD AUTO: 0.6 % (ref 0–0.5)
IMM GRANULOCYTES NFR BLD AUTO: 0.8 % (ref 0–0.5)
IMM GRANULOCYTES NFR BLD AUTO: 0.8 % (ref 0–0.5)
LACTATE SERPL-SCNC: 0.8 MMOL/L (ref 0.5–2.2)
LACTATE SERPL-SCNC: 1.8 MMOL/L (ref 0.5–2.2)
LACTATE SERPL-SCNC: 4.2 MMOL/L (ref 0.5–2.2)
LYMPHOCYTES # BLD AUTO: 0.9 K/UL (ref 1–4.8)
LYMPHOCYTES # BLD AUTO: 1 K/UL (ref 1–4.8)
LYMPHOCYTES # BLD AUTO: 1.1 K/UL (ref 1–4.8)
LYMPHOCYTES # BLD AUTO: 1.6 K/UL (ref 1–4.8)
LYMPHOCYTES # BLD AUTO: 1.8 K/UL (ref 1–4.8)
LYMPHOCYTES # BLD AUTO: 1.9 K/UL (ref 1–4.8)
LYMPHOCYTES NFR BLD: 15.1 % (ref 18–48)
LYMPHOCYTES NFR BLD: 16.5 % (ref 18–48)
LYMPHOCYTES NFR BLD: 18 % (ref 18–48)
LYMPHOCYTES NFR BLD: 8.6 % (ref 18–48)
LYMPHOCYTES NFR BLD: 8.7 % (ref 18–48)
LYMPHOCYTES NFR BLD: 8.9 % (ref 18–48)
MAGNESIUM SERPL-MCNC: 1.9 MG/DL (ref 1.6–2.6)
MCH RBC QN AUTO: 31.1 PG (ref 27–31)
MCH RBC QN AUTO: 31.4 PG (ref 27–31)
MCH RBC QN AUTO: 31.6 PG (ref 27–31)
MCH RBC QN AUTO: 31.8 PG (ref 27–31)
MCH RBC QN AUTO: 32 PG (ref 27–31)
MCH RBC QN AUTO: 32 PG (ref 27–31)
MCHC RBC AUTO-ENTMCNC: 31.7 G/DL (ref 32–36)
MCHC RBC AUTO-ENTMCNC: 31.9 G/DL (ref 32–36)
MCHC RBC AUTO-ENTMCNC: 31.9 G/DL (ref 32–36)
MCHC RBC AUTO-ENTMCNC: 32 G/DL (ref 32–36)
MCHC RBC AUTO-ENTMCNC: 32.3 G/DL (ref 32–36)
MCHC RBC AUTO-ENTMCNC: 32.4 G/DL (ref 32–36)
MCV RBC AUTO: 100 FL (ref 82–98)
MCV RBC AUTO: 100 FL (ref 82–98)
MCV RBC AUTO: 98 FL (ref 82–98)
MCV RBC AUTO: 99 FL (ref 82–98)
MONOCYTES # BLD AUTO: 0.6 K/UL (ref 0.3–1)
MONOCYTES # BLD AUTO: 0.6 K/UL (ref 0.3–1)
MONOCYTES # BLD AUTO: 0.7 K/UL (ref 0.3–1)
MONOCYTES # BLD AUTO: 1 K/UL (ref 0.3–1)
MONOCYTES # BLD AUTO: 1.1 K/UL (ref 0.3–1)
MONOCYTES # BLD AUTO: 1.2 K/UL (ref 0.3–1)
MONOCYTES NFR BLD: 10.3 % (ref 4–15)
MONOCYTES NFR BLD: 11.4 % (ref 4–15)
MONOCYTES NFR BLD: 5.7 % (ref 4–15)
MONOCYTES NFR BLD: 5.7 % (ref 4–15)
MONOCYTES NFR BLD: 5.8 % (ref 4–15)
MONOCYTES NFR BLD: 8.8 % (ref 4–15)
NEUTROPHILS # BLD AUTO: 10.6 K/UL (ref 1.8–7.7)
NEUTROPHILS # BLD AUTO: 7.3 K/UL (ref 1.8–7.7)
NEUTROPHILS # BLD AUTO: 8.1 K/UL (ref 1.8–7.7)
NEUTROPHILS # BLD AUTO: 8.2 K/UL (ref 1.8–7.7)
NEUTROPHILS # BLD AUTO: 8.9 K/UL (ref 1.8–7.7)
NEUTROPHILS # BLD AUTO: 9.3 K/UL (ref 1.8–7.7)
NEUTROPHILS NFR BLD: 70 % (ref 38–73)
NEUTROPHILS NFR BLD: 74 % (ref 38–73)
NEUTROPHILS NFR BLD: 74.1 % (ref 38–73)
NEUTROPHILS NFR BLD: 84.6 % (ref 38–73)
NEUTROPHILS NFR BLD: 84.6 % (ref 38–73)
NEUTROPHILS NFR BLD: 84.7 % (ref 38–73)
NRBC BLD-RTO: 0 /100 WBC
PHOSPHATE SERPL-MCNC: 4.7 MG/DL (ref 2.7–4.5)
PLATELET # BLD AUTO: 253 K/UL (ref 150–350)
PLATELET # BLD AUTO: 254 K/UL (ref 150–350)
PLATELET # BLD AUTO: 254 K/UL (ref 150–350)
PLATELET # BLD AUTO: 275 K/UL (ref 150–350)
PLATELET # BLD AUTO: 278 K/UL (ref 150–350)
PLATELET # BLD AUTO: 295 K/UL (ref 150–350)
PMV BLD AUTO: 10 FL (ref 9.2–12.9)
PMV BLD AUTO: 10.1 FL (ref 9.2–12.9)
PMV BLD AUTO: 10.2 FL (ref 9.2–12.9)
PMV BLD AUTO: 10.2 FL (ref 9.2–12.9)
PMV BLD AUTO: 10.3 FL (ref 9.2–12.9)
PMV BLD AUTO: 9.9 FL (ref 9.2–12.9)
POCT GLUCOSE: 162 MG/DL (ref 70–110)
POTASSIUM SERPL-SCNC: 3.7 MMOL/L (ref 3.5–5.1)
PROT SERPL-MCNC: 4.9 G/DL (ref 6–8.4)
RBC # BLD AUTO: 2.14 M/UL (ref 4–5.4)
RBC # BLD AUTO: 2.44 M/UL (ref 4–5.4)
RBC # BLD AUTO: 2.5 M/UL (ref 4–5.4)
RBC # BLD AUTO: 2.5 M/UL (ref 4–5.4)
RBC # BLD AUTO: 2.58 M/UL (ref 4–5.4)
RBC # BLD AUTO: 2.72 M/UL (ref 4–5.4)
SODIUM SERPL-SCNC: 142 MMOL/L (ref 136–145)
WBC # BLD AUTO: 10.39 K/UL (ref 3.9–12.7)
WBC # BLD AUTO: 10.51 K/UL (ref 3.9–12.7)
WBC # BLD AUTO: 10.89 K/UL (ref 3.9–12.7)
WBC # BLD AUTO: 10.93 K/UL (ref 3.9–12.7)
WBC # BLD AUTO: 11.11 K/UL (ref 3.9–12.7)
WBC # BLD AUTO: 12.56 K/UL (ref 3.9–12.7)

## 2019-05-09 PROCEDURE — 86901 BLOOD TYPING SEROLOGIC RH(D): CPT

## 2019-05-09 PROCEDURE — 36415 COLL VENOUS BLD VENIPUNCTURE: CPT

## 2019-05-09 PROCEDURE — 25000003 PHARM REV CODE 250: Performed by: STUDENT IN AN ORGANIZED HEALTH CARE EDUCATION/TRAINING PROGRAM

## 2019-05-09 PROCEDURE — 20600001 HC STEP DOWN PRIVATE ROOM

## 2019-05-09 PROCEDURE — 63600175 PHARM REV CODE 636 W HCPCS: Performed by: STUDENT IN AN ORGANIZED HEALTH CARE EDUCATION/TRAINING PROGRAM

## 2019-05-09 PROCEDURE — 83605 ASSAY OF LACTIC ACID: CPT

## 2019-05-09 PROCEDURE — A4216 STERILE WATER/SALINE, 10 ML: HCPCS | Performed by: STUDENT IN AN ORGANIZED HEALTH CARE EDUCATION/TRAINING PROGRAM

## 2019-05-09 PROCEDURE — 85025 COMPLETE CBC W/AUTO DIFF WBC: CPT | Mod: 91

## 2019-05-09 PROCEDURE — C9113 INJ PANTOPRAZOLE SODIUM, VIA: HCPCS | Performed by: STUDENT IN AN ORGANIZED HEALTH CARE EDUCATION/TRAINING PROGRAM

## 2019-05-09 PROCEDURE — 83605 ASSAY OF LACTIC ACID: CPT | Mod: 91

## 2019-05-09 PROCEDURE — 80053 COMPREHEN METABOLIC PANEL: CPT

## 2019-05-09 PROCEDURE — 84100 ASSAY OF PHOSPHORUS: CPT

## 2019-05-09 PROCEDURE — 83735 ASSAY OF MAGNESIUM: CPT

## 2019-05-09 PROCEDURE — 86920 COMPATIBILITY TEST SPIN: CPT

## 2019-05-09 RX ORDER — SODIUM CHLORIDE, SODIUM LACTATE, POTASSIUM CHLORIDE, CALCIUM CHLORIDE 600; 310; 30; 20 MG/100ML; MG/100ML; MG/100ML; MG/100ML
INJECTION, SOLUTION INTRAVENOUS CONTINUOUS
Status: DISCONTINUED | OUTPATIENT
Start: 2019-05-09 | End: 2019-05-10

## 2019-05-09 RX ORDER — ACETAMINOPHEN 10 MG/ML
1000 INJECTION, SOLUTION INTRAVENOUS ONCE
Status: COMPLETED | OUTPATIENT
Start: 2019-05-09 | End: 2019-05-09

## 2019-05-09 RX ORDER — HYDROCODONE BITARTRATE AND ACETAMINOPHEN 500; 5 MG/1; MG/1
TABLET ORAL
Status: DISCONTINUED | OUTPATIENT
Start: 2019-05-10 | End: 2019-05-11 | Stop reason: HOSPADM

## 2019-05-09 RX ADMIN — PANTOPRAZOLE SODIUM 40 MG: 40 INJECTION, POWDER, LYOPHILIZED, FOR SOLUTION INTRAVENOUS at 08:05

## 2019-05-09 RX ADMIN — SODIUM CHLORIDE: 0.9 INJECTION, SOLUTION INTRAVENOUS at 04:05

## 2019-05-09 RX ADMIN — PIPERACILLIN AND TAZOBACTAM 4.5 G: 4; .5 INJECTION, POWDER, LYOPHILIZED, FOR SOLUTION INTRAVENOUS; PARENTERAL at 04:05

## 2019-05-09 RX ADMIN — SODIUM CHLORIDE 1000 ML: 0.9 INJECTION, SOLUTION INTRAVENOUS at 01:05

## 2019-05-09 RX ADMIN — OXYCODONE HYDROCHLORIDE 10 MG: 10 TABLET ORAL at 05:05

## 2019-05-09 RX ADMIN — ACETAMINOPHEN 1000 MG: 10 INJECTION, SOLUTION INTRAVENOUS at 05:05

## 2019-05-09 RX ADMIN — SIMVASTATIN 20 MG: 20 TABLET, FILM COATED ORAL at 08:05

## 2019-05-09 RX ADMIN — SODIUM CHLORIDE, SODIUM LACTATE, POTASSIUM CHLORIDE, AND CALCIUM CHLORIDE: 600; 310; 30; 20 INJECTION, SOLUTION INTRAVENOUS at 11:05

## 2019-05-09 RX ADMIN — SODIUM CHLORIDE 1000 ML: 0.9 INJECTION, SOLUTION INTRAVENOUS at 03:05

## 2019-05-09 RX ADMIN — OXYCODONE HYDROCHLORIDE 10 MG: 10 TABLET ORAL at 02:05

## 2019-05-09 RX ADMIN — HYDROMORPHONE HYDROCHLORIDE 0.5 MG: 1 INJECTION, SOLUTION INTRAMUSCULAR; INTRAVENOUS; SUBCUTANEOUS at 04:05

## 2019-05-09 RX ADMIN — Medication 3 ML: at 02:05

## 2019-05-09 RX ADMIN — PIPERACILLIN AND TAZOBACTAM 4.5 G: 4; .5 INJECTION, POWDER, LYOPHILIZED, FOR SOLUTION INTRAVENOUS; PARENTERAL at 11:05

## 2019-05-09 RX ADMIN — Medication 3 ML: at 06:05

## 2019-05-09 NOTE — PLAN OF CARE
Problem: Adult Inpatient Plan of Care  Goal: Plan of Care Review  Outcome: Ongoing (interventions implemented as appropriate)  POC reviewed and understood by patient. Patient's AAOx4. Pain managed by prn pain meds per md order. Patient tolerated clear liquid diet at beginning of shift. During the night patient became weak and dizzy. BP was 64/55 with a stable HR of 78. MD Prado ordered 2 L bolus of NS, labs, and changed patient's diet to NPO. Patient urinated twice during the shift but had difficulty time urinating the third time. Bladder scan showed 118 cc of urine. After boluses pt's VS were stable. Call light WNR. Bed in lowest position. WCTM.

## 2019-05-09 NOTE — ANESTHESIA POSTPROCEDURE EVALUATION
Anesthesia Post Evaluation    Patient: Torie Richard    Procedure(s) Performed: Procedure(s) (LRB):  CHOLECYSTECTOMY, LAPAROSCOPIC (N/A)    Final Anesthesia Type: general  Patient location during evaluation: PACU  Patient participation: Yes- Able to Participate  Level of consciousness: awake and alert  Post-procedure vital signs: reviewed and stable  Pain management: adequate  Airway patency: patent  PONV status at discharge: No PONV  Anesthetic complications: no      Cardiovascular status: blood pressure returned to baseline  Respiratory status: unassisted, spontaneous ventilation and room air  Hydration status: euvolemic            Vitals Value Taken Time   /59 5/9/2019  4:26 AM   Temp 36.6 °C (97.8 °F) 5/9/2019  4:26 AM   Pulse 91 5/9/2019  4:26 AM   Resp 18 5/9/2019  4:26 AM   SpO2 94 % 5/9/2019  4:26 AM         Event Time     Out of Recovery 05/08/2019 18:46:20          Pain/Rody Score: Pain Rating Prior to Med Admin: 10 (5/9/2019  2:32 AM)  Pain Rating Post Med Admin: 3 (5/8/2019  6:15 PM)  Rody Score: 10 (5/8/2019  6:15 PM)

## 2019-05-09 NOTE — SUBJECTIVE & OBJECTIVE
Interval History: hypotensive to 60/40s overnight with 4g drop in hemoglobin and lactate to 4.2. Responded well to fluid boluses with hemoglobin now stable and blood pressure and lactate much improved. Has some pain in RUQ but is otherwise feeling well.     Medications:  Continuous Infusions:   sodium chloride 0.9% 100 mL/hr at 05/09/19 0421     Scheduled Meds:   pantoprazole  40 mg Intravenous Daily    piperacillin-tazobactam (ZOSYN) IVPB  4.5 g Intravenous Q8H    simvastatin  20 mg Oral QHS    sodium chloride 0.9%  3 mL Intravenous Q8H     PRN Meds:acetaminophen, diphenhydrAMINE, hydrALAZINE, HYDROcodone-acetaminophen, HYDROmorphone, ondansetron, oxyCODONE, oxyCODONE     Review of patient's allergies indicates:   Allergen Reactions    Meloxicam Rash     Flushing       Objective:     Vital Signs (Most Recent):  Temp: 98.1 °F (36.7 °C) (05/09/19 0803)  Pulse: 82 (05/09/19 0803)  Resp: 16 (05/09/19 0803)  BP: (!) 114/54 (05/09/19 0803)  SpO2: (!) 94 % (05/09/19 0803) Vital Signs (24h Range):  Temp:  [97.5 °F (36.4 °C)-98.4 °F (36.9 °C)] 98.1 °F (36.7 °C)  Pulse:  [62-91] 82  Resp:  [16-36] 16  SpO2:  [91 %-98 %] 94 %  BP: ()/(45-80) 114/54     Weight: 61 kg (134 lb 7.7 oz)  Body mass index is 23.08 kg/m².    Intake/Output - Last 3 Shifts       05/07 0700 - 05/08 0659 05/08 0700 - 05/09 0659 05/09 0700 - 05/10 0659    P.O.       I.V. (mL/kg) 600 (9.8) 1400 (23)     IV Piggyback 550 2550     Total Intake(mL/kg) 1150 (18.9) 3950 (64.8)     Urine (mL/kg/hr)  0 (0)     Blood  50     Total Output  50     Net +1150 +3900            Urine Occurrence 9 x 5 x 1 x    Stool Occurrence 1 x            Physical Exam   Constitutional: She appears well-developed and well-nourished. No distress.   HENT:   Head: Normocephalic and atraumatic.   Eyes: Conjunctivae are normal.   Neck: Normal range of motion.   Cardiovascular: Normal rate and regular rhythm.   Pulmonary/Chest: Effort normal.   Abdominal: Soft. There is  tenderness (RUQ).   Incisions clean, dry, and intact.    Neurological: She is alert.   Skin: Skin is warm and dry.   Psychiatric: She has a normal mood and affect. Her behavior is normal.   Nursing note and vitals reviewed.      Significant Labs:  CBC:   Recent Labs   Lab 05/09/19  0452   WBC 10.93  10.51   RBC 2.58*  2.50*   HGB 8.1*  7.9*   HCT 25.4*  24.4*     254   MCV 98  98   MCH 31.4*  31.6*   MCHC 31.9*  32.4     CMP:   Recent Labs   Lab 05/09/19  0245   *   CALCIUM 7.4*   ALBUMIN 2.6*   PROT 4.9*      K 3.7   CO2 18*   *   BUN 12   CREATININE 0.7   ALKPHOS 192*   *   AST 80*   BILITOT 1.1*       Significant Diagnostics:  I have reviewed all pertinent imaging results/findings within the past 24 hours.

## 2019-05-09 NOTE — PROGRESS NOTES
[facility]  General Surgery  Progress Note    Subjective:     History of Present Illness:  No notes on file    Post-Op Info:  Procedure(s) (LRB):  CHOLECYSTECTOMY, LAPAROSCOPIC (N/A)   1 Day Post-Op     Interval History: hypotensive to 60/40s overnight with 4g drop in hemoglobin and lactate to 4.2. Responded well to fluid boluses with hemoglobin now stable and blood pressure and lactate much improved. Has some pain in RUQ but is otherwise feeling well.     Medications:  Continuous Infusions:   sodium chloride 0.9% 100 mL/hr at 05/09/19 0421     Scheduled Meds:   pantoprazole  40 mg Intravenous Daily    piperacillin-tazobactam (ZOSYN) IVPB  4.5 g Intravenous Q8H    simvastatin  20 mg Oral QHS    sodium chloride 0.9%  3 mL Intravenous Q8H     PRN Meds:acetaminophen, diphenhydrAMINE, hydrALAZINE, HYDROcodone-acetaminophen, HYDROmorphone, ondansetron, oxyCODONE, oxyCODONE     Review of patient's allergies indicates:   Allergen Reactions    Meloxicam Rash     Flushing       Objective:     Vital Signs (Most Recent):  Temp: 98.1 °F (36.7 °C) (05/09/19 0803)  Pulse: 82 (05/09/19 0803)  Resp: 16 (05/09/19 0803)  BP: (!) 114/54 (05/09/19 0803)  SpO2: (!) 94 % (05/09/19 0803) Vital Signs (24h Range):  Temp:  [97.5 °F (36.4 °C)-98.4 °F (36.9 °C)] 98.1 °F (36.7 °C)  Pulse:  [62-91] 82  Resp:  [16-36] 16  SpO2:  [91 %-98 %] 94 %  BP: ()/(45-80) 114/54     Weight: 61 kg (134 lb 7.7 oz)  Body mass index is 23.08 kg/m².    Intake/Output - Last 3 Shifts       05/07 0700 - 05/08 0659 05/08 0700 - 05/09 0659 05/09 0700 - 05/10 0659    P.O.       I.V. (mL/kg) 600 (9.8) 1400 (23)     IV Piggyback 550 2550     Total Intake(mL/kg) 1150 (18.9) 3950 (64.8)     Urine (mL/kg/hr)  0 (0)     Blood  50     Total Output  50     Net +1150 +3900            Urine Occurrence 9 x 5 x 1 x    Stool Occurrence 1 x            Physical Exam   Constitutional: She appears well-developed and well-nourished. No distress.   HENT:    Head: Normocephalic and atraumatic.   Eyes: Conjunctivae are normal.   Neck: Normal range of motion.   Cardiovascular: Normal rate and regular rhythm.   Pulmonary/Chest: Effort normal.   Abdominal: Soft. There is tenderness (RUQ).   Incisions clean, dry, and intact.    Neurological: She is alert.   Skin: Skin is warm and dry.   Psychiatric: She has a normal mood and affect. Her behavior is normal.   Nursing note and vitals reviewed.      Significant Labs:  CBC:   Recent Labs   Lab 05/09/19  0452   WBC 10.93  10.51   RBC 2.58*  2.50*   HGB 8.1*  7.9*   HCT 25.4*  24.4*     254   MCV 98  98   MCH 31.4*  31.6*   MCHC 31.9*  32.4     CMP:   Recent Labs   Lab 05/09/19  0245   *   CALCIUM 7.4*   ALBUMIN 2.6*   PROT 4.9*      K 3.7   CO2 18*   *   BUN 12   CREATININE 0.7   ALKPHOS 192*   *   AST 80*   BILITOT 1.1*       Significant Diagnostics:  I have reviewed all pertinent imaging results/findings within the past 24 hours.    Assessment/Plan:     Epigastric abdominal pain  73-year-old female presented with abdominal pain found to have evidence of choledocholithiasis without cholecystitis. Now s/p ERCP with stone removal. Started on IV abx for pus in CBD. Lap bharat 05/08/19 with hypotensive episode overnight. Lactate and blood pressure improved and hemoglobin stable.     -keep NPO for now while trending CBC  -trend CBC, 0900 and 1500 today  -keep in house additional day to monitor for bleeding  -mIVF for time being while NPO  -Stop lovenox  -IS  -GI ppx  -home meds  -maybe d/c tomorrow        Ethan Piedra MD  General Surgery  [facility]

## 2019-05-09 NOTE — PLAN OF CARE
Problem: Adult Inpatient Plan of Care  Goal: Plan of Care Review  Outcome: Ongoing (interventions implemented as appropriate)  Patient is alert and oriented. Able to make needs known. PRN Dilaudid given for pain control. No s/s of respiratory/cardiac distress noted. Abdominal incision is clean, dry, and dermabond is intact. PIVs are patent and flush well. IV fluids have been discontinued. Patient has been placed on a regular diet and is tolerating it well. Writer attempted to walk patient in the hallway but patient got very dizzy and had to sit down. Patient's BP decreased and patient was placed back into the bed and did not walk. Patient's BP recheck was WNLs. No issues or concerns at this time. Continue to monitor.

## 2019-05-09 NOTE — PROGRESS NOTES
Subjective:   Torie Richard is POD1 from laparoscopic cholecystectomy. Called by her nurse around 2am that the patient was feeling lightheaded when she was getting up to use the commode.     Objective:   Vitals taken and BP 64/45, HR 77. No urine output since beginning of shift. Ordered 1L NS and STAT CBC and lactate. BP responsive to fluid, SBP up to 108mmHg. A 2nd 1L NS bolus was ordered. Labs resulted around 2:45am, showing Hb of 8.7 (down from 11.8 pre-op) and Lactate of 4.1.      Assessment/Plan:   Pt is anemic with decreased urine output, likely secondary to post-operative blood loss. Patient currently hemodynamically stable. CBC and lactate reordered for 6am. Will continue to closely monitor. Chief resident is aware of patient's condition and is in agreement with this plan.    Mily Prado MD  General Surgery Resident, PGY 1  Pager [phone]

## 2019-05-09 NOTE — PHYSICIAN QUERY
"PT Name: Torie Richard  MR #: [id number]    Physician Query Form - Hematology Clarification      CDS: Los GREENFIELD,RN    Contact information:[phone]    This form is a permanent document in the medical record.      Query Date: May 9, 2019    By submitting this query, we are merely seeking further clarification of documentation. Please utilize your independent clinical judgment when addressing the question(s) below.    The Medical record contains the following:   Indicators  Supporting Clinical Findings Location in Medical Record    "Anemia" documented     x H & H =  Hgb= 15.0--->11.8--->8.7--->7.9---->8.1--->7.6   Hct= 44.2---->36.3-->26.9-->24.4-->25.4-->24.0  5/5----------->5/9 Labs   x BP =                     HR=  SBP= 64-------->182   DBP= 45-------> 80   HR=   62------> 91   5/9/19 General Surgery Progress Note   x "GI bleeding" documented However, there as a small injury to the cystic artery distal to where it entered the gallbladder, so we triply clipped it and divided it. Once we were satisfied with our hemostasis, we clipped the duct and divided it.     5/8/19 General Surgery Op Note    Acute bleeding (Non GI site)      Transfusion(s)     x Treatment: Sodium chloride 0.9% bolus 1,000ml Intravenous 1 dose ( 5/9/19@0305 given)  Sodium chloride 0.9% bolus 1,000 ml Intravenous 1 dose ( 5/9/19@0151 given)    5/9/19 MAR    Other:        Provider, please specify diagnosis or diagnoses associated with above clinical findings.    [  ] Acute blood loss anemia expected post-operatively   [ x ] Acute blood loss anemia     [  ] Other Hematological Diagnosis (please specify):     [  ] Clinically Undetermined       Please document in your progress notes daily for the duration of treatment, until resolved, and include in your discharge summary.                                                                                                      "

## 2019-05-09 NOTE — PROGRESS NOTES
Notified MD Prado about patient's decreased BP of 64/55 and weakness and dizziness. MD ordered 1L bolus NS and STAT CBC. WCTM.

## 2019-05-09 NOTE — ASSESSMENT & PLAN NOTE
73-year-old female presented with abdominal pain found to have evidence of choledocholithiasis without cholecystitis. Now s/p ERCP with stone removal. Started on IV abx for pus in CBD. Lap bharat 05/08/19 with hypotensive episode overnight. Lactate and blood pressure improved and hemoglobin stable.     -keep NPO for now while trending CBC  -trend CBC, 0900 and 1500 today  -keep in house additional day to monitor for bleeding  -mIVF for time being while NPO  -Stop lovenox  -IS  -GI ppx  -home meds  -maybe d/c tomorrow

## 2019-05-10 PROBLEM — K83.1 BILIARY OBSTRUCTION: Status: RESOLVED | Noted: 2019-05-06 | Resolved: 2019-05-10

## 2019-05-10 LAB
ABO + RH BLD: NORMAL
ALBUMIN SERPL BCP-MCNC: 2.8 G/DL (ref 3.5–5.2)
ALP SERPL-CCNC: 155 U/L (ref 55–135)
ALT SERPL W/O P-5'-P-CCNC: 206 U/L (ref 10–44)
ANION GAP SERPL CALC-SCNC: 6 MMOL/L (ref 8–16)
AST SERPL-CCNC: 65 U/L (ref 10–40)
BASOPHILS # BLD AUTO: 0.06 K/UL (ref 0–0.2)
BASOPHILS # BLD AUTO: 0.07 K/UL (ref 0–0.2)
BASOPHILS NFR BLD: 0.4 % (ref 0–1.9)
BASOPHILS NFR BLD: 0.5 % (ref 0–1.9)
BILIRUB SERPL-MCNC: 1.7 MG/DL (ref 0.1–1)
BLD GP AB SCN CELLS X3 SERPL QL: NORMAL
BLD PROD TYP BPU: NORMAL
BLD PROD TYP BPU: NORMAL
BLOOD UNIT EXPIRATION DATE: NORMAL
BLOOD UNIT EXPIRATION DATE: NORMAL
BLOOD UNIT TYPE CODE: 1700
BLOOD UNIT TYPE CODE: 1700
BLOOD UNIT TYPE: NORMAL
BLOOD UNIT TYPE: NORMAL
BUN SERPL-MCNC: 17 MG/DL (ref 8–23)
CALCIUM SERPL-MCNC: 8.1 MG/DL (ref 8.7–10.5)
CHLORIDE SERPL-SCNC: 111 MMOL/L (ref 95–110)
CO2 SERPL-SCNC: 22 MMOL/L (ref 23–29)
CODING SYSTEM: NORMAL
CODING SYSTEM: NORMAL
CREAT SERPL-MCNC: 0.7 MG/DL (ref 0.5–1.4)
DIFFERENTIAL METHOD: ABNORMAL
DIFFERENTIAL METHOD: ABNORMAL
DISPENSE STATUS: NORMAL
DISPENSE STATUS: NORMAL
EOSINOPHIL # BLD AUTO: 0 K/UL (ref 0–0.5)
EOSINOPHIL # BLD AUTO: 0 K/UL (ref 0–0.5)
EOSINOPHIL NFR BLD: 0.1 % (ref 0–8)
EOSINOPHIL NFR BLD: 0.2 % (ref 0–8)
ERYTHROCYTE [DISTWIDTH] IN BLOOD BY AUTOMATED COUNT: 13 % (ref 11.5–14.5)
ERYTHROCYTE [DISTWIDTH] IN BLOOD BY AUTOMATED COUNT: 13.8 % (ref 11.5–14.5)
EST. GFR  (AFRICAN AMERICAN): >60 ML/MIN/1.73 M^2
EST. GFR  (NON AFRICAN AMERICAN): >60 ML/MIN/1.73 M^2
GLUCOSE SERPL-MCNC: 92 MG/DL (ref 70–110)
HCT VFR BLD AUTO: 26.3 % (ref 37–48.5)
HCT VFR BLD AUTO: 31 % (ref 37–48.5)
HGB BLD-MCNC: 10.2 G/DL (ref 12–16)
HGB BLD-MCNC: 8.6 G/DL (ref 12–16)
IMM GRANULOCYTES # BLD AUTO: 0.2 K/UL (ref 0–0.04)
IMM GRANULOCYTES # BLD AUTO: 0.29 K/UL (ref 0–0.04)
IMM GRANULOCYTES NFR BLD AUTO: 1.2 % (ref 0–0.5)
IMM GRANULOCYTES NFR BLD AUTO: 2.2 % (ref 0–0.5)
LYMPHOCYTES # BLD AUTO: 2 K/UL (ref 1–4.8)
LYMPHOCYTES # BLD AUTO: 2.3 K/UL (ref 1–4.8)
LYMPHOCYTES NFR BLD: 13.8 % (ref 18–48)
LYMPHOCYTES NFR BLD: 15 % (ref 18–48)
MAGNESIUM SERPL-MCNC: 2 MG/DL (ref 1.6–2.6)
MCH RBC QN AUTO: 31 PG (ref 27–31)
MCH RBC QN AUTO: 31.3 PG (ref 27–31)
MCHC RBC AUTO-ENTMCNC: 32.7 G/DL (ref 32–36)
MCHC RBC AUTO-ENTMCNC: 32.9 G/DL (ref 32–36)
MCV RBC AUTO: 95 FL (ref 82–98)
MCV RBC AUTO: 95 FL (ref 82–98)
MONOCYTES # BLD AUTO: 1 K/UL (ref 0.3–1)
MONOCYTES # BLD AUTO: 1.1 K/UL (ref 0.3–1)
MONOCYTES NFR BLD: 6.6 % (ref 4–15)
MONOCYTES NFR BLD: 7.2 % (ref 4–15)
NEUTROPHILS # BLD AUTO: 10.1 K/UL (ref 1.8–7.7)
NEUTROPHILS # BLD AUTO: 12.9 K/UL (ref 1.8–7.7)
NEUTROPHILS NFR BLD: 74.9 % (ref 38–73)
NEUTROPHILS NFR BLD: 77.9 % (ref 38–73)
NRBC BLD-RTO: 0 /100 WBC
NRBC BLD-RTO: 0 /100 WBC
PHOSPHATE SERPL-MCNC: 2 MG/DL (ref 2.7–4.5)
PLATELET # BLD AUTO: 195 K/UL (ref 150–350)
PLATELET # BLD AUTO: 205 K/UL (ref 150–350)
PMV BLD AUTO: 10 FL (ref 9.2–12.9)
PMV BLD AUTO: 9.7 FL (ref 9.2–12.9)
POTASSIUM SERPL-SCNC: 3.8 MMOL/L (ref 3.5–5.1)
PROT SERPL-MCNC: 5.2 G/DL (ref 6–8.4)
RBC # BLD AUTO: 2.77 M/UL (ref 4–5.4)
RBC # BLD AUTO: 3.26 M/UL (ref 4–5.4)
SODIUM SERPL-SCNC: 139 MMOL/L (ref 136–145)
TRANS ERYTHROCYTES VOL PATIENT: NORMAL ML
TRANS ERYTHROCYTES VOL PATIENT: NORMAL ML
WBC # BLD AUTO: 13.45 K/UL (ref 3.9–12.7)
WBC # BLD AUTO: 16.56 K/UL (ref 3.9–12.7)

## 2019-05-10 PROCEDURE — 20600001 HC STEP DOWN PRIVATE ROOM

## 2019-05-10 PROCEDURE — 25000003 PHARM REV CODE 250: Performed by: STUDENT IN AN ORGANIZED HEALTH CARE EDUCATION/TRAINING PROGRAM

## 2019-05-10 PROCEDURE — 63600175 PHARM REV CODE 636 W HCPCS: Performed by: STUDENT IN AN ORGANIZED HEALTH CARE EDUCATION/TRAINING PROGRAM

## 2019-05-10 PROCEDURE — 84100 ASSAY OF PHOSPHORUS: CPT

## 2019-05-10 PROCEDURE — 94761 N-INVAS EAR/PLS OXIMETRY MLT: CPT

## 2019-05-10 PROCEDURE — 27000221 HC OXYGEN, UP TO 24 HOURS

## 2019-05-10 PROCEDURE — 27000646 HC AEROBIKA DEVICE

## 2019-05-10 PROCEDURE — 99900035 HC TECH TIME PER 15 MIN (STAT)

## 2019-05-10 PROCEDURE — 25000003 PHARM REV CODE 250: Performed by: SURGERY

## 2019-05-10 PROCEDURE — 36415 COLL VENOUS BLD VENIPUNCTURE: CPT

## 2019-05-10 PROCEDURE — 80053 COMPREHEN METABOLIC PANEL: CPT

## 2019-05-10 PROCEDURE — 25000242 PHARM REV CODE 250 ALT 637 W/ HCPCS: Performed by: STUDENT IN AN ORGANIZED HEALTH CARE EDUCATION/TRAINING PROGRAM

## 2019-05-10 PROCEDURE — 36430 TRANSFUSION BLD/BLD COMPNT: CPT

## 2019-05-10 PROCEDURE — 83735 ASSAY OF MAGNESIUM: CPT

## 2019-05-10 PROCEDURE — C9113 INJ PANTOPRAZOLE SODIUM, VIA: HCPCS | Performed by: STUDENT IN AN ORGANIZED HEALTH CARE EDUCATION/TRAINING PROGRAM

## 2019-05-10 PROCEDURE — 94664 DEMO&/EVAL PT USE INHALER: CPT

## 2019-05-10 PROCEDURE — 85025 COMPLETE CBC W/AUTO DIFF WBC: CPT | Mod: 91

## 2019-05-10 PROCEDURE — P9021 RED BLOOD CELLS UNIT: HCPCS

## 2019-05-10 PROCEDURE — 94640 AIRWAY INHALATION TREATMENT: CPT

## 2019-05-10 RX ORDER — ALBUTEROL SULFATE 2.5 MG/.5ML
2.5 SOLUTION RESPIRATORY (INHALATION) EVERY 4 HOURS PRN
Status: DISCONTINUED | OUTPATIENT
Start: 2019-05-10 | End: 2019-05-10

## 2019-05-10 RX ORDER — SODIUM,POTASSIUM PHOSPHATES 280-250MG
2 POWDER IN PACKET (EA) ORAL ONCE
Status: COMPLETED | OUTPATIENT
Start: 2019-05-10 | End: 2019-05-10

## 2019-05-10 RX ORDER — FUROSEMIDE 10 MG/ML
20 INJECTION INTRAMUSCULAR; INTRAVENOUS ONCE
Status: COMPLETED | OUTPATIENT
Start: 2019-05-10 | End: 2019-05-10

## 2019-05-10 RX ORDER — LOSARTAN POTASSIUM 25 MG/1
25 TABLET ORAL DAILY
Status: DISCONTINUED | OUTPATIENT
Start: 2019-05-11 | End: 2019-05-11 | Stop reason: HOSPADM

## 2019-05-10 RX ORDER — ALBUTEROL SULFATE 2.5 MG/.5ML
2.5 SOLUTION RESPIRATORY (INHALATION) EVERY 6 HOURS
Status: DISCONTINUED | OUTPATIENT
Start: 2019-05-10 | End: 2019-05-11

## 2019-05-10 RX ORDER — LOSARTAN POTASSIUM 25 MG/1
25 TABLET ORAL ONCE
Status: COMPLETED | OUTPATIENT
Start: 2019-05-10 | End: 2019-05-10

## 2019-05-10 RX ADMIN — ALBUTEROL SULFATE 2.5 MG: 2.5 SOLUTION RESPIRATORY (INHALATION) at 02:05

## 2019-05-10 RX ADMIN — DIPHENHYDRAMINE HYDROCHLORIDE 25 MG: 25 CAPSULE ORAL at 09:05

## 2019-05-10 RX ADMIN — HYDRALAZINE HYDROCHLORIDE 10 MG: 20 INJECTION INTRAMUSCULAR; INTRAVENOUS at 03:05

## 2019-05-10 RX ADMIN — SIMVASTATIN 20 MG: 20 TABLET, FILM COATED ORAL at 09:05

## 2019-05-10 RX ADMIN — OXYCODONE HYDROCHLORIDE 10 MG: 10 TABLET ORAL at 04:05

## 2019-05-10 RX ADMIN — OXYCODONE HYDROCHLORIDE 10 MG: 10 TABLET ORAL at 12:05

## 2019-05-10 RX ADMIN — OXYCODONE HYDROCHLORIDE 10 MG: 10 TABLET ORAL at 09:05

## 2019-05-10 RX ADMIN — OXYCODONE HYDROCHLORIDE 10 MG: 10 TABLET ORAL at 06:05

## 2019-05-10 RX ADMIN — FUROSEMIDE 20 MG: 10 INJECTION, SOLUTION INTRAMUSCULAR; INTRAVENOUS at 04:05

## 2019-05-10 RX ADMIN — ACETAMINOPHEN 650 MG: 325 TABLET ORAL at 09:05

## 2019-05-10 RX ADMIN — LOSARTAN POTASSIUM 25 MG: 25 TABLET, FILM COATED ORAL at 09:05

## 2019-05-10 RX ADMIN — POTASSIUM & SODIUM PHOSPHATES POWDER PACK 280-160-250 MG 2 PACKET: 280-160-250 PACK at 05:05

## 2019-05-10 RX ADMIN — PANTOPRAZOLE SODIUM 40 MG: 40 INJECTION, POWDER, LYOPHILIZED, FOR SOLUTION INTRAVENOUS at 09:05

## 2019-05-10 RX ADMIN — ALBUTEROL SULFATE 2.5 MG: 2.5 SOLUTION RESPIRATORY (INHALATION) at 08:05

## 2019-05-10 NOTE — PHYSICIAN QUERY
PT Name: Torie Richard  MR #: [id number]    Physician Query Form - Relationship to Procedure Clarification     CDS: Los GREENFIELD,RN        Contact information:[phone]    This form is a permanent document in the medical record.     Query Date: May 9, 2019      By submitting this query, we are merely seeking further clarification of documentation. Please utilize your independent clinical judgment when addressing the question(s) below.    The Medical record contains the following:  Supporting Clinical Findings Location in Medical Record   DATE OF PROCEDURE: 05/08/2019  PREOPERATIVE DIAGNOSIS: Choledocholithiasis.   POSTOPERATIVE DIAGNOSIS: Choledocholithiasis  PROCEDURE PERFORMED: Laparoscopic cholecystectomy.   Further careful dissection identified the cystic artery and we did obtain a critical view of safety. However, there as a small injury to the cystic artery distal to where it entered the gallbladder, so we triply clipped it and divided it. Once we were satisfied with our hemostasis, we clipped the duct and divided it. The gallbladder was dissected off the gallbladder fossa using Bovie electrocautery from infundibulum to fundus until free.          5/8/19 General Surgery Op Note       Please clarify if _small injury to cystic artery______________ is      [ x ] Inherent/Integral to procedure   [  ] Present, but not a complication of the procedure   [  ] Incidental finding, not clinically significant   [  ] Complication of procedure   [  ] Other (please specify): __________________   [  ] Clinically Undetermined

## 2019-05-10 NOTE — ASSESSMENT & PLAN NOTE
73-year-old female presented with abdominal pain found to have evidence of choledocholithiasis without cholecystitis. Now s/p ERCP with stone removal. Started on IV abx for pus in CBD. Lap bharat 05/08/19 with hypotensive episode overnight. Lactate and blood pressure improved and hemoglobin stable.     -Keep NPO for now as we trend CBC  -Repeat pending at 1200; if stable will restart diet  -MIVF while NPO  -IS  -GI ppx  -Home meds  -Hold chemical DVT ppx; SCDs  -Not ready for discharge

## 2019-05-10 NOTE — NURSING
2100 cbc w/ hgb 6.8, pt mildly symptomatic of anemia w/ pale skin, generalized weakness, hypotension upon ambulation, dizziness and lightheadedness while OOB. Hemodynamically stable, no s/s overt bleeding, abd non-distended and mildly tender around incision sites. Discussed w/ team, will start LR 100ml/hr and obtain consent for blood transfusion, 2 units PC's ordered. Pt verbalizes understanding, no concerns voiced. Will continue to monitor.

## 2019-05-10 NOTE — PLAN OF CARE
Problem: Adult Inpatient Plan of Care  Goal: Plan of Care Review  Outcome: Ongoing (interventions implemented as appropriate)  Pt in agreement with all facets of plan of care including, but not limited to, treatment goals, expectations, discharge criteria, pain management interventions, safety precautions, fall prevention, activity interventions and methods of coping with current illness. No concerns voiced.     See assessment and vital sign flow sheets for complete details of shift. 2 units packed cells administered for blood loss anemia, still no overt s/s gross bleed. Maintaining 1L02 via NC for saturations>91%. Pain well controlled w/ PO medication per MAR. Pt subjectively weak, refuses OOB activity overnight. PIV placed to LUE after 2 previous became inadvertently dislodged. VS remain stable throughout shift. NAD, will continue to monitor.

## 2019-05-10 NOTE — SUBJECTIVE & OBJECTIVE
Interval History: Drop in H/H to less than 7/21 so given 2u PRBCs overnight. Pain much better controlled when she is taking her pain meds. Hemodynamically remains stable.     Medications:  Continuous Infusions:   lactated ringers 100 mL/hr at 05/10/19 0835     Scheduled Meds:   pantoprazole  40 mg Intravenous Daily    simvastatin  20 mg Oral QHS    sodium chloride 0.9%  3 mL Intravenous Q8H     PRN Meds:sodium chloride, acetaminophen, diphenhydrAMINE, hydrALAZINE, HYDROcodone-acetaminophen, HYDROmorphone, ondansetron, oxyCODONE, oxyCODONE     Review of patient's allergies indicates:   Allergen Reactions    Meloxicam Rash     Flushing       Objective:     Vital Signs (Most Recent):  Temp: 98.2 °F (36.8 °C) (05/10/19 0818)  Pulse: 94 (05/10/19 0818)  Resp: 17 (05/10/19 0818)  BP: (!) 164/75 (05/10/19 0818)  SpO2: (!) 92 % (05/10/19 0818) Vital Signs (24h Range):  Temp:  [97.1 °F (36.2 °C)-98.4 °F (36.9 °C)] 98.2 °F (36.8 °C)  Pulse:  [] 94  Resp:  [12-20] 17  SpO2:  [90 %-95 %] 92 %  BP: ()/(44-85) 164/75     Weight: 61 kg (134 lb 7.7 oz)  Body mass index is 23.08 kg/m².    Intake/Output - Last 3 Shifts       05/08 0700 - 05/09 0659 05/09 0700 - 05/10 0659 05/10 0700 - 05/11 0659    P.O.  720     I.V. (mL/kg) 3800 (62.3) 1850 (30.3)     Blood  450 297.5    IV Piggyback 2550 200     Total Intake(mL/kg) 6350 (104.1) 3220 (52.8) 297.5 (4.9)    Urine (mL/kg/hr) 0 (0) 400 (0.3) 650 (4)    Stool   0    Blood 50      Total Output 50 400 650    Net +6300 +2820 -352.5           Urine Occurrence 5 x 2 x     Stool Occurrence   0 x          Physical Exam   Constitutional: She is oriented to person, place, and time. She appears well-developed and well-nourished. No distress.   HENT:   Head: Normocephalic and atraumatic.   Neck: Normal range of motion. Neck supple.   Cardiovascular: Normal rate and regular rhythm.   Pulmonary/Chest: Effort normal and breath sounds normal.   Abdominal: Soft. There is tenderness. No  hernia.       Laparoscopic incisions are clean/dry/intact. RUQ incision with ecchymosis. Appropriate incisional tenderness.   Musculoskeletal: Normal range of motion. She exhibits no edema.   Neurological: She is alert and oriented to person, place, and time.   Skin: Skin is warm and dry. She is not diaphoretic.   Psychiatric: She has a normal mood and affect. Her behavior is normal.       Significant Labs:  CBC:   Recent Labs   Lab 05/10/19  0705   WBC 16.56*   RBC 2.77*   HGB 8.6*   HCT 26.3*      MCV 95   MCH 31.0   MCHC 32.7     CMP:   Recent Labs   Lab 05/10/19  0705   GLU 92   CALCIUM 8.1*   ALBUMIN 2.8*   PROT 5.2*      K 3.8   CO2 22*   *   BUN 17   CREATININE 0.7   ALKPHOS 155*   *   AST 65*   BILITOT 1.7*       Significant Diagnostics:  None

## 2019-05-10 NOTE — HPI
Torie Richard is a pleasant 73 y.o. woman, hx HTN and HLD, who presents to ED for epigastric pain x 3 days.     She has had two episodes of epigastric pain in the past, which resolved spontaneously.  On this occasion, pain has been sustained.  There is bloating associated with it.  It is in the RUQ as well and radiates to the mid back.  There is no nausea and vomiting (unlike the last two episodes).       She is otherwise healthy.  Has had a c section.

## 2019-05-10 NOTE — PROGRESS NOTES
[facility]  General Surgery  Progress Note    Subjective:     History of Present Illness:  Torie Richard is a pleasant 73 y.o. woman, hx HTN and HLD, who presents to ED for epigastric pain x 3 days.     She has had two episodes of epigastric pain in the past, which resolved spontaneously.  On this occasion, pain has been sustained.  There is bloating associated with it.  It is in the RUQ as well and radiates to the mid back.  There is no nausea and vomiting (unlike the last two episodes).       She is otherwise healthy.  Has had a c section.     Post-Op Info:  Procedure(s) (LRB):  CHOLECYSTECTOMY, LAPAROSCOPIC (N/A)   2 Days Post-Op     Interval History: Drop in H/H to less than 7/21 so given 2u PRBCs overnight. Pain much better controlled when she is taking her pain meds. Hemodynamically remains stable.     Medications:  Continuous Infusions:   lactated ringers 100 mL/hr at 05/10/19 0835     Scheduled Meds:   pantoprazole  40 mg Intravenous Daily    simvastatin  20 mg Oral QHS    sodium chloride 0.9%  3 mL Intravenous Q8H     PRN Meds:sodium chloride, acetaminophen, diphenhydrAMINE, hydrALAZINE, HYDROcodone-acetaminophen, HYDROmorphone, ondansetron, oxyCODONE, oxyCODONE     Review of patient's allergies indicates:   Allergen Reactions    Meloxicam Rash     Flushing       Objective:     Vital Signs (Most Recent):  Temp: 98.2 °F (36.8 °C) (05/10/19 0818)  Pulse: 94 (05/10/19 0818)  Resp: 17 (05/10/19 0818)  BP: (!) 164/75 (05/10/19 0818)  SpO2: (!) 92 % (05/10/19 0818) Vital Signs (24h Range):  Temp:  [97.1 °F (36.2 °C)-98.4 °F (36.9 °C)] 98.2 °F (36.8 °C)  Pulse:  [] 94  Resp:  [12-20] 17  SpO2:  [90 %-95 %] 92 %  BP: ()/(44-85) 164/75     Weight: 61 kg (134 lb 7.7 oz)  Body mass index is 23.08 kg/m².    Intake/Output - Last 3 Shifts       05/08 0700 - 05/09 0659 05/09 0700 - 05/10 0659 05/10 0700 - 05/11 0659    P.O.  720     I.V. (mL/kg) 3800 (62.3) 1850 (30.3)     Blood   450 297.5    IV Piggyback 2550 200     Total Intake(mL/kg) 6350 (104.1) 3220 (52.8) 297.5 (4.9)    Urine (mL/kg/hr) 0 (0) 400 (0.3) 650 (4)    Stool   0    Blood 50      Total Output 50 400 650    Net +6300 +2820 -352.5           Urine Occurrence 5 x 2 x     Stool Occurrence   0 x          Physical Exam   Constitutional: She is oriented to person, place, and time. She appears well-developed and well-nourished. No distress.   HENT:   Head: Normocephalic and atraumatic.   Neck: Normal range of motion. Neck supple.   Cardiovascular: Normal rate and regular rhythm.   Pulmonary/Chest: Effort normal and breath sounds normal.   Abdominal: Soft. There is tenderness. No hernia.       Laparoscopic incisions are clean/dry/intact. RUQ incision with ecchymosis. Appropriate incisional tenderness.   Musculoskeletal: Normal range of motion. She exhibits no edema.   Neurological: She is alert and oriented to person, place, and time.   Skin: Skin is warm and dry. She is not diaphoretic.   Psychiatric: She has a normal mood and affect. Her behavior is normal.       Significant Labs:  CBC:   Recent Labs   Lab 05/10/19  0705   WBC 16.56*   RBC 2.77*   HGB 8.6*   HCT 26.3*      MCV 95   MCH 31.0   MCHC 32.7     CMP:   Recent Labs   Lab 05/10/19  0705   GLU 92   CALCIUM 8.1*   ALBUMIN 2.8*   PROT 5.2*      K 3.8   CO2 22*   *   BUN 17   CREATININE 0.7   ALKPHOS 155*   *   AST 65*   BILITOT 1.7*       Significant Diagnostics:  None    Assessment/Plan:     * Choledocholithiasis with obstruction  S/p ERCP and cholecystectomy     Transaminitis  -Resolving    Epigastric abdominal pain  73-year-old female presented with abdominal pain found to have evidence of choledocholithiasis without cholecystitis. Now s/p ERCP with stone removal. Started on IV abx for pus in CBD. Lap bharat 05/08/19 with hypotensive episode overnight. Lactate and blood pressure improved and hemoglobin stable.     -Keep NPO for now as we trend  CBC  -Repeat pending at 1200; if stable will restart diet  -MIVF while NPO  -IS  -GI ppx  -Home meds  -Hold chemical DVT ppx; SCDs  -Not ready for discharge    Essential hypertension, benign  -Losartan    Mixed hyperlipidemia  -Simvastatin        Checo Reyes MD  General Surgery  [facility]

## 2019-05-11 VITALS
OXYGEN SATURATION: 94 % | WEIGHT: 134.5 LBS | SYSTOLIC BLOOD PRESSURE: 172 MMHG | DIASTOLIC BLOOD PRESSURE: 76 MMHG | RESPIRATION RATE: 20 BRPM | HEIGHT: 64 IN | TEMPERATURE: 99 F | BODY MASS INDEX: 22.96 KG/M2 | HEART RATE: 105 BPM

## 2019-05-11 LAB
ALBUMIN SERPL BCP-MCNC: 2.8 G/DL (ref 3.5–5.2)
ALP SERPL-CCNC: 158 U/L (ref 55–135)
ALT SERPL W/O P-5'-P-CCNC: 166 U/L (ref 10–44)
ANION GAP SERPL CALC-SCNC: 7 MMOL/L (ref 8–16)
APTT BLDCRRT: 23.6 SEC (ref 21–32)
AST SERPL-CCNC: 55 U/L (ref 10–40)
BASOPHILS # BLD AUTO: 0.03 K/UL (ref 0–0.2)
BASOPHILS # BLD AUTO: 0.04 K/UL (ref 0–0.2)
BASOPHILS NFR BLD: 0.3 % (ref 0–1.9)
BASOPHILS NFR BLD: 0.4 % (ref 0–1.9)
BILIRUB SERPL-MCNC: 1.1 MG/DL (ref 0.1–1)
BUN SERPL-MCNC: 11 MG/DL (ref 8–23)
CALCIUM SERPL-MCNC: 8.4 MG/DL (ref 8.7–10.5)
CHLORIDE SERPL-SCNC: 107 MMOL/L (ref 95–110)
CO2 SERPL-SCNC: 27 MMOL/L (ref 23–29)
CREAT SERPL-MCNC: 0.6 MG/DL (ref 0.5–1.4)
DIFFERENTIAL METHOD: ABNORMAL
DIFFERENTIAL METHOD: ABNORMAL
EOSINOPHIL # BLD AUTO: 0 K/UL (ref 0–0.5)
EOSINOPHIL # BLD AUTO: 0 K/UL (ref 0–0.5)
EOSINOPHIL NFR BLD: 0.3 % (ref 0–8)
EOSINOPHIL NFR BLD: 0.3 % (ref 0–8)
ERYTHROCYTE [DISTWIDTH] IN BLOOD BY AUTOMATED COUNT: 14 % (ref 11.5–14.5)
ERYTHROCYTE [DISTWIDTH] IN BLOOD BY AUTOMATED COUNT: 14.3 % (ref 11.5–14.5)
EST. GFR  (AFRICAN AMERICAN): >60 ML/MIN/1.73 M^2
EST. GFR  (NON AFRICAN AMERICAN): >60 ML/MIN/1.73 M^2
FIBRINOGEN PPP-MCNC: 424 MG/DL (ref 182–366)
GLUCOSE SERPL-MCNC: 92 MG/DL (ref 70–110)
HCT VFR BLD AUTO: 28.1 % (ref 37–48.5)
HCT VFR BLD AUTO: 28.9 % (ref 37–48.5)
HGB BLD-MCNC: 9.6 G/DL (ref 12–16)
HGB BLD-MCNC: 9.8 G/DL (ref 12–16)
IMM GRANULOCYTES # BLD AUTO: 0.13 K/UL (ref 0–0.04)
IMM GRANULOCYTES # BLD AUTO: 0.17 K/UL (ref 0–0.04)
IMM GRANULOCYTES NFR BLD AUTO: 1.2 % (ref 0–0.5)
IMM GRANULOCYTES NFR BLD AUTO: 1.9 % (ref 0–0.5)
INR PPP: 0.9 (ref 0.8–1.2)
LYMPHOCYTES # BLD AUTO: 1.4 K/UL (ref 1–4.8)
LYMPHOCYTES # BLD AUTO: 1.7 K/UL (ref 1–4.8)
LYMPHOCYTES NFR BLD: 13.3 % (ref 18–48)
LYMPHOCYTES NFR BLD: 19.6 % (ref 18–48)
MAGNESIUM SERPL-MCNC: 1.8 MG/DL (ref 1.6–2.6)
MCH RBC QN AUTO: 31.1 PG (ref 27–31)
MCH RBC QN AUTO: 31.4 PG (ref 27–31)
MCHC RBC AUTO-ENTMCNC: 33.9 G/DL (ref 32–36)
MCHC RBC AUTO-ENTMCNC: 34.2 G/DL (ref 32–36)
MCV RBC AUTO: 92 FL (ref 82–98)
MCV RBC AUTO: 92 FL (ref 82–98)
MONOCYTES # BLD AUTO: 0.7 K/UL (ref 0.3–1)
MONOCYTES # BLD AUTO: 0.7 K/UL (ref 0.3–1)
MONOCYTES NFR BLD: 7.1 % (ref 4–15)
MONOCYTES NFR BLD: 7.4 % (ref 4–15)
NEUTROPHILS # BLD AUTO: 6.2 K/UL (ref 1.8–7.7)
NEUTROPHILS # BLD AUTO: 8.1 K/UL (ref 1.8–7.7)
NEUTROPHILS NFR BLD: 70.5 % (ref 38–73)
NEUTROPHILS NFR BLD: 77.7 % (ref 38–73)
NRBC BLD-RTO: 0 /100 WBC
NRBC BLD-RTO: 1 /100 WBC
PHOSPHATE SERPL-MCNC: 2.6 MG/DL (ref 2.7–4.5)
PLATELET # BLD AUTO: 160 K/UL (ref 150–350)
PLATELET # BLD AUTO: 200 K/UL (ref 150–350)
PMV BLD AUTO: 11.2 FL (ref 9.2–12.9)
PMV BLD AUTO: 9.4 FL (ref 9.2–12.9)
POTASSIUM SERPL-SCNC: 3.4 MMOL/L (ref 3.5–5.1)
PROT SERPL-MCNC: 5.3 G/DL (ref 6–8.4)
PROTHROMBIN TIME: 9.6 SEC (ref 9–12.5)
RBC # BLD AUTO: 3.06 M/UL (ref 4–5.4)
RBC # BLD AUTO: 3.15 M/UL (ref 4–5.4)
SODIUM SERPL-SCNC: 141 MMOL/L (ref 136–145)
WBC # BLD AUTO: 10.45 K/UL (ref 3.9–12.7)
WBC # BLD AUTO: 8.84 K/UL (ref 3.9–12.7)

## 2019-05-11 PROCEDURE — 85610 PROTHROMBIN TIME: CPT

## 2019-05-11 PROCEDURE — 94799 UNLISTED PULMONARY SVC/PX: CPT

## 2019-05-11 PROCEDURE — 94664 DEMO&/EVAL PT USE INHALER: CPT

## 2019-05-11 PROCEDURE — 27000221 HC OXYGEN, UP TO 24 HOURS

## 2019-05-11 PROCEDURE — 80053 COMPREHEN METABOLIC PANEL: CPT

## 2019-05-11 PROCEDURE — 83735 ASSAY OF MAGNESIUM: CPT

## 2019-05-11 PROCEDURE — 36415 COLL VENOUS BLD VENIPUNCTURE: CPT

## 2019-05-11 PROCEDURE — 25000003 PHARM REV CODE 250: Performed by: STUDENT IN AN ORGANIZED HEALTH CARE EDUCATION/TRAINING PROGRAM

## 2019-05-11 PROCEDURE — 25000003 PHARM REV CODE 250: Performed by: SURGERY

## 2019-05-11 PROCEDURE — 63600175 PHARM REV CODE 636 W HCPCS: Performed by: STUDENT IN AN ORGANIZED HEALTH CARE EDUCATION/TRAINING PROGRAM

## 2019-05-11 PROCEDURE — C9113 INJ PANTOPRAZOLE SODIUM, VIA: HCPCS | Performed by: STUDENT IN AN ORGANIZED HEALTH CARE EDUCATION/TRAINING PROGRAM

## 2019-05-11 PROCEDURE — 94761 N-INVAS EAR/PLS OXIMETRY MLT: CPT

## 2019-05-11 PROCEDURE — 85730 THROMBOPLASTIN TIME PARTIAL: CPT

## 2019-05-11 PROCEDURE — 84100 ASSAY OF PHOSPHORUS: CPT

## 2019-05-11 PROCEDURE — 85384 FIBRINOGEN ACTIVITY: CPT

## 2019-05-11 PROCEDURE — 94640 AIRWAY INHALATION TREATMENT: CPT

## 2019-05-11 PROCEDURE — 99900035 HC TECH TIME PER 15 MIN (STAT)

## 2019-05-11 PROCEDURE — 85025 COMPLETE CBC W/AUTO DIFF WBC: CPT

## 2019-05-11 PROCEDURE — 25000242 PHARM REV CODE 250 ALT 637 W/ HCPCS: Performed by: STUDENT IN AN ORGANIZED HEALTH CARE EDUCATION/TRAINING PROGRAM

## 2019-05-11 RX ORDER — SODIUM,POTASSIUM PHOSPHATES 280-250MG
2 POWDER IN PACKET (EA) ORAL ONCE
Status: COMPLETED | OUTPATIENT
Start: 2019-05-11 | End: 2019-05-11

## 2019-05-11 RX ORDER — LABETALOL HCL 20 MG/4 ML
10 SYRINGE (ML) INTRAVENOUS ONCE
Status: COMPLETED | OUTPATIENT
Start: 2019-05-11 | End: 2019-05-11

## 2019-05-11 RX ORDER — POLYETHYLENE GLYCOL 3350 17 G/17G
17 POWDER, FOR SOLUTION ORAL DAILY PRN
Refills: 0
Start: 2019-05-11 | End: 2019-05-23

## 2019-05-11 RX ORDER — OXYCODONE AND ACETAMINOPHEN 5; 325 MG/1; MG/1
1 TABLET ORAL
Qty: 21 TABLET | Refills: 0 | Status: SHIPPED | OUTPATIENT
Start: 2019-05-11 | End: 2019-05-23

## 2019-05-11 RX ORDER — LEVALBUTEROL INHALATION SOLUTION 0.63 MG/3ML
0.63 SOLUTION RESPIRATORY (INHALATION) EVERY 8 HOURS
Status: DISCONTINUED | OUTPATIENT
Start: 2019-05-11 | End: 2019-05-11 | Stop reason: HOSPADM

## 2019-05-11 RX ADMIN — PANTOPRAZOLE SODIUM 40 MG: 40 INJECTION, POWDER, LYOPHILIZED, FOR SOLUTION INTRAVENOUS at 08:05

## 2019-05-11 RX ADMIN — ALBUTEROL SULFATE 2.5 MG: 2.5 SOLUTION RESPIRATORY (INHALATION) at 12:05

## 2019-05-11 RX ADMIN — LABETALOL HCL IV SOLN PREFILLED SYRINGE 20 MG/4ML (5 MG/ML) 10 MG: 20/4 SOLUTION PREFILLED SYRINGE at 08:05

## 2019-05-11 RX ADMIN — LOSARTAN POTASSIUM 25 MG: 25 TABLET, FILM COATED ORAL at 08:05

## 2019-05-11 RX ADMIN — POTASSIUM & SODIUM PHOSPHATES POWDER PACK 280-160-250 MG 2 PACKET: 280-160-250 PACK at 08:05

## 2019-05-11 RX ADMIN — LEVALBUTEROL HYDROCHLORIDE 0.63 MG: 0.63 SOLUTION RESPIRATORY (INHALATION) at 03:05

## 2019-05-11 RX ADMIN — ALBUTEROL SULFATE 2.5 MG: 2.5 SOLUTION RESPIRATORY (INHALATION) at 01:05

## 2019-05-11 RX ADMIN — ALBUTEROL SULFATE 2.5 MG: 2.5 SOLUTION RESPIRATORY (INHALATION) at 08:05

## 2019-05-11 RX ADMIN — OXYCODONE HYDROCHLORIDE 10 MG: 10 TABLET ORAL at 08:05

## 2019-05-11 NOTE — PLAN OF CARE
Problem: Adult Inpatient Plan of Care  Goal: Plan of Care Review  Outcome: Ongoing (interventions implemented as appropriate)  No acute changes overnight; VSS ; pain med x 1 dose; pt started back on home BP meds- SBPs elevated , NSR on the monitor, O2 @ 2/L min NC - sats > 95 %- lungs clear; tolerating diet; independent with ambulating to bedside commode ; instructed on POC and progression toward goals; continue to monitor.

## 2019-05-11 NOTE — ASSESSMENT & PLAN NOTE
73-year-old female presented with abdominal pain found to have evidence of choledocholithiasis without cholecystitis. Now s/p ERCP with stone removal. Started on IV abx for pus in CBD. Lap bharat 05/08/19 with hypotensive episode overnight. Lactate and blood pressure improved and hemoglobin stable.     -Regular diet  -Repeat pending at 1200; if stable will discharge home  -MIVF while NPO  -IS  -GI ppx  -Home meds  -Hold chemical DVT ppx; SCDs

## 2019-05-11 NOTE — PLAN OF CARE
Problem: Adult Inpatient Plan of Care  Goal: Plan of Care Review  Outcome: Revised  POC reviewed with patient who verbalized understanding. VSS on 2 L NC, breathing treatments ordered. AAOX4. Remains free of falls and injury. Patient sat up in chair a couple of times today. Patient up to bedside commode X1 assist. HOB maintained > 30.     PRBC X 2 finished this AM, post CBC drawn.    Lap sites KENRICK w/ dermabond. Abdomen soft. Pt states she is passing gas, denies BM.    Tolerating regular diet, denies nausea. Pain controlled with PRN medications per MAR - heat packs added. Telemetry being monitored running NSR/ sinus tachy. Educated and encouraged IS use. Patient denies chest pain & SOB. TEDS and SCDS in place. No acute events. No distress noted. Bed in lowest position, call light within reach, frequent rounds made for safety.

## 2019-05-11 NOTE — PROGRESS NOTES
[facility]  General Surgery  Progress Note    Subjective:     History of Present Illness:  Torie Richard is a pleasant 73 y.o. woman, hx HTN and HLD, who presents to ED for epigastric pain x 3 days.     She has had two episodes of epigastric pain in the past, which resolved spontaneously.  On this occasion, pain has been sustained.  There is bloating associated with it.  It is in the RUQ as well and radiates to the mid back.  There is no nausea and vomiting (unlike the last two episodes).       She is otherwise healthy.  Has had a c section.     Post-Op Info:  Procedure(s) (LRB):  CHOLECYSTECTOMY, LAPAROSCOPIC (N/A)   3 Days Post-Op     Interval History: No acute events. H/H stable post transfusion. Orthostatic changes improved; pt amble to ambulate on her own. Otherwise stable; tolerating diet; pain controlled.     Medications:  Continuous Infusions:  Scheduled Meds:   albuterol sulfate  2.5 mg Nebulization Q6H    losartan  25 mg Oral Daily    pantoprazole  40 mg Intravenous Daily    simvastatin  20 mg Oral QHS    sodium chloride 0.9%  3 mL Intravenous Q8H     PRN Meds:sodium chloride, acetaminophen, diphenhydrAMINE, hydrALAZINE, HYDROmorphone, ondansetron, oxyCODONE, oxyCODONE     Review of patient's allergies indicates:   Allergen Reactions    Meloxicam Rash     Flushing       Objective:     Vital Signs (Most Recent):  Temp: 98.1 °F (36.7 °C) (05/11/19 0726)  Pulse: 86 (05/11/19 1055)  Resp: 18 (05/11/19 0900)  BP: (!) 197/93 (05/11/19 0726)  SpO2: 95 % (05/11/19 0900) Vital Signs (24h Range):  Temp:  [98.1 °F (36.7 °C)-98.8 °F (37.1 °C)] 98.1 °F (36.7 °C)  Pulse:  [] 86  Resp:  [14-20] 18  SpO2:  [90 %-99 %] 95 %  BP: (153-202)/(70-93) 197/93     Weight: 61 kg (134 lb 7.7 oz)  Body mass index is 23.08 kg/m².    Intake/Output - Last 3 Shifts       05/09 0700 - 05/10 0659 05/10 0700 - 05/11 0659 05/11 0700 - 05/12 0659    P.O. 720 150 240    I.V. (mL/kg) 1850 (30.3)      Blood  450 297.5     IV Piggyback 200      Total Intake(mL/kg) 3220 (52.8) 447.5 (7.3) 240 (3.9)    Urine (mL/kg/hr) 400 (0.3) 2650 (1.8)     Emesis/NG output  0     Stool  0     Blood       Total Output 400 2650     Net +2820 -2202.5 +240           Urine Occurrence 2 x 0 x     Stool Occurrence  0 x     Emesis Occurrence  0 x           Physical Exam   Constitutional: She is oriented to person, place, and time. She appears well-developed and well-nourished. No distress.   HENT:   Head: Normocephalic and atraumatic.   Neck: Normal range of motion. Neck supple.   Cardiovascular: Normal rate and regular rhythm.   Pulmonary/Chest: Effort normal and breath sounds normal.   Abdominal: Soft. There is tenderness. No hernia.       Laparoscopic incisions are clean/dry/intact. RUQ incision with ecchymosis. Appropriate incisional tenderness.   Musculoskeletal: Normal range of motion. She exhibits no edema.   Neurological: She is alert and oriented to person, place, and time.   Skin: Skin is warm and dry. She is not diaphoretic.   Psychiatric: She has a normal mood and affect. Her behavior is normal.       Significant Labs:  CBC:   Recent Labs   Lab 05/11/19  0517   WBC 8.84   RBC 3.06*   HGB 9.6*   HCT 28.1*      MCV 92   MCH 31.4*   MCHC 34.2     CMP:   Recent Labs   Lab 05/11/19  0517   GLU 92   CALCIUM 8.4*   ALBUMIN 2.8*   PROT 5.3*      K 3.4*   CO2 27      BUN 11   CREATININE 0.6   ALKPHOS 158*   *   AST 55*   BILITOT 1.1*       Significant Diagnostics:  N/A    Assessment/Plan:     * Choledocholithiasis with obstruction  S/p ERCP and cholecystectomy     Transaminitis  -Resolving    Epigastric abdominal pain  73-year-old female presented with abdominal pain found to have evidence of choledocholithiasis without cholecystitis. Now s/p ERCP with stone removal. Started on IV abx for pus in CBD. Lap bharat 05/08/19 with hypotensive episode overnight. Lactate and blood pressure improved and hemoglobin stable.      -Regular diet  -Repeat pending at 1200; if stable will discharge home  -MIVF while NPO  -IS  -GI ppx  -Home meds  -Hold chemical DVT ppx; SCDs      Essential hypertension, benign  -Losartan    Mixed hyperlipidemia  -Simvastatin        Ian Becerra MD  General Surgery  [facility]

## 2019-05-11 NOTE — DISCHARGE SUMMARY
[facility]  General Surgery  Discharge Summary      Patient Name: Torie Richard  MRN: [id number]  Admission Date: 5/5/2019  Hospital Length of Stay: 6 days  Discharge Date and Time:  05/11/2019 12:52 PM  Attending Physician: Valerie Guadarrama MD   Discharging Provider: Ian Becerra MD  Primary Care Provider: Bartolo Burciaga MD     Procedure(s) (LRB):  CHOLECYSTECTOMY, LAPAROSCOPIC (N/A)     Hospital Course: Patient presented on 5/5 with choledocholithiasis and underwent ERCP 5/6, followed by lap bharat 5/8. Patient tolerated her procedures well; however, she experienced postoperative bleeding requiring red blood cell transfusion following her lap bharat. After transfusion, however, her blood counts remained stable. She tolerated a regular diet, ambulated, had return of bowel function, and was deemed safe for discharge home on 5/11. Please see full medical record for further details.     Consults:   Consults (From admission, onward)        Status Ordering Provider     Inpatient consult to [facility]  Once     Provider:  (Not yet assigned)    Completed HAYDER DRUMMOND     Inpatient consult to General surgery  Once     Provider:  (Not yet assigned)    Completed CATRACHO BERNARDO     Inpatient consult to Midline team  Once     Provider:  (Not yet assigned)    Completed VALERIE GUADARRAMA          Significant Diagnostic Studies: Please see full medical record.     Pending Diagnostic Studies:     Procedure Component Value Units Date/Time    FL ERCP Biliary And Pancreatic [[id number]] Resulted:  05/06/19 1320    Order Status:  Sent Lab Status:  In process Updated:  05/06/19 1427        Final Active Diagnoses:    Diagnosis Date Noted POA    PRINCIPAL PROBLEM:  Choledocholithiasis with obstruction [K80.51] 05/07/2019 Yes    Transaminitis [R74.0]  Yes    Epigastric abdominal pain [R10.13] 05/05/2019 Yes    Essential hypertension, benign [I10] 03/08/2019 Yes    Mixed hyperlipidemia  [E78.2] 10/18/2012 Yes     Chronic      Problems Resolved During this Admission:    Diagnosis Date Noted Date Resolved POA    Biliary obstruction [K83.1] 05/06/2019 05/10/2019 Yes    Abnormal US (ultrasound) of abdomen [R93.5]  05/08/2019 Yes      Discharged Condition: good    Disposition: Home or Self Care    Follow Up:  Follow-up Information     Tayo Guadrarama MD In 2 weeks.    Specialty:  General Surgery  Why:  For postoperative follow up.  Contact information:  [address]  [facility] [address] [phone]                 Patient Instructions:      Notify your health care provider if you experience any of the following:  temperature >100.4     Notify your health care provider if you experience any of the following:  persistent nausea and vomiting or diarrhea     Notify your health care provider if you experience any of the following:  severe uncontrolled pain     Notify your health care provider if you experience any of the following:  redness, tenderness, or signs of infection (pain, swelling, redness, odor or green/yellow discharge around incision site)     Notify your health care provider if you experience any of the following:  difficulty breathing or increased cough     Notify your health care provider if you experience any of the following:  severe persistent headache     Notify your health care provider if you experience any of the following:  worsening rash     Notify your health care provider if you experience any of the following:  persistent dizziness, light-headedness, or visual disturbances     Notify your health care provider if you experience any of the following:  increased confusion or weakness     No dressing needed     Activity as tolerated     Medications:  Reconciled Home Medications:      Medication List      START taking these medications    oxyCODONE-acetaminophen 5-325 mg per tablet  Commonly known as:  PERCOCET  Take 1 tablet by mouth every 4 to 6 hours as needed for  Pain.     polyethylene glycol 17 gram Pwpk  Commonly known as:  GLYCOLAX  Take 17 g by mouth daily as needed (constipation).        CONTINUE taking these medications    acetaminophen 500 MG tablet  Commonly known as:  TYLENOL  Take 500 mg by mouth every 6 (six) hours as needed for Pain.     bismuth subsalicylate 262 mg/15 mL suspension  Commonly known as:  PEPTO BISMOL  Take 15 mLs by mouth as needed for Indigestion.     calcium carbonate 200 mg calcium (500 mg) chewable tablet  Commonly known as:  TUMS  Take 1 tablet by mouth as needed for Heartburn.     ibuprofen 200 MG tablet  Commonly known as:  ADVIL,MOTRIN  Take 200 mg by mouth every 6 (six) hours as needed.     losartan 25 MG tablet  Commonly known as:  COZAAR  Take 1 tablet (25 mg total) by mouth once daily.     melatonin 5 mg Tab  Take 5 mg by mouth.     naproxen sodium 220 MG tablet  Commonly known as:  ANAPROX     simvastatin 20 MG tablet  Commonly known as:  ZOCOR  TAKE 1 TABLET (20 MG TOTAL) BY MOUTH EVERY EVENING.     SLEEP AID (DOXYLAMINE) 25 mg tablet  Generic drug:  doxylamine succinate  Take 25 mg by mouth nightly as needed.            Ian Becerra MD  General Surgery  [facility]

## 2019-05-11 NOTE — SUBJECTIVE & OBJECTIVE
Interval History: No acute events. H/H stable post transfusion. Orthostatic changes improved; pt amble to ambulate on her own. Otherwise stable; tolerating diet; pain controlled.     Medications:  Continuous Infusions:  Scheduled Meds:   albuterol sulfate  2.5 mg Nebulization Q6H    losartan  25 mg Oral Daily    pantoprazole  40 mg Intravenous Daily    simvastatin  20 mg Oral QHS    sodium chloride 0.9%  3 mL Intravenous Q8H     PRN Meds:sodium chloride, acetaminophen, diphenhydrAMINE, hydrALAZINE, HYDROmorphone, ondansetron, oxyCODONE, oxyCODONE     Review of patient's allergies indicates:   Allergen Reactions    Meloxicam Rash     Flushing       Objective:     Vital Signs (Most Recent):  Temp: 98.1 °F (36.7 °C) (05/11/19 0726)  Pulse: 86 (05/11/19 1055)  Resp: 18 (05/11/19 0900)  BP: (!) 197/93 (05/11/19 0726)  SpO2: 95 % (05/11/19 0900) Vital Signs (24h Range):  Temp:  [98.1 °F (36.7 °C)-98.8 °F (37.1 °C)] 98.1 °F (36.7 °C)  Pulse:  [] 86  Resp:  [14-20] 18  SpO2:  [90 %-99 %] 95 %  BP: (153-202)/(70-93) 197/93     Weight: 61 kg (134 lb 7.7 oz)  Body mass index is 23.08 kg/m².    Intake/Output - Last 3 Shifts       05/09 0700 - 05/10 0659 05/10 0700 - 05/11 0659 05/11 0700 - 05/12 0659    P.O. 720 150 240    I.V. (mL/kg) 1850 (30.3)      Blood 450 297.5     IV Piggyback 200      Total Intake(mL/kg) 3220 (52.8) 447.5 (7.3) 240 (3.9)    Urine (mL/kg/hr) 400 (0.3) 2650 (1.8)     Emesis/NG output  0     Stool  0     Blood       Total Output 400 2650     Net +2820 -2202.5 +240           Urine Occurrence 2 x 0 x     Stool Occurrence  0 x     Emesis Occurrence  0 x           Physical Exam   Constitutional: She is oriented to person, place, and time. She appears well-developed and well-nourished. No distress.   HENT:   Head: Normocephalic and atraumatic.   Neck: Normal range of motion. Neck supple.   Cardiovascular: Normal rate and regular rhythm.   Pulmonary/Chest: Effort normal and breath sounds normal.    Abdominal: Soft. There is tenderness. No hernia.       Laparoscopic incisions are clean/dry/intact. RUQ incision with ecchymosis. Appropriate incisional tenderness.   Musculoskeletal: Normal range of motion. She exhibits no edema.   Neurological: She is alert and oriented to person, place, and time.   Skin: Skin is warm and dry. She is not diaphoretic.   Psychiatric: She has a normal mood and affect. Her behavior is normal.       Significant Labs:  CBC:   Recent Labs   Lab 05/11/19  0517   WBC 8.84   RBC 3.06*   HGB 9.6*   HCT 28.1*      MCV 92   MCH 31.4*   MCHC 34.2     CMP:   Recent Labs   Lab 05/11/19  0517   GLU 92   CALCIUM 8.4*   ALBUMIN 2.8*   PROT 5.3*      K 3.4*   CO2 27      BUN 11   CREATININE 0.6   ALKPHOS 158*   *   AST 55*   BILITOT 1.1*       Significant Diagnostics:  N/A

## 2019-05-11 NOTE — NURSING
Discharge instructions, medications, and follow up appointments complete with no further questions at this time. Instructed to schedule follow-up appointment with Dr. Guadarrama in 2 weeks. Printed prescription for oral Percocet handed to patient. Pt changing clothes for discharge home with family. O2sats >94%. No acute distress or needs at this time. Wheel chair requested for transportation to hospital front exit.

## 2019-05-11 NOTE — NURSING
Oxygen walk test. O2 sats 94% on RA.  Sats decreased to 91% while walking on RA. Upon return to room after ambulating 150 ft, O2 sats >94% within 10-20 seconds in sitting position on RA. MD aware and ok to discharge home with family.

## 2019-05-13 ENCOUNTER — PATIENT MESSAGE (OUTPATIENT)
Dept: SURGERY | Facility: CLINIC | Age: 74
End: 2019-05-13

## 2019-05-13 ENCOUNTER — PATIENT MESSAGE (OUTPATIENT)
Dept: INTERNAL MEDICINE | Facility: CLINIC | Age: 74
End: 2019-05-13

## 2019-05-14 ENCOUNTER — PATIENT OUTREACH (OUTPATIENT)
Dept: ADMINISTRATIVE | Facility: CLINIC | Age: 74
End: 2019-05-14

## 2019-05-14 ENCOUNTER — TELEPHONE (OUTPATIENT)
Dept: GASTROENTEROLOGY | Facility: CLINIC | Age: 74
End: 2019-05-14

## 2019-05-14 ENCOUNTER — TELEPHONE (OUTPATIENT)
Dept: ENDOSCOPY | Facility: HOSPITAL | Age: 74
End: 2019-05-14

## 2019-05-14 NOTE — PATIENT INSTRUCTIONS
Discharge Instructions for Laparoscopic Cholecystectomy  You have had a procedure known as a laparoscopic cholecystectomy. A laparoscopic cholecystectomy is a procedure to remove your gallbladder. People who have this procedure usually recover more quickly and have less pain than with open gallbladder surgery (called open cholecystectomy). Many surgeons recommend a low-fat diet, avoiding fried food in particular, for the first month after surgery.   You can live a full and healthy life without your gallbladder. This includes eating the foods and doing the things you enjoyed before your gallbladder problems started.  Home care  Recommendations for home care include the following:   · Ask someone to drive you to your appointments for the next 3 days. Dont drive until you are no longer taking pain medicine and are able to step on the brake pedal without hesitation.   · Wash the skin around your incision daily with mild soap and water. It's OK to shower the day after your surgery.  · Eat your regular diet. It is wise to stay away from rich, greasy, or spicy food for a few days.  · Remember, it takes at least 1 week for you to get most of your strength and energy back.  · Make an office visit to talk to your healthcare provider if the following symptoms dont go away within a week after your surgery:  ¨ Fatigue  ¨ Pain around the incision  ¨ Diarrhea or constipation  ¨ Loss of appetite     When to call your healthcare provider  Call your healthcare provider immediately if you have any of the following:  · Yellowing of your eyes or skin (jaundice)  · Chills  · Fever of 100.4°F (38.0°C) or higher, or as directed by your healthcare provider   · Redness, swelling, increasing pain, pus, or a foul smell at the incision site  · Dark or rust-colored urine  · Stool that is lamont-colored or light in color instead of brown  · Increasing belly pain  · Rectal bleeding  · Leg swelling or shortness of breath   Date Last Reviewed:  7/1/2016  © 4247-1877 [employer]. [address] [place] [address], [place] [address]. All rights reserved. This information is not intended as a substitute for professional medical care. Always follow your healthcare professional's instructions.

## 2019-05-14 NOTE — PHYSICIAN QUERY
PT Name: Torie Richard  MR #: [id number]     Physician Query Form - Documentation Clarification      CDS: Los GREENFIELD,RN        Contact information:[phone]    This form is a permanent document in the medical record.     Query Date: May 14, 2019    By submitting this query, we are merely seeking further clarification of documentation. Please utilize your independent clinical judgment when addressing the question(s) below.    The Medical record reflects the following:    Supporting Clinical Findings Location in Medical Record   Choledocholithiasis with obstruction  S/p ERCP and cholecystectomy     Epigastric abdominal pain  73-year-old female presented with abdominal pain found to have evidence of choledocholithiasis without cholecystitis. Now s/p ERCP with stone removal. Started on IV abx for pus in CBD. Lap bharat 05/08/19 with hypotensive episode overnight. Lactate and blood pressure improved and hemoglobin stable.                  5/11/19 General Surgery Progress Notes   FINAL PATHOLOGIC DIAGNOSIS  Gallbladder, cholecystectomy:  - Chronic cholecystitis.  - Cholelithiasis.  - Negative for malignancy.    Diagnosed           5/14/19 Surgical Pathology Report                                                                            Doctor, Please specify diagnosis or diagnoses associated with above clinical findings.    Please clarify the conflicting documentation of Calculus of Gallbladder and Bile Duct with Cholecystitis   with Obstruction :    Provider Use Only       ___Calculus of Gallbladder and Bile Duct with Chronic Cholecystitis with Obstruction     _x__Calculus of Gallbladder and Bile Duct without Chronic Cholecystitis with Obstruction     ___ Other ( Please Specify)_________________________________________                                                                                                                  [  ] Clinically Undetermined

## 2019-05-14 NOTE — TELEPHONE ENCOUNTER
----- Message from Patricia Lnych sent at 5/14/2019 12:47 PM CDT -----  Contact: pt#[phone]  Patient Returning Call from [facility]    Who Left Message for Patient:Trinidad   Communication Preference:call  Additional Information:

## 2019-05-15 ENCOUNTER — PATIENT MESSAGE (OUTPATIENT)
Dept: INTERNAL MEDICINE | Facility: CLINIC | Age: 74
End: 2019-05-15

## 2019-05-16 ENCOUNTER — NURSE TRIAGE (OUTPATIENT)
Dept: ADMINISTRATIVE | Facility: CLINIC | Age: 74
End: 2019-05-16

## 2019-05-16 ENCOUNTER — TELEPHONE (OUTPATIENT)
Dept: SURGERY | Facility: CLINIC | Age: 74
End: 2019-05-16

## 2019-05-16 NOTE — TELEPHONE ENCOUNTER
Returned call.  Patient concerned that she still has some shortness of breath on exertion and this AM states she felt her abdomen was larger. Does not feel she is in distress but concerned and would feel better if she was seen in our office.  Did discuss situation with Dr. Reyes.  Triage nurse made her an appointment with IM tomorrow. Will discuss with Dr. Guadarrama's nurse in AM whether she should come to General surgery or keep her appointment with IM.  Verbalized understanding and will also call back in AM

## 2019-05-16 NOTE — TELEPHONE ENCOUNTER
Cholecystectomy on 5/8.  Pt had some internal bleeding after the procedure, received 2 units prbc's prior to discharge.  She reports she is still getting mildly sob,  with minimal exertion.  She states not worse, just thought sh'ed be back to normal by now.  Her abdomen is a little swollen, but not sure if that's normal, a little tender to the upper abdomen, which  resolves with rest and heating pad.  No problems passing gas and normal daily bm's since the procedure.    Reason for Disposition   [1] Caller has URGENT question AND [2] triager unable to answer question    Protocols used: POST-OP SYMPTOMS AND [id number]

## 2019-05-16 NOTE — TELEPHONE ENCOUNTER
----- Message from Clarisse Martinez sent at 5/16/2019  3:26 PM CDT -----  Patient Returning Call from [facility]     Who Left Message for Patient: kavita Ortiz    Communication Preference: [phone]     Additional Information:

## 2019-05-17 ENCOUNTER — LAB VISIT (OUTPATIENT)
Dept: LAB | Facility: HOSPITAL | Age: 74
End: 2019-05-17
Attending: SURGERY
Payer: MEDICARE

## 2019-05-17 ENCOUNTER — TELEPHONE (OUTPATIENT)
Dept: SURGERY | Facility: CLINIC | Age: 74
End: 2019-05-17

## 2019-05-17 DIAGNOSIS — Z90.49 S/P LAPAROSCOPIC CHOLECYSTECTOMY: Primary | ICD-10-CM

## 2019-05-17 DIAGNOSIS — Z90.49 S/P LAPAROSCOPIC CHOLECYSTECTOMY: ICD-10-CM

## 2019-05-17 LAB
BASOPHILS # BLD AUTO: 0.03 K/UL (ref 0–0.2)
BASOPHILS NFR BLD: 0.3 % (ref 0–1.9)
DIFFERENTIAL METHOD: ABNORMAL
EOSINOPHIL # BLD AUTO: 0.8 K/UL (ref 0–0.5)
EOSINOPHIL NFR BLD: 8.7 % (ref 0–8)
ERYTHROCYTE [DISTWIDTH] IN BLOOD BY AUTOMATED COUNT: 14.2 % (ref 11.5–14.5)
HCT VFR BLD AUTO: 37.6 % (ref 37–48.5)
HGB BLD-MCNC: 11.9 G/DL (ref 12–16)
LYMPHOCYTES # BLD AUTO: 1.4 K/UL (ref 1–4.8)
LYMPHOCYTES NFR BLD: 15.3 % (ref 18–48)
MCH RBC QN AUTO: 31 PG (ref 27–31)
MCHC RBC AUTO-ENTMCNC: 31.6 G/DL (ref 32–36)
MCV RBC AUTO: 98 FL (ref 82–98)
MONOCYTES # BLD AUTO: 0.9 K/UL (ref 0.3–1)
MONOCYTES NFR BLD: 10 % (ref 4–15)
NEUTROPHILS # BLD AUTO: 5.7 K/UL (ref 1.8–7.7)
NEUTROPHILS NFR BLD: 64.8 % (ref 38–73)
PLATELET # BLD AUTO: 406 K/UL (ref 150–350)
PMV BLD AUTO: 10.6 FL (ref 9.2–12.9)
RBC # BLD AUTO: 3.84 M/UL (ref 4–5.4)
WBC # BLD AUTO: 8.81 K/UL (ref 3.9–12.7)

## 2019-05-17 PROCEDURE — 36415 COLL VENOUS BLD VENIPUNCTURE: CPT

## 2019-05-17 PROCEDURE — 85025 COMPLETE CBC W/AUTO DIFF WBC: CPT

## 2019-05-17 RX ORDER — FERROUS SULFATE 324(65)MG
325 TABLET, DELAYED RELEASE (ENTERIC COATED) ORAL DAILY
COMMUNITY
End: 2019-05-24

## 2019-05-17 NOTE — TELEPHONE ENCOUNTER
Pt called as a f/u to a call yesterday concerning her shortness of breath.  She is s/p Lap Ivana from 5/8/19 with post operative blood transfusion.  Today she is only slightly short of breath with exertion, denies headache, dizziness, racing heart, or abdominal pain.  She feels better today.  Appt with Internal Medicine cancelled for today.  She will f/u as scheduled on 5/23/19 and will call back with increased SOB, fatigue, dizziness, headache, abdominal pain, or increased heart rate/racing heart.  She verbalized understanding.

## 2019-05-17 NOTE — TELEPHONE ENCOUNTER
Spoke with patient regarding CBC to be drawn today.  Also, per Dr. Reyes, she should start taking OTC Iron 65mg Tabs as well as stool softeners.  Appt scheduled for 3pm today lab draw at [facility].  She verbalized understanding.

## 2019-05-21 ENCOUNTER — PATIENT MESSAGE (OUTPATIENT)
Dept: ENDOSCOPY | Facility: HOSPITAL | Age: 74
End: 2019-05-21

## 2019-05-23 ENCOUNTER — OFFICE VISIT (OUTPATIENT)
Dept: SURGERY | Facility: CLINIC | Age: 74
End: 2019-05-23
Payer: MEDICARE

## 2019-05-23 ENCOUNTER — TELEPHONE (OUTPATIENT)
Dept: ENDOSCOPY | Facility: HOSPITAL | Age: 74
End: 2019-05-23

## 2019-05-23 VITALS — BODY MASS INDEX: 22.53 KG/M2 | HEIGHT: 64 IN | WEIGHT: 132 LBS

## 2019-05-23 DIAGNOSIS — Z90.49 S/P LAPAROSCOPIC CHOLECYSTECTOMY: Primary | ICD-10-CM

## 2019-05-23 PROCEDURE — 99024 POSTOP FOLLOW-UP VISIT: CPT | Mod: S$GLB,,, | Performed by: PHYSICIAN ASSISTANT

## 2019-05-23 PROCEDURE — 99999 PR PBB SHADOW E&M-EST. PATIENT-LVL III: ICD-10-PCS | Mod: PBBFAC,,, | Performed by: PHYSICIAN ASSISTANT

## 2019-05-23 PROCEDURE — 99024 PR POST-OP FOLLOW-UP VISIT: ICD-10-PCS | Mod: S$GLB,,, | Performed by: PHYSICIAN ASSISTANT

## 2019-05-23 PROCEDURE — 99999 PR PBB SHADOW E&M-EST. PATIENT-LVL III: CPT | Mod: PBBFAC,,, | Performed by: PHYSICIAN ASSISTANT

## 2019-05-23 NOTE — TELEPHONE ENCOUNTER
----- Message from Amena Birch sent at 5/23/2019  2:39 PM CDT -----  Contact: Self- [phone]  Devaughn- pt returning call to schedule ERCP- please contact pt at [phone]

## 2019-05-23 NOTE — TELEPHONE ENCOUNTER
----- Message from Amena Birch sent at 5/23/2019  9:52 AM CDT -----  Contact: Self- [phone]  Devaughn- pt returning call to schedule ERCP- please contact pt at [phone]

## 2019-05-23 NOTE — PROGRESS NOTES
SUBJECTIVE:  The patient is a 73 y.o. y/o female 2 weeks s/p laparoscopic cholecystectomy. She denies pain, fevers, chills, nausea, vomiting, diarrhea, or constipation. Eating well with normal appetite and bowel function. Denies redness around or drainage from incisions.    OBJECTIVE:  GEN: female in NAD  ABD: soft, non-tender, non-distended  INCISIONS: clean, dry and intact, healing well without signs of infection or hernia    ASSESSMENT/PLAN:  Doing well 2 weeks s/p laparoscopic cholecystectomy for chronic cholecystitis. Patient is advised to avoid heavy lifting or strenuous activity for another 2-4 weeks. May resume light cardio. Patient may bathe and continue to take a regular diet. Will follow-up with me on an as-needed basis. All questions answered; patient is comfortable with follow-up plan.

## 2019-05-24 ENCOUNTER — OFFICE VISIT (OUTPATIENT)
Dept: INTERNAL MEDICINE | Facility: CLINIC | Age: 74
End: 2019-05-24
Payer: MEDICARE

## 2019-05-24 VITALS
HEART RATE: 91 BPM | HEIGHT: 64 IN | BODY MASS INDEX: 22.62 KG/M2 | DIASTOLIC BLOOD PRESSURE: 80 MMHG | WEIGHT: 132.5 LBS | SYSTOLIC BLOOD PRESSURE: 130 MMHG

## 2019-05-24 DIAGNOSIS — Z90.49 STATUS POST LAPAROSCOPIC CHOLECYSTECTOMY: ICD-10-CM

## 2019-05-24 DIAGNOSIS — I10 ESSENTIAL HYPERTENSION, BENIGN: Primary | ICD-10-CM

## 2019-05-24 PROBLEM — K80.51 CHOLEDOCHOLITHIASIS WITH OBSTRUCTION: Status: RESOLVED | Noted: 2019-05-07 | Resolved: 2019-05-24

## 2019-05-24 PROCEDURE — 99999 PR PBB SHADOW E&M-EST. PATIENT-LVL III: ICD-10-PCS | Mod: PBBFAC,,, | Performed by: INTERNAL MEDICINE

## 2019-05-24 PROCEDURE — 3075F PR MOST RECENT SYSTOLIC BLOOD PRESS GE 130-139MM HG: ICD-10-PCS | Mod: CPTII,S$GLB,, | Performed by: INTERNAL MEDICINE

## 2019-05-24 PROCEDURE — 3079F PR MOST RECENT DIASTOLIC BLOOD PRESSURE 80-89 MM HG: ICD-10-PCS | Mod: CPTII,S$GLB,, | Performed by: INTERNAL MEDICINE

## 2019-05-24 PROCEDURE — 99213 OFFICE O/P EST LOW 20 MIN: CPT | Mod: S$GLB,,, | Performed by: INTERNAL MEDICINE

## 2019-05-24 PROCEDURE — 3075F SYST BP GE 130 - 139MM HG: CPT | Mod: CPTII,S$GLB,, | Performed by: INTERNAL MEDICINE

## 2019-05-24 PROCEDURE — 3079F DIAST BP 80-89 MM HG: CPT | Mod: CPTII,S$GLB,, | Performed by: INTERNAL MEDICINE

## 2019-05-24 PROCEDURE — 1101F PT FALLS ASSESS-DOCD LE1/YR: CPT | Mod: CPTII,S$GLB,, | Performed by: INTERNAL MEDICINE

## 2019-05-24 PROCEDURE — 99499 RISK ADDL DX/OHS AUDIT: ICD-10-PCS | Mod: S$GLB,,, | Performed by: INTERNAL MEDICINE

## 2019-05-24 PROCEDURE — 99499 UNLISTED E&M SERVICE: CPT | Mod: S$GLB,,, | Performed by: INTERNAL MEDICINE

## 2019-05-24 PROCEDURE — 1101F PR PT FALLS ASSESS DOC 0-1 FALLS W/OUT INJ PAST YR: ICD-10-PCS | Mod: CPTII,S$GLB,, | Performed by: INTERNAL MEDICINE

## 2019-05-24 PROCEDURE — 99999 PR PBB SHADOW E&M-EST. PATIENT-LVL III: CPT | Mod: PBBFAC,,, | Performed by: INTERNAL MEDICINE

## 2019-05-24 PROCEDURE — 99213 PR OFFICE/OUTPT VISIT, EST, LEVL III, 20-29 MIN: ICD-10-PCS | Mod: S$GLB,,, | Performed by: INTERNAL MEDICINE

## 2019-05-24 NOTE — PROGRESS NOTES
Subjective:       Patient ID: Torie Richard is a 73 y.o. female.    Chief Complaint: Post-op Evaluation    Pt here for f/u after hospitalization for acute cholecystitis s/p lap bharat. She did have biliary stent placed initially. Record reviewed. Doing well and recovering as expected. Has had f/u with surgery since discharge. She has appt for ERCP to remove biliary stent in 3 weeks.     Pt's BP is well controlled. Tolerating meds well. Pt denies cp/sob/ha/vision or neuro changes. Checking at home and is well controlled.       Review of Systems   Constitutional: Positive for activity change. Negative for unexpected weight change.   HENT: Negative for hearing loss, rhinorrhea and trouble swallowing.    Eyes: Negative for discharge and visual disturbance.   Respiratory: Negative for chest tightness, shortness of breath and wheezing.    Cardiovascular: Negative for chest pain and palpitations.   Gastrointestinal: Negative for blood in stool, constipation, diarrhea and vomiting.   Endocrine: Negative for polydipsia and polyuria.   Genitourinary: Negative for difficulty urinating, dysuria, hematuria and menstrual problem.   Musculoskeletal: Negative for arthralgias, joint swelling and neck pain.   Neurological: Positive for weakness. Negative for headaches.   Psychiatric/Behavioral: Negative for confusion and dysphoric mood.       Objective:      Physical Exam   Constitutional: She is oriented to person, place, and time. She appears well-developed and well-nourished.   Eyes: Pupils are equal, round, and reactive to light. EOM are normal.   Neck: Neck supple. No thyromegaly present.   Cardiovascular: Normal rate, regular rhythm and normal heart sounds.   Pulmonary/Chest: Effort normal and breath sounds normal.   Abdominal: Soft. Bowel sounds are normal. She exhibits no distension. There is no tenderness.   incisions c/d/i and healing well   Musculoskeletal: She exhibits no edema.   Lymphadenopathy:     She has no  cervical adenopathy.   Neurological: She is alert and oriented to person, place, and time. No cranial nerve deficit.   Psychiatric: She has a normal mood and affect. Her behavior is normal.       Assessment:       1. Essential hypertension, benign    2. Status post laparoscopic cholecystectomy        Plan:       1. Continue current meds  2. Keep f/u with surgery

## 2019-05-29 ENCOUNTER — TELEPHONE (OUTPATIENT)
Dept: ENDOSCOPY | Facility: HOSPITAL | Age: 74
End: 2019-05-29

## 2019-05-29 NOTE — TELEPHONE ENCOUNTER
Spoke to pt, she is scheduled for an ERCP on 6/14/19 at 11:00 am. Medical history/medications reviewed and prep instructions sent to pt via [facility].

## 2019-05-31 ENCOUNTER — TELEPHONE (OUTPATIENT)
Dept: ENDOSCOPY | Facility: HOSPITAL | Age: 74
End: 2019-05-31

## 2019-05-31 ENCOUNTER — TELEPHONE (OUTPATIENT)
Dept: GASTROENTEROLOGY | Facility: CLINIC | Age: 74
End: 2019-05-31

## 2019-05-31 NOTE — TELEPHONE ENCOUNTER
----- Message from Patricia Lynch sent at 5/31/2019  3:26 PM CDT -----  Contact: pt#[phone]  Patient Returning Call from [facility]    Who Left Message for Patient:Trinidad  Communication Preference:call  Additional Information:

## 2019-05-31 NOTE — TELEPHONE ENCOUNTER
----- Message from Queta Laws sent at 5/31/2019  9:56 AM CDT -----  Contact: pt: [phone]  Patient Returning Call from [facility]    Who Left Message for Patient: Trinidad     Communication Preference: pt: [phone]

## 2019-06-14 ENCOUNTER — ANESTHESIA EVENT (OUTPATIENT)
Dept: ENDOSCOPY | Facility: HOSPITAL | Age: 74
End: 2019-06-14
Payer: MEDICARE

## 2019-06-14 ENCOUNTER — HOSPITAL ENCOUNTER (OUTPATIENT)
Facility: HOSPITAL | Age: 74
Discharge: HOME | End: 2019-06-14
Attending: INTERNAL MEDICINE | Admitting: INTERNAL MEDICINE
Payer: MEDICARE

## 2019-06-14 ENCOUNTER — ANESTHESIA (OUTPATIENT)
Dept: ENDOSCOPY | Facility: HOSPITAL | Age: 74
End: 2019-06-14
Payer: MEDICARE

## 2019-06-14 VITALS
WEIGHT: 130 LBS | BODY MASS INDEX: 22.2 KG/M2 | OXYGEN SATURATION: 99 % | HEIGHT: 64 IN | TEMPERATURE: 98 F | SYSTOLIC BLOOD PRESSURE: 184 MMHG | RESPIRATION RATE: 20 BRPM | DIASTOLIC BLOOD PRESSURE: 98 MMHG | HEART RATE: 64 BPM

## 2019-06-14 DIAGNOSIS — K80.50 BILE DUCT STONE: ICD-10-CM

## 2019-06-14 PROCEDURE — 43275 ERCP REMOVE FORGN BODY DUCT: CPT | Performed by: INTERNAL MEDICINE

## 2019-06-14 PROCEDURE — 74328 PR  X-RAY FOR BILE DUCT ENDOSCOPY: ICD-10-PCS | Mod: 26,,, | Performed by: INTERNAL MEDICINE

## 2019-06-14 PROCEDURE — 43264 ERCP REMOVE DUCT CALCULI: CPT | Mod: 51,,, | Performed by: INTERNAL MEDICINE

## 2019-06-14 PROCEDURE — 37000009 HC ANESTHESIA EA ADD 15 MINS: Performed by: INTERNAL MEDICINE

## 2019-06-14 PROCEDURE — 27201089 HC SNARE, DISP (ANY): Performed by: INTERNAL MEDICINE

## 2019-06-14 PROCEDURE — 27000221 HC OXYGEN, UP TO 24 HOURS

## 2019-06-14 PROCEDURE — D9220A PRA ANESTHESIA: Mod: CRNA,,, | Performed by: NURSE ANESTHETIST, CERTIFIED REGISTERED

## 2019-06-14 PROCEDURE — C1769 GUIDE WIRE: HCPCS | Performed by: INTERNAL MEDICINE

## 2019-06-14 PROCEDURE — 37000008 HC ANESTHESIA 1ST 15 MINUTES: Performed by: INTERNAL MEDICINE

## 2019-06-14 PROCEDURE — 74328 X-RAY BILE DUCT ENDOSCOPY: CPT | Performed by: INTERNAL MEDICINE

## 2019-06-14 PROCEDURE — 25000003 PHARM REV CODE 250: Performed by: INTERNAL MEDICINE

## 2019-06-14 PROCEDURE — 43264 PR ERCP,W/REMOVAL STONE,BIL/PANCR DUCTS: ICD-10-PCS | Mod: 51,,, | Performed by: INTERNAL MEDICINE

## 2019-06-14 PROCEDURE — D9220A PRA ANESTHESIA: ICD-10-PCS | Mod: CRNA,,, | Performed by: NURSE ANESTHETIST, CERTIFIED REGISTERED

## 2019-06-14 PROCEDURE — 43275 ERCP REMOVE FORGN BODY DUCT: CPT | Mod: ,,, | Performed by: INTERNAL MEDICINE

## 2019-06-14 PROCEDURE — 25500020 PHARM REV CODE 255: Performed by: INTERNAL MEDICINE

## 2019-06-14 PROCEDURE — D9220A PRA ANESTHESIA: ICD-10-PCS | Mod: ANES,,, | Performed by: ANESTHESIOLOGY

## 2019-06-14 PROCEDURE — 43275 PR ERCP W/REMOVAL FOREIGN BODY/STENT FROM BILIARY/PANCREATIC DUCT: ICD-10-PCS | Mod: ,,, | Performed by: INTERNAL MEDICINE

## 2019-06-14 PROCEDURE — 27202125 HC BALLOON, EXTRACTION (ANY): Performed by: INTERNAL MEDICINE

## 2019-06-14 PROCEDURE — 63600175 PHARM REV CODE 636 W HCPCS: Performed by: NURSE ANESTHETIST, CERTIFIED REGISTERED

## 2019-06-14 PROCEDURE — 94761 N-INVAS EAR/PLS OXIMETRY MLT: CPT

## 2019-06-14 PROCEDURE — 74328 X-RAY BILE DUCT ENDOSCOPY: CPT | Mod: 26,,, | Performed by: INTERNAL MEDICINE

## 2019-06-14 PROCEDURE — D9220A PRA ANESTHESIA: Mod: ANES,,, | Performed by: ANESTHESIOLOGY

## 2019-06-14 PROCEDURE — 25000003 PHARM REV CODE 250: Performed by: NURSE ANESTHETIST, CERTIFIED REGISTERED

## 2019-06-14 PROCEDURE — 43264 ERCP REMOVE DUCT CALCULI: CPT | Performed by: INTERNAL MEDICINE

## 2019-06-14 RX ORDER — MIDAZOLAM HYDROCHLORIDE 1 MG/ML
INJECTION, SOLUTION INTRAMUSCULAR; INTRAVENOUS
Status: DISCONTINUED | OUTPATIENT
Start: 2019-06-14 | End: 2019-06-14

## 2019-06-14 RX ORDER — PROPOFOL 10 MG/ML
VIAL (ML) INTRAVENOUS
Status: DISCONTINUED | OUTPATIENT
Start: 2019-06-14 | End: 2019-06-14

## 2019-06-14 RX ORDER — PROPOFOL 10 MG/ML
VIAL (ML) INTRAVENOUS CONTINUOUS PRN
Status: DISCONTINUED | OUTPATIENT
Start: 2019-06-14 | End: 2019-06-14

## 2019-06-14 RX ORDER — SODIUM CHLORIDE 0.9 % (FLUSH) 0.9 %
10 SYRINGE (ML) INJECTION
Status: DISCONTINUED | OUTPATIENT
Start: 2019-06-14 | End: 2019-06-14 | Stop reason: HOSPADM

## 2019-06-14 RX ORDER — SODIUM CHLORIDE 0.9 % (FLUSH) 0.9 %
10 SYRINGE (ML) INJECTION
Status: ACTIVE | OUTPATIENT
Start: 2019-06-14

## 2019-06-14 RX ORDER — LIDOCAINE HCL/PF 100 MG/5ML
SYRINGE (ML) INTRAVENOUS
Status: DISCONTINUED | OUTPATIENT
Start: 2019-06-14 | End: 2019-06-14

## 2019-06-14 RX ORDER — KETAMINE HYDROCHLORIDE 10 MG/ML
INJECTION, SOLUTION INTRAMUSCULAR; INTRAVENOUS
Status: DISCONTINUED | OUTPATIENT
Start: 2019-06-14 | End: 2019-06-14

## 2019-06-14 RX ORDER — SODIUM CHLORIDE 9 MG/ML
INJECTION, SOLUTION INTRAVENOUS CONTINUOUS
Status: ACTIVE | OUTPATIENT
Start: 2019-06-14

## 2019-06-14 RX ADMIN — KETAMINE HYDROCHLORIDE 10 MG: 10 INJECTION, SOLUTION INTRAMUSCULAR; INTRAVENOUS at 10:06

## 2019-06-14 RX ADMIN — PROPOFOL 50 MG: 10 INJECTION, EMULSION INTRAVENOUS at 10:06

## 2019-06-14 RX ADMIN — IOHEXOL 9 ML: 300 INJECTION, SOLUTION INTRAVENOUS at 10:06

## 2019-06-14 RX ADMIN — PROPOFOL 125 MCG/KG/MIN: 10 INJECTION, EMULSION INTRAVENOUS at 10:06

## 2019-06-14 RX ADMIN — MIDAZOLAM HYDROCHLORIDE 2 MG: 1 INJECTION, SOLUTION INTRAMUSCULAR; INTRAVENOUS at 10:06

## 2019-06-14 RX ADMIN — SODIUM CHLORIDE: 0.9 INJECTION, SOLUTION INTRAVENOUS at 10:06

## 2019-06-14 RX ADMIN — LIDOCAINE HYDROCHLORIDE 50 MG: 20 INJECTION, SOLUTION INTRAVENOUS at 10:06

## 2019-06-14 NOTE — ANESTHESIA POSTPROCEDURE EVALUATION
Anesthesia Post Evaluation    Patient: Torie Richard    Procedure(s) Performed: Procedure(s) (LRB):  ERCP (ENDOSCOPIC RETROGRADE CHOLANGIOPANCREATOGRAPHY) (N/A)    Final Anesthesia Type: general  Patient location during evaluation: PACU  Patient participation: Yes- Able to Participate  Level of consciousness: awake and alert and oriented  Post-procedure vital signs: reviewed and stable  Pain management: adequate  Airway patency: patent  PONV status at discharge: No PONV  Anesthetic complications: no      Cardiovascular status: blood pressure returned to baseline, hemodynamically stable and stable  Respiratory status: unassisted, room air and spontaneous ventilation  Hydration status: euvolemic  Follow-up not needed.          Vitals Value Taken Time   /88 6/14/2019 11:47 AM   Temp 36.4 °C (97.6 °F) 6/14/2019 11:39 AM   Pulse 63 6/14/2019 11:51 AM   Resp 16 6/14/2019 11:45 AM   SpO2 99 % 6/14/2019 11:51 AM   Vitals shown include unvalidated device data.      Event Time     Out of Recovery 11:40:30          Pain/Rody Score: Rody Score: 10 (6/14/2019 11:15 AM)

## 2019-06-14 NOTE — PROGRESS NOTES
Patient and brother state they are ready to be discharged. Instructions and report given to patient and family. Both verbalize understanding. Patient tolerating po liquids with no difficulty. Patient states pain is at a tolerable level for them. Anesthesia consent and surgical consent in chart upon patient's discharge from [facility].

## 2019-06-14 NOTE — DISCHARGE INSTRUCTIONS
ERCP (Endoscopic Retrograde Cholangiopancreatography)     A balloon at the tip of a catheter opens above the stone. The stone is pulled out of the duct and leaves your body through stool.     ERCP stands for endoscopic retrograde cholangiopancreatography. This procedure is used to view the biliary and pancreatic ducts.  It is used to evaluate diseases that affect the ducts and to help locate and treat blockages that may be present.  How do I get ready for ERCP?  · Talk to your doctor about any health problems or allergies you have.  · Ask your doctor about the risks of ERCP. These include pancreatitis, infection, bleeding, and tearing the bowel.  · Be sure your doctor knows about all medicines you take. You may be told to stop taking some or all of them before the test. This includes:  · All prescription medicines  · Over-the-counter medicines that don't need a prescription  ·  Any street drugs you may use   · Herbs, vitamins, kelp, seaweed, cough syrups, and other supplements  · You may be asked to take antibiotics ahead of time.  · Avoid blood-thinning medicines for 1 week before ERCP.  · Do not eat or drink for 8 to 12 hours before ERCP.  · Have someone ready to take you home.  What happens during the procedure?  · You may be given medicine through an IV to help you relax.  · Your throat is numbed.  · A thin tube (endoscope) is placed into your throat. It is advanced from the throat through the upper digestive tract, to the common bile duct opening. The endoscope lets the doctor see the common bile and pancreatic ducts on a video screen.  · A cut may be made where the common bile duct opens to the duodenum to make it easier to remove stones.  · As blockages are located and removed, X-rays are taken.  · Contrast dye is injected through a catheter to make the duct show up better on the X-rays.  · An imaging technique that uses X-rays to obtain real-time moving images of internal organs is called fluoroscopy.  Fluoroscopy is used to watch and guide progress of the procedure.   · In some cases, a plastic tube (stent) is placed to hold the ducts open. This stent may be replaced or removed in 6 to 8 weeks. Or it may be left to fall out on its own and be passed in the stool.  What happens after ERCP?  Your doctor may discuss the test results right away or a return visit may be scheduled. You may go home the same day or spend the night in the hospital. Follow these tips:  · You can return to a normal routine the day after the ERCP.  · If a cut was made in the duct, avoid blood-thinning medicines such as aspirin for 5 to 7 days.  · Call your doctor right away if you have a fever or abdominal pain. These may be signs of an infection or torn bowel.   Date Last Reviewed: 6/19/2015 © 2000-2017 [employer] [address] [place] [address]. All rights reserved. This information is not intended as a substitute for professional medical care. Always follow your healthcare professional's instructions.        Recovery After Procedural Sedation (Adult)  You have been given medicine by vein to make you sleep during your surgery. This may have included both a pain medicine and sleeping medicine. Most of the effects have worn off. But you may still have some drowsiness for the next 6 to 8 hours.  Home care  Follow these guidelines when you get home:  · For the next 8 hours, you should be watched by a responsible adult. This person should make sure your condition is not getting worse.  · Don't drink any alcohol for the next 24 hours.  · Don't drive, operate dangerous machinery, or make important business or personal decisions during the next 24 hours.  Note: Your healthcare provider may tell you not to take any medicine by mouth for pain or sleep in the next 4 hours. These medicines may react with the medicines you were given in the hospital. This could cause a much stronger response than usual.  Follow-up care  Follow  up with your healthcare provider if you are not alert and back to your usual level of activity within 12 hours.  When to seek medical advice  Call your healthcare provider right away if any of these occur:  · Drowsiness gets worse  · Weakness or dizziness gets worse  · Repeated vomiting  · You can't be awakened   Date Last Reviewed: 10/18/2016  © 9087-8578 [employer]. [address] [place] [address]. All rights reserved. This information is not intended as a substitute for professional medical care. Always follow your healthcare professional's instructions.  PATIENT INSTRUCTIONS  POST-ANESTHESIA    IMMEDIATELY FOLLOWING SURGERY:  Do not drive or operate machinery for the first twenty four hours after surgery.  Do not make any important decisions for twenty four hours after surgery or while taking narcotic pain medications or sedatives.  If you develop intractable nausea and vomiting or a severe headache please notify your doctor immediately.    FOLLOW-UP:  Please make an appointment with your surgeon as instructed. You do not need to follow up with anesthesia unless specifically instructed to do so.    WOUND CARE INSTRUCTIONS (if applicable):  Keep a dry clean dressing on the anesthesia/puncture wound site if there is drainage.  Once the wound has quit draining you may leave it open to air.  Generally you should leave the bandage intact for twenty four hours unless there is drainage.  If the epidural site drains for more than 36-48 hours please call the anesthesia department.    QUESTIONS?:  Please feel free to call your physician or the hospital  if you have any questions, and they will be happy to assist you.       [facility] Anesthesia Department  [address] [facility]  [phone]

## 2019-06-14 NOTE — ANESTHESIA PREPROCEDURE EVALUATION
2019  Pre-operative evaluation for Procedure(s) (LRB):  ERCP (ENDOSCOPIC RETROGRADE CHOLANGIOPANCREATOGRAPHY) (N/A)    Torie Richard is a 73 y.o. female     Patient Active Problem List   Diagnosis    Mixed hyperlipidemia    Insomnia    Rosacea    Chronic low back pain    Essential hypertension, benign       Review of patient's allergies indicates:   Allergen Reactions    Meloxicam Rash     Flushing         No current facility-administered medications on file prior to encounter.      Current Outpatient Medications on File Prior to Encounter   Medication Sig Dispense Refill    acetaminophen (TYLENOL) 500 MG tablet Take 500 mg by mouth every 6 (six) hours as needed for Pain.      doxylamine succinate (SLEEP AID, DOXYLAMINE,) 25 mg tablet Take 25 mg by mouth nightly as needed.        ibuprofen (ADVIL,MOTRIN) 200 MG tablet Take 200 mg by mouth every 6 (six) hours as needed.      losartan (COZAAR) 25 MG tablet Take 1 tablet (25 mg total) by mouth once daily. 90 tablet 3    melatonin 5 mg Tab Take 5 mg by mouth.       naproxen sodium (ANAPROX) 220 MG tablet       simvastatin (ZOCOR) 20 MG tablet TAKE 1 TABLET (20 MG TOTAL) BY MOUTH EVERY EVENING. 90 tablet 3       Past Surgical History:   Procedure Laterality Date     SECTION      CHOLECYSTECTOMY, LAPAROSCOPIC N/A 2019    Performed by Tayo Guadarrama [facility] at [facility] 2ND FLR    ERCP (ENDOSCOPIC RETROGRADE CHOLANGIOPANCREATOGRAPHY) N/A 2019    Performed by Jr Cantor MD at [facility] ENDO (2ND FLR)    EXCISIONAL HEMORRHOIDECTOMY      TONSILLECTOMY         Social History     Socioeconomic History    Marital status:      Spouse name: Not on file    Number of children: Not on file    Years of education: Not on file    Highest education level: Not on file   Occupational History     Employer: [employer]  Needs    Financial resource strain: Not very hard    Food insecurity:     Worry: Never true     Inability: Never true    Transportation needs:     Medical: No     Non-medical: No   Tobacco Use    Smoking status: Former Smoker     Types: Cigarettes     Last attempt to quit: 10/18/1981     Years since quittin.6    Smokeless tobacco: Never Used   Substance and Sexual Activity    Alcohol use: Yes     Alcohol/week: 1.0 oz     Types: 2 Standard drinks or equivalent per week     Frequency: Never     Drinks per session: Patient refused     Binge frequency: Never     Comment: social     Drug use: No    Sexual activity: Yes     Partners: Male   Lifestyle    Physical activity:     Days per week: 2 days     Minutes per session: 90 min    Stress: Not at all   Relationships    Social connections:     Talks on phone: More than three times a week     Gets together: Twice a week     Attends Mu-ism service: Not on file     Active member of club or organization: Yes     Attends meetings of clubs or organizations: More than 4 times per year     Relationship status:    Other Topics Concern    Are you pregnant or think you may be? Not Asked    Breast-feeding Not Asked   Social History Narrative    Not on file         CBC: No results for input(s): WBC, RBC, HGB, HCT, PLT, MCV, MCH, MCHC in the last 72 hours.    CMP: No results for input(s): NA, K, CL, CO2, BUN, CREATININE, GLU, MG, PHOS, CALCIUM, ALBUMIN, PROT, ALKPHOS, ALT, AST, BILITOT in the last 72 hours.    INR  No results for input(s): PT, INR, PROTIME, APTT in the last 72 hours.        Diagnostic Studies:      EKG:  Sinus rhythm with marked sinus arrythmia  Otherwise normal ECG  No previous ECGs available  Confirmed by Macario aHas MD ([id number]) on 2019 12:53:42 AM  2D Echo:  No results found for this or any previous visit.      Anesthesia Evaluation    I have reviewed the Patient Summary Reports.    I have reviewed the Nursing Notes.   I have reviewed  the Medications.     Review of Systems  Anesthesia Hx:  No problems with previous Anesthesia  History of prior surgery of interest to airway management or planning: Denies Family Hx of Anesthesia complications.   Denies Personal Hx of Anesthesia complications.   Hematology/Oncology:         -- Denies Anemia:   Cardiovascular:   Exercise tolerance: good Hypertension Denies CAD.    Denies CABG/stent.     Pulmonary:   Denies COPD.  Denies Asthma.  Denies Sleep Apnea.    Renal/:   Denies Chronic Renal Disease.     Hepatic/GI:   Denies GERD. Denies Liver Disease.    Neurological:   Denies CVA. Denies Seizures.    Endocrine:   Denies Diabetes.        Physical Exam  General:  Well nourished    Airway/Jaw/Neck:  Airway Findings: Mouth Opening: Normal Tongue: Normal  General Airway Assessment: Adult  Mallampati: II  Improves to I with phonation.  TM Distance: Normal, at least 6 cm  Jaw/Neck Findings:  Neck ROM: Normal ROM      Dental:  Dental Findings: In tact   Chest/Lungs:  Chest/Lungs Findings: Normal Respiratory Rate, Clear to auscultation     Heart/Vascular:  Heart Findings: Rate: Normal  Rhythm: Regular Rhythm  Sounds: Normal        Mental Status:  Mental Status Findings:  Cooperative, Alert and Oriented         Anesthesia Plan  Type of Anesthesia, risks & benefits discussed:  Anesthesia Type:  general  Patient's Preference:   Intra-op Monitoring Plan: standard ASA monitors  Intra-op Monitoring Plan Comments:   Post Op Pain Control Plan: per primary service following discharge from PACU  Post Op Pain Control Plan Comments:   Induction:   IV  Beta Blocker:  Patient is not currently on a Beta-Blocker (No further documentation required).       Informed Consent: Patient understands risks and agrees with Anesthesia plan.  Questions answered. Anesthesia consent signed with patient.  ASA Score: 2     Day of Surgery Review of History & Physical:    H&P update referred to the provider.         Ready For Surgery From  Anesthesia Perspective.

## 2019-06-14 NOTE — H&P
Short Stay Endoscopy History and Physical    PCP - Bartolo Burciaga MD  Referring Physician - Tayo Guadarrama MD  [address] [place] [address]    Procedure - ercp  ASA - per anesthesia  Mallampati - per anesthesia  History of Anesthesia problems - no  Family history Anesthesia problems -  no   Plan of anesthesia - General    HPI:  This is a 73 y.o. female here for evaluation of: stent removal    Reflux - no  Dysphagia - no  Abdominal pain - no  Diarrhea - no    ROS:  Constitutional: No fevers, chills, No weight loss  CV: No chest pain  Pulm: No cough, No shortness of breath  Ophtho: No vision changes  GI: see HPI  Derm: No rash    Medical History:  has a past medical history of Hyperlipidemia and Hypertension.    Surgical History:  has a past surgical history that includes Tonsillectomy;  section; Excisional hemorrhoidectomy; ERCP (N/A, 2019); and Laparoscopic cholecystectomy (N/A, 2019).    Family History: family history includes Breast cancer in her maternal aunt and paternal aunt; COPD in her brother; Depression in her sister; Heart disease in her father; Hyperlipidemia in her father, mother, and sister; Hypertension in her father, mother, and sister; Sudden death in her mother..    Social History:  reports that she quit smoking about 37 years ago. Her smoking use included cigarettes. She has never used smokeless tobacco. She reports that she drinks about 1.0 oz of alcohol per week. She reports that she does not use drugs.    Review of patient's allergies indicates:   Allergen Reactions    Meloxicam Rash     Flushing         Medications:   Medications Prior to Admission   Medication Sig Dispense Refill Last Dose    acetaminophen (TYLENOL) 500 MG tablet Take 500 mg by mouth every 6 (six) hours as needed for Pain.   2019 at Unknown time    doxylamine succinate (SLEEP AID, DOXYLAMINE,) 25 mg tablet Take 25 mg by mouth nightly as needed.     2019 at Unknown time     ibuprofen (ADVIL,MOTRIN) 200 MG tablet Take 200 mg by mouth every 6 (six) hours as needed.   6/13/2019 at Unknown time    losartan (COZAAR) 25 MG tablet Take 1 tablet (25 mg total) by mouth once daily. 90 tablet 3 6/14/2019 at Unknown time    melatonin 5 mg Tab Take 5 mg by mouth.    Past Week at Unknown time    naproxen sodium (ANAPROX) 220 MG tablet    Past Month at Unknown time    simvastatin (ZOCOR) 20 MG tablet TAKE 1 TABLET (20 MG TOTAL) BY MOUTH EVERY EVENING. 90 tablet 3 6/13/2019 at Unknown time       Physical Exam:    Vital Signs:   Vitals:    06/14/19 0959   BP: (!) 187/112   Pulse: 67   Resp: 16   Temp: 97.5 °F (36.4 °C)       General Appearance: Well appearing in no acute distress    Labs:  Lab Results   Component Value Date    WBC 8.81 05/17/2019    HGB 11.9 (L) 05/17/2019    HCT 37.6 05/17/2019     (H) 05/17/2019    CHOL 156 03/14/2019    TRIG 90 03/14/2019    HDL 60 03/14/2019     (H) 05/11/2019    AST 55 (H) 05/11/2019     05/11/2019    K 3.4 (L) 05/11/2019     05/11/2019    CREATININE 0.6 05/11/2019    BUN 11 05/11/2019    CO2 27 05/11/2019    TSH 0.690 05/02/2016    INR 0.9 05/11/2019       I have explained the risks and benefits of this endoscopic procedure to the patient including but not limited to bleeding, inflammation, infection, perforation, and death.      Jr Cantor MD

## 2019-06-14 NOTE — PROGRESS NOTES
Dr. Multani aware of current /82 and her BP on arrival today was 187/112. OK for patient to go to [facility] per Dr. Multani.

## 2019-06-14 NOTE — TRANSFER OF CARE
"Anesthesia Transfer of Care Note    Patient: Torie Richard    Procedure(s) Performed: Procedure(s) (LRB):  ERCP (ENDOSCOPIC RETROGRADE CHOLANGIOPANCREATOGRAPHY) (N/A)    Patient location: PACU    Anesthesia Type: general    Transport from OR: Transported from OR on 2-3 L/min O2 by NC with adequate spontaneous ventilation    Post pain: adequate analgesia    Post assessment: no apparent anesthetic complications    Post vital signs: stable    Level of consciousness: sedated    Nausea/Vomiting: no nausea/vomiting    Complications: none    Transfer of care protocol was followed      Last vitals:   Visit Vitals  BP (!) 187/112 (BP Location: Left arm, Patient Position: Lying)   Pulse (P) 64   Temp (P) 36.3 °C (97.3 °F) (Temporal)   Resp (P) 16   Ht 5' 4" (1.626 m)   Wt 59 kg (130 lb)   SpO2 (P) 100%   Breastfeeding? No   BMI 22.31 kg/m²     "

## 2019-06-14 NOTE — [employer] PATIENT INSTRUCTIONS
Discharge Summary/Instructions after an Endoscopic Procedure  Patient Name: Torie Richard  Patient MRN: [id number]  Patient YOB: 1945 Friday, June 14, 2019  Jr Cantor MD  RESTRICTIONS:  During your procedure today, you received medications for sedation.  These   medications may affect your judgment, balance and coordination.  Therefore,   for 24 hours, you have the following restrictions:   - DO NOT drive a car, operate machinery, make legal/financial decisions,   sign important papers or drink alcohol.    ACTIVITY:  Today: no heavy lifting, straining or running due to procedural   sedation/anesthesia.  The following day: return to full activity including work.  DIET:  Eat and drink normally unless instructed otherwise.     TREATMENT FOR COMMON SIDE EFFECTS:  - Mild abdominal pain, nausea, belching, bloating or excessive gas:  rest,   eat lightly and use a heating pad.  - Sore Throat: treat with throat lozenges and/or gargle with warm salt   water.  - Because air was used during the procedure, expelling large amounts of air   from your rectum or belching is normal.  - If a bowel prep was taken, you may not have a bowel movement for 1-3 days.    This is normal.  SYMPTOMS TO WATCH FOR AND REPORT TO YOUR PHYSICIAN:  1. Abdominal pain or bloating, other than gas cramps.  2. Chest pain.  3. Back pain.  4. Signs of infection such as: chills or fever occurring within 24 hours   after the procedure.  5. Rectal bleeding, which would show as bright red, maroon, or black stools.   (A tablespoon of blood from the rectum is not serious, especially if   hemorrhoids are present.)  6. Vomiting.  7. Weakness or dizziness.  GO DIRECTLY TO THE NEAREST EMERGENCY ROOM IF YOU HAVE ANY OF THE FOLLOWING:      Difficulty breathing              Chills and/or fever over 101 F   Persistent vomiting and/or vomiting blood   Severe abdominal pain   Severe chest pain   Black, tarry stools   Bleeding- more than one  tablespoon   Any other symptom or condition that you feel may need urgent attention  Your doctor recommends these additional instructions:  If any biopsies were taken, your doctors clinic will contact you in 1 to 2   weeks with any results.  - Discharge patient to home.   - Resume previous diet.   - Continue present medications.   - Return to primary care physician at appointment to be scheduled.  For questions, problems or results please call your physician - Jr Cantor MD at Work:  [phone].  [facility], EMERGENCY ROOM PHONE NUMBER: [phone]  IF A COMPLICATION OR EMERGENCY SITUATION ARISES AND YOU ARE UNABLE TO REACH   YOUR PHYSICIAN - GO DIRECTLY TO THE EMERGENCY ROOM.  Jr Cantor MD  6/14/2019 11:06:07 AM  This report has been verified and signed electronically.  [employer]

## 2019-07-18 ENCOUNTER — PATIENT MESSAGE (OUTPATIENT)
Dept: ADMINISTRATIVE | Facility: HOSPITAL | Age: 74
End: 2019-07-18

## 2019-07-18 ENCOUNTER — PATIENT MESSAGE (OUTPATIENT)
Dept: DERMATOLOGY | Facility: CLINIC | Age: 74
End: 2019-07-18

## 2019-07-18 ENCOUNTER — PATIENT MESSAGE (OUTPATIENT)
Dept: INTERNAL MEDICINE | Facility: CLINIC | Age: 74
End: 2019-07-18

## 2019-07-18 DIAGNOSIS — Z12.39 SCREENING FOR BREAST CANCER: Primary | ICD-10-CM

## 2019-07-26 ENCOUNTER — HOSPITAL ENCOUNTER (OUTPATIENT)
Dept: RADIOLOGY | Facility: OTHER | Age: 74
Discharge: HOME | End: 2019-07-26
Attending: INTERNAL MEDICINE
Payer: MEDICARE

## 2019-07-26 DIAGNOSIS — Z12.39 SCREENING FOR BREAST CANCER: ICD-10-CM

## 2019-07-26 PROCEDURE — 77067 SCR MAMMO BI INCL CAD: CPT | Mod: TC

## 2019-07-26 PROCEDURE — 77067 SCR MAMMO BI INCL CAD: CPT | Mod: 26,,, | Performed by: INTERNAL MEDICINE

## 2019-07-26 PROCEDURE — 77063 MAMMO DIGITAL SCREENING BILAT WITH TOMOSYNTHESIS_CAD: ICD-10-PCS | Mod: 26,,, | Performed by: INTERNAL MEDICINE

## 2019-07-26 PROCEDURE — 77067 MAMMO DIGITAL SCREENING BILAT WITH TOMOSYNTHESIS_CAD: ICD-10-PCS | Mod: 26,,, | Performed by: INTERNAL MEDICINE

## 2019-07-26 PROCEDURE — 77063 BREAST TOMOSYNTHESIS BI: CPT | Mod: 26,,, | Performed by: INTERNAL MEDICINE

## 2019-07-29 ENCOUNTER — OFFICE VISIT (OUTPATIENT)
Dept: DERMATOLOGY | Facility: CLINIC | Age: 74
End: 2019-07-29
Payer: MEDICARE

## 2019-07-29 DIAGNOSIS — L81.4 LENTIGO: ICD-10-CM

## 2019-07-29 DIAGNOSIS — Z12.83 SCREENING EXAM FOR SKIN CANCER: ICD-10-CM

## 2019-07-29 DIAGNOSIS — L82.1 SK (SEBORRHEIC KERATOSIS): Primary | ICD-10-CM

## 2019-07-29 DIAGNOSIS — D22.9 MULTIPLE BENIGN NEVI: ICD-10-CM

## 2019-07-29 PROCEDURE — 99213 OFFICE O/P EST LOW 20 MIN: CPT | Mod: S$GLB,,, | Performed by: DERMATOLOGY

## 2019-07-29 PROCEDURE — 99999 PR PBB SHADOW E&M-EST. PATIENT-LVL II: ICD-10-PCS | Mod: PBBFAC,,, | Performed by: DERMATOLOGY

## 2019-07-29 PROCEDURE — 99999 PR PBB SHADOW E&M-EST. PATIENT-LVL II: CPT | Mod: PBBFAC,,, | Performed by: DERMATOLOGY

## 2019-07-29 PROCEDURE — 1101F PR PT FALLS ASSESS DOC 0-1 FALLS W/OUT INJ PAST YR: ICD-10-PCS | Mod: CPTII,S$GLB,, | Performed by: DERMATOLOGY

## 2019-07-29 PROCEDURE — 99213 PR OFFICE/OUTPT VISIT, EST, LEVL III, 20-29 MIN: ICD-10-PCS | Mod: S$GLB,,, | Performed by: DERMATOLOGY

## 2019-07-29 PROCEDURE — 1101F PT FALLS ASSESS-DOCD LE1/YR: CPT | Mod: CPTII,S$GLB,, | Performed by: DERMATOLOGY

## 2019-07-29 NOTE — PROGRESS NOTES
Subjective:       Patient ID:  Torie Richard is a 74 y.o. female who presents for   Chief Complaint   Patient presents with    Skin Check     ubse    Spot     Nose, x 6 weeks, otc antibiotic cream, itching      Pt here for UBSE. Last seen 2/19 for ICD lips - resolved with aclovate ointment    No h/o nmsc    Patient complains of lesion(s)  Location: nose  Duration: 6 weeks  Symptoms: itches  Relieving factors/Previous treatments: otc abx cream        Review of Systems   Skin: Positive for activity-related sunscreen use. Negative for itching, rash, daily sunscreen use and recent sunburn.   Hematologic/Lymphatic: Does not bruise/bleed easily.        Objective:    Physical Exam   Constitutional: She appears well-developed and well-nourished. No distress.   Neurological: She is alert and oriented to person, place, and time. She is not disoriented.   Psychiatric: She has a normal mood and affect.   Skin:   Areas Examined (abnormalities noted in diagram):   Head / Face Inspection Performed  Neck Inspection Performed  Chest / Axilla Inspection Performed  Back Inspection Performed  RUE Inspected  LUE Inspection Performed                   Diagram Legend     Erythematous scaling macule/papule c/w actinic keratosis       Vascular papule c/w angioma      Pigmented verrucoid papule/plaque c/w seborrheic keratosis      Yellow umbilicated papule c/w sebaceous hyperplasia      Irregularly shaped tan macule c/w lentigo     1-2 mm smooth white papules consistent with Milia      Movable subcutaneous cyst with punctum c/w epidermal inclusion cyst      Subcutaneous movable cyst c/w pilar cyst      Firm pink to brown papule c/w dermatofibroma      Pedunculated fleshy papule(s) c/w skin tag(s)      Evenly pigmented macule c/w junctional nevus     Mildly variegated pigmented, slightly irregular-bordered macule c/w mildly atypical nevus      Flesh colored to evenly pigmented papule c/w intradermal nevus       Pink pearly  papule/plaque c/w basal cell carcinoma      Erythematous hyperkeratotic cursted plaque c/w SCC      Surgical scar with no sign of skin cancer recurrence      Open and closed comedones      Inflammatory papules and pustules      Verrucoid papule consistent consistent with wart     Erythematous eczematous patches and plaques     Dystrophic onycholytic nail with subungual debris c/w onychomycosis     Umbilicated papule    Erythematous-base heme-crusted tan verrucoid plaque consistent with inflamed seborrheic keratosis     Erythematous Silvery Scaling Plaque c/w Psoriasis     See annotation      Assessment / Plan:        SK (seborrheic keratosis)    These are benign inherited growths without a malignant potential. Reassurance given to patient. No treatment is necessary.       Lentigo  This is a benign hyperpigmented sun induced lesion. Daily sun protection will reduce the number of new lesions. Treatment of these benign lesions are considered cosmetic.      Multiple benign nevi  upper body skin examination performed today including at least 6 points as noted in physical examination. No lesions suspicious for malignancy noted.  Reassurance provided.  Instructed patient to observe lesion(s) for changes and follow up in clinic if changes are noted. Discussed ABCDE's of moles and brochure provided.      Screening exam for skin cancer  Upper body skin examination performed today including at least 6 points as noted in physical examination. No lesions suspicious for malignancy noted.    Lesion right medial upper canthus appears resolving inflammatory as opposed to neoplastic. Discussed option of bx with pt who wishes to defer. Will notify me if changed noted               Follow up if symptoms worsen or fail to improve.

## 2020-03-14 ENCOUNTER — PATIENT MESSAGE (OUTPATIENT)
Dept: INTERNAL MEDICINE | Facility: CLINIC | Age: 75
End: 2020-03-14

## 2020-03-16 ENCOUNTER — LAB VISIT (OUTPATIENT)
Dept: LAB | Facility: OTHER | Age: 75
End: 2020-03-16
Attending: INTERNAL MEDICINE
Payer: MEDICARE

## 2020-03-16 DIAGNOSIS — E78.2 MIXED HYPERLIPIDEMIA: Chronic | ICD-10-CM

## 2020-03-16 DIAGNOSIS — I10 ESSENTIAL HYPERTENSION, BENIGN: ICD-10-CM

## 2020-03-16 LAB
ALBUMIN SERPL BCP-MCNC: 4.1 G/DL (ref 3.5–5.2)
ALP SERPL-CCNC: 61 U/L (ref 55–135)
ALT SERPL W/O P-5'-P-CCNC: 14 U/L (ref 10–44)
ANION GAP SERPL CALC-SCNC: 6 MMOL/L (ref 8–16)
AST SERPL-CCNC: 16 U/L (ref 10–40)
BASOPHILS # BLD AUTO: 0.05 K/UL (ref 0–0.2)
BASOPHILS NFR BLD: 0.8 % (ref 0–1.9)
BILIRUB SERPL-MCNC: 0.3 MG/DL (ref 0.1–1)
BUN SERPL-MCNC: 12 MG/DL (ref 8–23)
CALCIUM SERPL-MCNC: 9.3 MG/DL (ref 8.7–10.5)
CHLORIDE SERPL-SCNC: 105 MMOL/L (ref 95–110)
CHOLEST SERPL-MCNC: 162 MG/DL (ref 120–199)
CHOLEST/HDLC SERPL: 3 {RATIO} (ref 2–5)
CO2 SERPL-SCNC: 27 MMOL/L (ref 23–29)
CREAT SERPL-MCNC: 0.8 MG/DL (ref 0.5–1.4)
DIFFERENTIAL METHOD: ABNORMAL
EOSINOPHIL # BLD AUTO: 0.3 K/UL (ref 0–0.5)
EOSINOPHIL NFR BLD: 4.5 % (ref 0–8)
ERYTHROCYTE [DISTWIDTH] IN BLOOD BY AUTOMATED COUNT: 12.5 % (ref 11.5–14.5)
EST. GFR  (AFRICAN AMERICAN): >60 ML/MIN/1.73 M^2
EST. GFR  (NON AFRICAN AMERICAN): >60 ML/MIN/1.73 M^2
GLUCOSE SERPL-MCNC: 89 MG/DL (ref 70–110)
HCT VFR BLD AUTO: 40.8 % (ref 37–48.5)
HDLC SERPL-MCNC: 54 MG/DL (ref 40–75)
HDLC SERPL: 33.3 % (ref 20–50)
HGB BLD-MCNC: 12.9 G/DL (ref 12–16)
IMM GRANULOCYTES # BLD AUTO: 0.01 K/UL (ref 0–0.04)
IMM GRANULOCYTES NFR BLD AUTO: 0.2 % (ref 0–0.5)
LDLC SERPL CALC-MCNC: 89.8 MG/DL (ref 63–159)
LYMPHOCYTES # BLD AUTO: 2.2 K/UL (ref 1–4.8)
LYMPHOCYTES NFR BLD: 34.4 % (ref 18–48)
MCH RBC QN AUTO: 30.6 PG (ref 27–31)
MCHC RBC AUTO-ENTMCNC: 31.6 G/DL (ref 32–36)
MCV RBC AUTO: 97 FL (ref 82–98)
MONOCYTES # BLD AUTO: 0.6 K/UL (ref 0.3–1)
MONOCYTES NFR BLD: 8.8 % (ref 4–15)
NEUTROPHILS # BLD AUTO: 3.2 K/UL (ref 1.8–7.7)
NEUTROPHILS NFR BLD: 51.3 % (ref 38–73)
NONHDLC SERPL-MCNC: 108 MG/DL
NRBC BLD-RTO: 0 /100 WBC
PLATELET # BLD AUTO: 173 K/UL (ref 150–350)
PMV BLD AUTO: 12.1 FL (ref 9.2–12.9)
POTASSIUM SERPL-SCNC: 4.6 MMOL/L (ref 3.5–5.1)
PROT SERPL-MCNC: 6.7 G/DL (ref 6–8.4)
RBC # BLD AUTO: 4.21 M/UL (ref 4–5.4)
SODIUM SERPL-SCNC: 138 MMOL/L (ref 136–145)
TRIGL SERPL-MCNC: 91 MG/DL (ref 30–150)
WBC # BLD AUTO: 6.25 K/UL (ref 3.9–12.7)

## 2020-03-16 PROCEDURE — 80061 LIPID PANEL: CPT

## 2020-03-16 PROCEDURE — 80053 COMPREHEN METABOLIC PANEL: CPT

## 2020-03-16 PROCEDURE — 36415 COLL VENOUS BLD VENIPUNCTURE: CPT

## 2020-03-16 PROCEDURE — 85025 COMPLETE CBC W/AUTO DIFF WBC: CPT

## 2020-03-17 ENCOUNTER — OFFICE VISIT (OUTPATIENT)
Dept: INTERNAL MEDICINE | Facility: CLINIC | Age: 75
End: 2020-03-17
Payer: MEDICARE

## 2020-03-17 VITALS
HEART RATE: 83 BPM | WEIGHT: 138 LBS | HEIGHT: 64 IN | DIASTOLIC BLOOD PRESSURE: 78 MMHG | BODY MASS INDEX: 23.56 KG/M2 | OXYGEN SATURATION: 98 % | SYSTOLIC BLOOD PRESSURE: 146 MMHG

## 2020-03-17 DIAGNOSIS — Z12.11 COLON CANCER SCREENING: ICD-10-CM

## 2020-03-17 DIAGNOSIS — I10 ESSENTIAL HYPERTENSION, BENIGN: Primary | ICD-10-CM

## 2020-03-17 DIAGNOSIS — M85.89 OSTEOPENIA OF MULTIPLE SITES: ICD-10-CM

## 2020-03-17 DIAGNOSIS — E78.2 MIXED HYPERLIPIDEMIA: Chronic | ICD-10-CM

## 2020-03-17 PROCEDURE — 1126F AMNT PAIN NOTED NONE PRSNT: CPT | Mod: S$GLB,,, | Performed by: INTERNAL MEDICINE

## 2020-03-17 PROCEDURE — 99214 OFFICE O/P EST MOD 30 MIN: CPT | Mod: S$GLB,,, | Performed by: INTERNAL MEDICINE

## 2020-03-17 PROCEDURE — 99999 PR PBB SHADOW E&M-EST. PATIENT-LVL III: CPT | Mod: PBBFAC,,, | Performed by: INTERNAL MEDICINE

## 2020-03-17 PROCEDURE — 3077F SYST BP >= 140 MM HG: CPT | Mod: CPTII,S$GLB,, | Performed by: INTERNAL MEDICINE

## 2020-03-17 PROCEDURE — 1101F PR PT FALLS ASSESS DOC 0-1 FALLS W/OUT INJ PAST YR: ICD-10-PCS | Mod: CPTII,S$GLB,, | Performed by: INTERNAL MEDICINE

## 2020-03-17 PROCEDURE — 99499 UNLISTED E&M SERVICE: CPT | Mod: S$GLB,,, | Performed by: INTERNAL MEDICINE

## 2020-03-17 PROCEDURE — 99999 PR PBB SHADOW E&M-EST. PATIENT-LVL III: ICD-10-PCS | Mod: PBBFAC,,, | Performed by: INTERNAL MEDICINE

## 2020-03-17 PROCEDURE — 1159F PR MEDICATION LIST DOCUMENTED IN MEDICAL RECORD: ICD-10-PCS | Mod: S$GLB,,, | Performed by: INTERNAL MEDICINE

## 2020-03-17 PROCEDURE — 1159F MED LIST DOCD IN RCRD: CPT | Mod: S$GLB,,, | Performed by: INTERNAL MEDICINE

## 2020-03-17 PROCEDURE — 3078F DIAST BP <80 MM HG: CPT | Mod: CPTII,S$GLB,, | Performed by: INTERNAL MEDICINE

## 2020-03-17 PROCEDURE — 3078F PR MOST RECENT DIASTOLIC BLOOD PRESSURE < 80 MM HG: ICD-10-PCS | Mod: CPTII,S$GLB,, | Performed by: INTERNAL MEDICINE

## 2020-03-17 PROCEDURE — 99499 RISK ADDL DX/OHS AUDIT: ICD-10-PCS | Mod: S$GLB,,, | Performed by: INTERNAL MEDICINE

## 2020-03-17 PROCEDURE — 1101F PT FALLS ASSESS-DOCD LE1/YR: CPT | Mod: CPTII,S$GLB,, | Performed by: INTERNAL MEDICINE

## 2020-03-17 PROCEDURE — 99214 PR OFFICE/OUTPT VISIT, EST, LEVL IV, 30-39 MIN: ICD-10-PCS | Mod: S$GLB,,, | Performed by: INTERNAL MEDICINE

## 2020-03-17 PROCEDURE — 3077F PR MOST RECENT SYSTOLIC BLOOD PRESSURE >= 140 MM HG: ICD-10-PCS | Mod: CPTII,S$GLB,, | Performed by: INTERNAL MEDICINE

## 2020-03-17 PROCEDURE — 1126F PR PAIN SEVERITY QUANTIFIED, NO PAIN PRESENT: ICD-10-PCS | Mod: S$GLB,,, | Performed by: INTERNAL MEDICINE

## 2020-03-17 NOTE — PROGRESS NOTES
Subjective:       Patient ID: Torie Richard is a 74 y.o. female.    Chief Complaint: Hypertension    Pt here for f/u. HLD has been well controlled and tolerating zocor well.     Counseled on exercise goals. She again declines c-scope and other screening modalities for colon ca but is willing to do Fitkit. Understands r/b.     Still using otc sleep aids with relief. Proper use again d/w pt and she understands.    Pt's BP is normally well controlled but high today. Tolerating meds well but didn't take this AM. Pt denies cp/sob/ha/vision or neuro changes. Checking at home and is well controlled.     She has mild osteopenia on DEXA 5/2016. Reiterated importance of ca/d. She is due for updated DEXA.     Hyperlipidemia   Pertinent negatives include no chest pain or shortness of breath.     Review of Systems   Constitutional: Negative for activity change and unexpected weight change.   HENT: Negative for hearing loss, rhinorrhea and trouble swallowing.    Eyes: Negative for discharge and visual disturbance.   Respiratory: Negative for chest tightness, shortness of breath and wheezing.    Cardiovascular: Negative for chest pain and palpitations.   Gastrointestinal: Negative for abdominal pain, blood in stool, constipation, diarrhea and vomiting.   Endocrine: Negative for polydipsia and polyuria.   Genitourinary: Negative for difficulty urinating, dysuria, hematuria and menstrual problem.   Musculoskeletal: Negative for arthralgias, joint swelling and neck pain.   Neurological: Negative for weakness and headaches.   Psychiatric/Behavioral: Negative for confusion and dysphoric mood.       Objective:      Physical Exam   Constitutional: She is oriented to person, place, and time. She appears well-developed and well-nourished.   HENT:   Head: Normocephalic and atraumatic.   Neck: Neck supple. No thyromegaly present.   Cardiovascular: Normal rate, regular rhythm and normal heart sounds.   Pulmonary/Chest: Effort normal and  breath sounds normal.   Abdominal: Soft. Bowel sounds are normal. She exhibits no distension and no mass. There is no tenderness.   Musculoskeletal: She exhibits no edema.   Lymphadenopathy:     She has no cervical adenopathy.   Neurological: She is alert and oriented to person, place, and time. No cranial nerve deficit.   Psychiatric: She has a normal mood and affect. Her behavior is normal.       Assessment:       1. Essential hypertension, benign    2. Mixed hyperlipidemia    3. Colon cancer screening    4. Osteopenia of multiple sites        Plan:       1. Recent labs reviewed    2. Home BP monitoring and f/u 4 wks nursing BP check  3. Fitkit  4. DEXA

## 2020-03-18 ENCOUNTER — PATIENT MESSAGE (OUTPATIENT)
Dept: INTERNAL MEDICINE | Facility: CLINIC | Age: 75
End: 2020-03-18

## 2020-03-22 ENCOUNTER — LAB VISIT (OUTPATIENT)
Dept: LAB | Facility: HOSPITAL | Age: 75
End: 2020-03-22
Attending: INTERNAL MEDICINE
Payer: MEDICARE

## 2020-03-22 DIAGNOSIS — Z12.11 COLON CANCER SCREENING: ICD-10-CM

## 2020-03-22 PROCEDURE — 82274 ASSAY TEST FOR BLOOD FECAL: CPT

## 2020-03-24 ENCOUNTER — PATIENT MESSAGE (OUTPATIENT)
Dept: INTERNAL MEDICINE | Facility: CLINIC | Age: 75
End: 2020-03-24

## 2020-03-27 LAB — HEMOCCULT STL QL IA: NEGATIVE

## 2020-03-31 ENCOUNTER — PATIENT MESSAGE (OUTPATIENT)
Dept: INTERNAL MEDICINE | Facility: CLINIC | Age: 75
End: 2020-03-31

## 2020-04-03 RX ORDER — LOSARTAN POTASSIUM 25 MG/1
TABLET ORAL
Qty: 90 TABLET | Refills: 3 | Status: SHIPPED | OUTPATIENT
Start: 2020-04-03 | End: 2021-03-23

## 2020-04-10 ENCOUNTER — PATIENT MESSAGE (OUTPATIENT)
Dept: INTERNAL MEDICINE | Facility: CLINIC | Age: 75
End: 2020-04-10

## 2020-04-17 ENCOUNTER — PATIENT MESSAGE (OUTPATIENT)
Dept: INTERNAL MEDICINE | Facility: CLINIC | Age: 75
End: 2020-04-17

## 2020-04-18 NOTE — TELEPHONE ENCOUNTER
Sent reply to patient through the patient portal.  Sent message to Dr. Burciaga to review BP readings.

## 2020-04-25 ENCOUNTER — PATIENT MESSAGE (OUTPATIENT)
Dept: INTERNAL MEDICINE | Facility: CLINIC | Age: 75
End: 2020-04-25

## 2020-05-06 ENCOUNTER — HOSPITAL ENCOUNTER (OUTPATIENT)
Dept: RADIOLOGY | Facility: OTHER | Age: 75
Discharge: HOME | End: 2020-05-06
Attending: INTERNAL MEDICINE
Payer: MEDICARE

## 2020-05-06 DIAGNOSIS — M85.89 OSTEOPENIA OF MULTIPLE SITES: ICD-10-CM

## 2020-05-06 PROCEDURE — 77080 DXA BONE DENSITY AXIAL: CPT | Mod: TC

## 2020-05-06 PROCEDURE — 77080 DXA BONE DENSITY AXIAL: CPT | Mod: 26,,, | Performed by: RADIOLOGY

## 2020-05-06 PROCEDURE — 77080 DEXA BONE DENSITY SPINE HIP: ICD-10-PCS | Mod: 26,,, | Performed by: RADIOLOGY

## 2020-05-19 RX ORDER — SIMVASTATIN 20 MG/1
20 TABLET, FILM COATED ORAL NIGHTLY
Qty: 90 TABLET | Refills: 3 | Status: SHIPPED | OUTPATIENT
Start: 2020-05-19 | End: 2021-05-05 | Stop reason: SDUPTHER

## 2020-05-29 ENCOUNTER — CLINICAL SUPPORT (OUTPATIENT)
Dept: INTERNAL MEDICINE | Facility: CLINIC | Age: 75
End: 2020-05-29
Payer: MEDICARE

## 2020-05-29 VITALS — SYSTOLIC BLOOD PRESSURE: 132 MMHG | HEART RATE: 73 BPM | OXYGEN SATURATION: 98 % | DIASTOLIC BLOOD PRESSURE: 78 MMHG

## 2020-05-29 PROCEDURE — 99999 PR PBB SHADOW E&M-EST. PATIENT-LVL II: CPT | Mod: PBBFAC,,,

## 2020-05-29 PROCEDURE — 99999 PR PBB SHADOW E&M-EST. PATIENT-LVL II: ICD-10-PCS | Mod: PBBFAC,,,

## 2020-05-29 NOTE — Clinical Note
Does patient have record of home blood pressure readings yes. Readings have been averaging 100s-120s/60s-70s. Last dose of blood pressure medication was taken at 0700.Patient is asymptomatic. BP: 132/78 , Pulse: 73.

## 2020-05-29 NOTE — PROGRESS NOTES
Torie Richard 74 y.o. female is here today for Blood Pressure check.   History of HTN yes.    Review of patient's allergies indicates:   Allergen Reactions    Meloxicam Rash     Flushing       Creatinine   Date Value Ref Range Status   03/16/2020 0.8 0.5 - 1.4 mg/dL Final     Sodium   Date Value Ref Range Status   03/16/2020 138 136 - 145 mmol/L Final     Potassium   Date Value Ref Range Status   03/16/2020 4.6 3.5 - 5.1 mmol/L Final   ]  Patient verifies taking blood pressure medications on a regular bases at the same time of the day.     Current Outpatient Medications:     acetaminophen (TYLENOL) 500 MG tablet, Take 500 mg by mouth every 6 (six) hours as needed for Pain., Disp: , Rfl:     doxylamine succinate (SLEEP AID, DOXYLAMINE,) 25 mg tablet, Take 25 mg by mouth nightly as needed.  , Disp: , Rfl:     ibuprofen (ADVIL,MOTRIN) 200 MG tablet, Take 200 mg by mouth every 6 (six) hours as needed., Disp: , Rfl:     losartan (COZAAR) 25 MG tablet, TAKE 1 TABLET BY MOUTH EVERY DAY, Disp: 90 tablet, Rfl: 3    melatonin 5 mg Tab, Take 5 mg by mouth. , Disp: , Rfl:     naproxen sodium (ANAPROX) 220 MG tablet, , Disp: , Rfl:     simvastatin (ZOCOR) 20 MG tablet, TAKE 1 TABLET (20 MG TOTAL) BY MOUTH EVERY EVENING., Disp: 90 tablet, Rfl: 3  No current facility-administered medications for this visit.     Facility-Administered Medications Ordered in Other Visits:     0.9%  NaCl infusion, , Intravenous, Continuous, Jr Cantor MD    sodium chloride 0.9% flush 10 mL, 10 mL, Intravenous, PRN, Jr Cantor MD  Does patient have record of home blood pressure readings yes. Readings have been averaging 100s-120s/60s-70s.   Last dose of blood pressure medication was taken at 0700.  Patient is asymptomatic.   BP: 132/78 , Pulse: 73.  Dr. Burciaga notified.

## 2020-07-15 ENCOUNTER — PATIENT MESSAGE (OUTPATIENT)
Dept: INTERNAL MEDICINE | Facility: CLINIC | Age: 75
End: 2020-07-15

## 2020-07-15 DIAGNOSIS — Z12.31 ENCOUNTER FOR SCREENING MAMMOGRAM FOR BREAST CANCER: Primary | ICD-10-CM

## 2020-07-31 ENCOUNTER — HOSPITAL ENCOUNTER (OUTPATIENT)
Dept: RADIOLOGY | Facility: OTHER | Age: 75
Discharge: HOME | End: 2020-07-31
Attending: INTERNAL MEDICINE
Payer: MEDICARE

## 2020-07-31 DIAGNOSIS — Z12.31 ENCOUNTER FOR SCREENING MAMMOGRAM FOR BREAST CANCER: ICD-10-CM

## 2020-07-31 PROCEDURE — 77067 MAMMO DIGITAL SCREENING BILAT WITH TOMOSYNTHESIS_CAD: ICD-10-PCS | Mod: 26,,, | Performed by: RADIOLOGY

## 2020-07-31 PROCEDURE — 77063 MAMMO DIGITAL SCREENING BILAT WITH TOMOSYNTHESIS_CAD: ICD-10-PCS | Mod: 26,,, | Performed by: RADIOLOGY

## 2020-07-31 PROCEDURE — 77067 SCR MAMMO BI INCL CAD: CPT | Mod: TC

## 2020-07-31 PROCEDURE — 77063 BREAST TOMOSYNTHESIS BI: CPT | Mod: 26,,, | Performed by: RADIOLOGY

## 2020-07-31 PROCEDURE — 77067 SCR MAMMO BI INCL CAD: CPT | Mod: 26,,, | Performed by: RADIOLOGY

## 2020-08-03 ENCOUNTER — PATIENT MESSAGE (OUTPATIENT)
Dept: INTERNAL MEDICINE | Facility: CLINIC | Age: 75
End: 2020-08-03

## 2020-08-04 ENCOUNTER — NURSE TRIAGE (OUTPATIENT)
Dept: ADMINISTRATIVE | Facility: CLINIC | Age: 75
End: 2020-08-04

## 2020-08-04 ENCOUNTER — OFFICE VISIT (OUTPATIENT)
Dept: OBSTETRICS AND GYNECOLOGY | Facility: CLINIC | Age: 75
End: 2020-08-04
Payer: MEDICARE

## 2020-08-04 VITALS
SYSTOLIC BLOOD PRESSURE: 138 MMHG | HEIGHT: 64 IN | WEIGHT: 137.38 LBS | DIASTOLIC BLOOD PRESSURE: 82 MMHG | BODY MASS INDEX: 23.45 KG/M2

## 2020-08-04 DIAGNOSIS — B37.31 YEAST INFECTION INVOLVING THE VAGINA AND SURROUNDING AREA: Primary | ICD-10-CM

## 2020-08-04 PROCEDURE — 1126F PR PAIN SEVERITY QUANTIFIED, NO PAIN PRESENT: ICD-10-PCS | Mod: S$GLB,,, | Performed by: NURSE PRACTITIONER

## 2020-08-04 PROCEDURE — 99999 PR PBB SHADOW E&M-EST. PATIENT-LVL III: ICD-10-PCS | Mod: PBBFAC,,, | Performed by: NURSE PRACTITIONER

## 2020-08-04 PROCEDURE — 3079F DIAST BP 80-89 MM HG: CPT | Mod: CPTII,S$GLB,, | Performed by: NURSE PRACTITIONER

## 2020-08-04 PROCEDURE — 3008F BODY MASS INDEX DOCD: CPT | Mod: CPTII,S$GLB,, | Performed by: NURSE PRACTITIONER

## 2020-08-04 PROCEDURE — 1159F MED LIST DOCD IN RCRD: CPT | Mod: S$GLB,,, | Performed by: NURSE PRACTITIONER

## 2020-08-04 PROCEDURE — 99203 PR OFFICE/OUTPT VISIT, NEW, LEVL III, 30-44 MIN: ICD-10-PCS | Mod: S$GLB,,, | Performed by: NURSE PRACTITIONER

## 2020-08-04 PROCEDURE — 3075F PR MOST RECENT SYSTOLIC BLOOD PRESS GE 130-139MM HG: ICD-10-PCS | Mod: CPTII,S$GLB,, | Performed by: NURSE PRACTITIONER

## 2020-08-04 PROCEDURE — 1159F PR MEDICATION LIST DOCUMENTED IN MEDICAL RECORD: ICD-10-PCS | Mod: S$GLB,,, | Performed by: NURSE PRACTITIONER

## 2020-08-04 PROCEDURE — 3079F PR MOST RECENT DIASTOLIC BLOOD PRESSURE 80-89 MM HG: ICD-10-PCS | Mod: CPTII,S$GLB,, | Performed by: NURSE PRACTITIONER

## 2020-08-04 PROCEDURE — 99203 OFFICE O/P NEW LOW 30 MIN: CPT | Mod: S$GLB,,, | Performed by: NURSE PRACTITIONER

## 2020-08-04 PROCEDURE — 1126F AMNT PAIN NOTED NONE PRSNT: CPT | Mod: S$GLB,,, | Performed by: NURSE PRACTITIONER

## 2020-08-04 PROCEDURE — 1101F PR PT FALLS ASSESS DOC 0-1 FALLS W/OUT INJ PAST YR: ICD-10-PCS | Mod: CPTII,S$GLB,, | Performed by: NURSE PRACTITIONER

## 2020-08-04 PROCEDURE — 3075F SYST BP GE 130 - 139MM HG: CPT | Mod: CPTII,S$GLB,, | Performed by: NURSE PRACTITIONER

## 2020-08-04 PROCEDURE — 3008F PR BODY MASS INDEX (BMI) DOCUMENTED: ICD-10-PCS | Mod: CPTII,S$GLB,, | Performed by: NURSE PRACTITIONER

## 2020-08-04 PROCEDURE — 99999 PR PBB SHADOW E&M-EST. PATIENT-LVL III: CPT | Mod: PBBFAC,,, | Performed by: NURSE PRACTITIONER

## 2020-08-04 PROCEDURE — 1101F PT FALLS ASSESS-DOCD LE1/YR: CPT | Mod: CPTII,S$GLB,, | Performed by: NURSE PRACTITIONER

## 2020-08-04 RX ORDER — FLUCONAZOLE 200 MG/1
200 TABLET ORAL
Qty: 2 TABLET | Refills: 0 | Status: SHIPPED | OUTPATIENT
Start: 2020-08-04 | End: 2020-08-04

## 2020-08-04 RX ORDER — PREDNISONE 10 MG/1
TABLET ORAL DAILY
Qty: 14 TABLET | Refills: 0 | Status: SHIPPED | OUTPATIENT
Start: 2020-08-04 | End: 2020-08-11

## 2020-08-04 RX ORDER — IBUPROFEN 200 MG
CAPSULE ORAL
COMMUNITY
Start: 2020-05-21 | End: 2020-11-18

## 2020-08-04 RX ORDER — FLUCONAZOLE 200 MG/1
200 TABLET ORAL
Qty: 3 TABLET | Refills: 0 | Status: SHIPPED | OUTPATIENT
Start: 2020-08-04 | End: 2020-08-11

## 2020-08-04 RX ORDER — CLOTRIMAZOLE 1 %
CREAM (GRAM) TOPICAL 2 TIMES DAILY
Qty: 1 TUBE | Refills: 1 | Status: SHIPPED | OUTPATIENT
Start: 2020-08-04 | End: 2020-11-18

## 2020-08-04 RX ORDER — MULTIVIT WITH MINERALS/HERBS
TABLET ORAL
COMMUNITY
Start: 2020-05-15 | End: 2020-11-18

## 2020-08-04 NOTE — TELEPHONE ENCOUNTER
Spoke with Conor at [facility] pharmacy states that she received e-scripts.  Patient notified no further assistance needed.     Reason for Disposition   [1] Prescription not at pharmacy AND [2] was prescribed by PCP recently    Protocols used: MEDICATION QUESTION CALL-A-AH

## 2020-08-04 NOTE — PROGRESS NOTES
Torie Richard is a 75 y.o. female  presents with complaint of a yeast infection. Used a new bath gel and started having a reaction 4 days ago that has gradually worsened. She used OTC miconazole and a cream with no relief. + itching + pain + swelling. No abnormal vaginal discharge. Similar episode back in  with Dr. Cowan, area extended to her navel at that time.     Past Medical History:   Diagnosis Date    Hyperlipidemia     Hypertension      Past Surgical History:   Procedure Laterality Date     SECTION      ERCP N/A 2019    Procedure: ERCP (ENDOSCOPIC RETROGRADE CHOLANGIOPANCREATOGRAPHY);  Surgeon: Jr Cantor MD;  Location: [address]);  Service: Endoscopy;  Laterality: N/A;    ERCP N/A 2019    Procedure: ERCP (ENDOSCOPIC RETROGRADE CHOLANGIOPANCREATOGRAPHY);  Surgeon: Jr Cantor MD;  Location: [address]);  Service: Endoscopy;  Laterality: N/A;    EXCISIONAL HEMORRHOIDECTOMY      LAPAROSCOPIC CHOLECYSTECTOMY N/A 2019    Procedure: CHOLECYSTECTOMY, LAPAROSCOPIC;  Surgeon: Tayo Guadarrama MD;  Location: [facility] OR [address] [place] [address];  Service: General;  Laterality: N/A;    TONSILLECTOMY       Social History     Tobacco Use    Smoking status: Former Smoker     Types: Cigarettes     Quit date: 10/18/1981     Years since quittin.8    Smokeless tobacco: Never Used   Substance Use Topics    Alcohol use: Yes     Alcohol/week: 1.7 standard drinks     Types: 2 Standard drinks or equivalent per week     Frequency: Never     Drinks per session: Patient refused     Binge frequency: Never     Comment: social     Drug use: No     Family History   Problem Relation Age of Onset    Hypertension Mother     Hyperlipidemia Mother     Sudden death Mother     Heart disease Father         MI at 58    Hypertension Father     Hyperlipidemia Father     Hypertension Sister     Hyperlipidemia Sister     Depression Sister     COPD Brother     Breast cancer Maternal  "Aunt     Breast cancer Paternal Aunt     Colon cancer Neg Hx     Ovarian cancer Neg Hx      OB History    Para Term  AB Living   2 2 2         SAB TAB Ectopic Multiple Live Births                  # Outcome Date GA Lbr John/2nd Weight Sex Delivery Anes PTL Lv   2 Term            1 Term                /82   Ht 5' 4" (1.626 m)   Wt 62.3 kg (137 lb 5.6 oz)   BMI 23.58 kg/m²     ROS:  GENERAL: No fever, chills, fatigability or weight loss.  VULVAR: + pain, no lesions and + itching.  VAGINAL: No relaxation, + itching, no discharge, no abnormal bleeding and no lesions.  ABDOMEN: No abdominal pain. Denies nausea. Denies vomiting. No diarrhea. No constipation  BREAST: Denies pain. No lumps. No discharge.  URINARY: No incontinence, no nocturia, no frequency and no dysuria.  CARDIOVASCULAR: No chest pain. No shortness of breath. No leg cramps.  NEUROLOGICAL: No headaches. No vision changes.    PHYSICAL EXAM:  VULVA: normal appearing vulva with no masses, SEVERE ERYTHEMA AND SWELLING ON BILATERAL VULVA EXTENDING TO MONS  VAGINA: normal appearing vagina with normal color. NO ABNORMAL DISCHARGE-AFFIRM NOT COLLECTED , no lesions   CERVIX: normal appearing cervix without discharge or lesions   UTERUS: uterus is normal size, shape, consistency and nontender   ADNEXA: normal adnexa in size, nontender and no masses    ASSESSMENT and PLAN:    ICD-10-CM ICD-9-CM    1. Yeast infection involving the vagina and surrounding area  B37.3 112.1 fluconazole (DIFLUCAN) 200 MG Tab      clotrimazole (LOTRIMIN) 1 % cream      predniSONE (DELTASONE) 10 mg tablet pack      DISCONTINUED: fluconazole (DIFLUCAN) 200 MG Tab     Rx Diflucan q 72 hours x 3 doses  Lotrimin cream prn for itch  Prednisone PO x 1 week for swelling    Patient was counseled today on vaginitis prevention including :  a. avoiding feminine products such as deoderant soaps, body wash, bubble bath, douches, scented toilet paper, deoderant tampons or pads, " feminine wipes, chronic pad use, etc.  b. avoiding other vulvovaginal irritants such as long hot baths, humidity, tight, synthetic clothing, chlorine and sitting around in wet bathing suits  c. wearing cotton underwear, avoiding thong underwear and no underwear to bed  d. taking showers instead of baths and use a hair dryer on cool setting afterwards to dry  e. wearing cotton to exercise and shower immediately after exercise and change clothes  f. using polyurethane condoms without spermicide if sexually active and symptoms are triggered by intercourse    FOLLOW UP: PRN lack of improvement.

## 2020-11-10 ENCOUNTER — PATIENT MESSAGE (OUTPATIENT)
Dept: INTERNAL MEDICINE | Facility: CLINIC | Age: 75
End: 2020-11-10

## 2020-11-10 DIAGNOSIS — M79.652 LEFT THIGH PAIN: Primary | ICD-10-CM

## 2020-11-11 NOTE — TELEPHONE ENCOUNTER
Routine office visit should suffice for now and not require specialist care. That being said if you would like to see an orthopedist, you may.

## 2020-11-18 ENCOUNTER — OFFICE VISIT (OUTPATIENT)
Dept: INTERNAL MEDICINE | Facility: CLINIC | Age: 75
End: 2020-11-18
Payer: MEDICARE

## 2020-11-18 VITALS
BODY MASS INDEX: 24.13 KG/M2 | HEIGHT: 64 IN | WEIGHT: 141.31 LBS | HEART RATE: 70 BPM | OXYGEN SATURATION: 99 % | SYSTOLIC BLOOD PRESSURE: 152 MMHG | DIASTOLIC BLOOD PRESSURE: 86 MMHG

## 2020-11-18 DIAGNOSIS — M54.16 LUMBAR RADICULOPATHY: ICD-10-CM

## 2020-11-18 DIAGNOSIS — S76.302A LEFT HAMSTRING INJURY, INITIAL ENCOUNTER: Primary | ICD-10-CM

## 2020-11-18 PROCEDURE — 3079F PR MOST RECENT DIASTOLIC BLOOD PRESSURE 80-89 MM HG: ICD-10-PCS | Mod: CPTII,S$GLB,, | Performed by: INTERNAL MEDICINE

## 2020-11-18 PROCEDURE — 1125F PR PAIN SEVERITY QUANTIFIED, PAIN PRESENT: ICD-10-PCS | Mod: S$GLB,,, | Performed by: INTERNAL MEDICINE

## 2020-11-18 PROCEDURE — 1125F AMNT PAIN NOTED PAIN PRSNT: CPT | Mod: S$GLB,,, | Performed by: INTERNAL MEDICINE

## 2020-11-18 PROCEDURE — 99999 PR PBB SHADOW E&M-EST. PATIENT-LVL IV: ICD-10-PCS | Mod: PBBFAC,,, | Performed by: INTERNAL MEDICINE

## 2020-11-18 PROCEDURE — 3077F PR MOST RECENT SYSTOLIC BLOOD PRESSURE >= 140 MM HG: ICD-10-PCS | Mod: CPTII,S$GLB,, | Performed by: INTERNAL MEDICINE

## 2020-11-18 PROCEDURE — 3079F DIAST BP 80-89 MM HG: CPT | Mod: CPTII,S$GLB,, | Performed by: INTERNAL MEDICINE

## 2020-11-18 PROCEDURE — 99213 PR OFFICE/OUTPT VISIT, EST, LEVL III, 20-29 MIN: ICD-10-PCS | Mod: S$GLB,,, | Performed by: INTERNAL MEDICINE

## 2020-11-18 PROCEDURE — 1101F PT FALLS ASSESS-DOCD LE1/YR: CPT | Mod: CPTII,S$GLB,, | Performed by: INTERNAL MEDICINE

## 2020-11-18 PROCEDURE — 3288F PR FALLS RISK ASSESSMENT DOCUMENTED: ICD-10-PCS | Mod: CPTII,S$GLB,, | Performed by: INTERNAL MEDICINE

## 2020-11-18 PROCEDURE — 3008F BODY MASS INDEX DOCD: CPT | Mod: CPTII,S$GLB,, | Performed by: INTERNAL MEDICINE

## 2020-11-18 PROCEDURE — 99999 PR PBB SHADOW E&M-EST. PATIENT-LVL IV: CPT | Mod: PBBFAC,,, | Performed by: INTERNAL MEDICINE

## 2020-11-18 PROCEDURE — 3288F FALL RISK ASSESSMENT DOCD: CPT | Mod: CPTII,S$GLB,, | Performed by: INTERNAL MEDICINE

## 2020-11-18 PROCEDURE — 1101F PR PT FALLS ASSESS DOC 0-1 FALLS W/OUT INJ PAST YR: ICD-10-PCS | Mod: CPTII,S$GLB,, | Performed by: INTERNAL MEDICINE

## 2020-11-18 PROCEDURE — 1159F MED LIST DOCD IN RCRD: CPT | Mod: S$GLB,,, | Performed by: INTERNAL MEDICINE

## 2020-11-18 PROCEDURE — 3077F SYST BP >= 140 MM HG: CPT | Mod: CPTII,S$GLB,, | Performed by: INTERNAL MEDICINE

## 2020-11-18 PROCEDURE — 99213 OFFICE O/P EST LOW 20 MIN: CPT | Mod: S$GLB,,, | Performed by: INTERNAL MEDICINE

## 2020-11-18 PROCEDURE — 3008F PR BODY MASS INDEX (BMI) DOCUMENTED: ICD-10-PCS | Mod: CPTII,S$GLB,, | Performed by: INTERNAL MEDICINE

## 2020-11-18 PROCEDURE — 1159F PR MEDICATION LIST DOCUMENTED IN MEDICAL RECORD: ICD-10-PCS | Mod: S$GLB,,, | Performed by: INTERNAL MEDICINE

## 2020-11-18 RX ORDER — BISACODYL 5 MG/1
TABLET, COATED ORAL
COMMUNITY
Start: 2020-10-01 | End: 2021-05-04

## 2020-11-18 NOTE — PROGRESS NOTES
Subjective:       Patient ID: Torie Richard is a 75 y.o. female.    Chief Complaint: Left Leg pain    Pt c/o pain in back of L thigh for several weeks. She was doing a lot of cleaning prior to onset but does not remember distinct injury or inciting event otherwise. Using advil with some relief--she is taking 400-600mg ibuprofen per day as well as 2 aleve per day. Some days are better than other. Pain sometimes radiates from below L buttock down to behind L knee. No swelling. Worse when kneeling and getting up as well as going upstairs. She has chronic mild low back pain but doesn't feel it is more than normal. No weakness/paresthesias. No bowel/bladder issues. No saddle anesthesia.     Review of Systems   Constitutional: Negative for fever.   Respiratory: Negative for shortness of breath.    Cardiovascular: Negative for chest pain, palpitations and leg swelling.   Musculoskeletal: Negative for back pain.   Neurological: Negative for weakness and numbness.   Psychiatric/Behavioral: Negative for dysphoric mood.         Objective:      Physical Exam  Constitutional:       Appearance: Normal appearance.   Neck:      Musculoskeletal: Normal range of motion and neck supple.   Cardiovascular:      Rate and Rhythm: Normal rate and regular rhythm.      Heart sounds: Normal heart sounds.   Pulmonary:      Effort: Pulmonary effort is normal.      Breath sounds: Normal breath sounds.   Musculoskeletal:      Right shoulder: She exhibits normal range of motion and no tenderness.      Lumbar back: Normal. She exhibits normal range of motion, no tenderness and no bony tenderness.   Neurological:      General: No focal deficit present.      Mental Status: She is alert and oriented to person, place, and time.      Sensory: Sensation is intact.      Motor: No weakness.      Deep Tendon Reflexes:      Reflex Scores:       Patellar reflexes are 2+ on the right side and 2+ on the left side.       Achilles reflexes are 2+ on the right  side and 2+ on the left side.  Psychiatric:         Mood and Affect: Mood normal.         Assessment:       1. Left hamstring injury, initial encounter    2. Lumbar radiculopathy        Plan:       1. Some features of radiculopathy but more features of hamstring strain; will refer to sports med  2. Cautious use of NSAIDs  3. Offered PT but she wants to hold off until seeing sports med first

## 2020-11-20 NOTE — [employer] PATIENT INSTRUCTIONS
Discharge Summary/Instructions after an Endoscopic Procedure  Patient Name: Torie Richard  Patient MRN: [id number]  Patient YOB: 1945  Monday, May 06, 2019  Jr Cantor MD  RESTRICTIONS:  During your procedure today, you received medications for sedation.  These   medications may affect your judgment, balance and coordination.  Therefore,   for 24 hours, you have the following restrictions:   - DO NOT drive a car, operate machinery, make legal/financial decisions,   sign important papers or drink alcohol.    ACTIVITY:  Today: no heavy lifting, straining or running due to procedural   sedation/anesthesia.  The following day: return to full activity including work.  DIET:  Eat and drink normally unless instructed otherwise.     TREATMENT FOR COMMON SIDE EFFECTS:  - Mild abdominal pain, nausea, belching, bloating or excessive gas:  rest,   eat lightly and use a heating pad.  - Sore Throat: treat with throat lozenges and/or gargle with warm salt   water.  - Because air was used during the procedure, expelling large amounts of air   from your rectum or belching is normal.  - If a bowel prep was taken, you may not have a bowel movement for 1-3 days.    This is normal.  SYMPTOMS TO WATCH FOR AND REPORT TO YOUR PHYSICIAN:  1. Abdominal pain or bloating, other than gas cramps.  2. Chest pain.  3. Back pain.  4. Signs of infection such as: chills or fever occurring within 24 hours   after the procedure.  5. Rectal bleeding, which would show as bright red, maroon, or black stools.   (A tablespoon of blood from the rectum is not serious, especially if   hemorrhoids are present.)  6. Vomiting.  7. Weakness or dizziness.  GO DIRECTLY TO THE NEAREST EMERGENCY ROOM IF YOU HAVE ANY OF THE FOLLOWING:      Difficulty breathing              Chills and/or fever over 101 F   Persistent vomiting and/or vomiting blood   Severe abdominal pain   Severe chest pain   Black, tarry stools   Bleeding- more than one tablespoon   Any  Discussed with daughter.  Patient having gradual decline in neurological symptoms including gait abnormality, speech difficulty and perception changes.  In addition depression has progressed.  Will get an MRI for further evaluation due to declining neurological symptoms.  She will start her bupropion and refer to behavioral health for further management of medications.    other symptom or condition that you feel may need urgent attention  Your doctor recommends these additional instructions:  If any biopsies were taken, your doctors clinic will contact you in 1 to 2   weeks with any results.  - [facility]   - Resume previous diet.   - Continue present medications.   - Proceed with plans for choelcystectomy as previously scheduled.   - Repeat ERCP in 4 weeks to remove stent and sweep duct.  For questions, problems or results please call your physician - Jr Cantor MD at Work:  [phone].  [facility] [place], EMERGENCY ROOM PHONE NUMBER: [phone]  IF A COMPLICATION OR EMERGENCY SITUATION ARISES AND YOU ARE UNABLE TO REACH   YOUR PHYSICIAN - GO DIRECTLY TO THE EMERGENCY ROOM.  Jr Cantor MD  5/6/2019 2:30:19 PM  This report has been verified and signed electronically.  [employer]

## 2020-12-02 ENCOUNTER — CLINICAL SUPPORT (OUTPATIENT)
Dept: INTERNAL MEDICINE | Facility: CLINIC | Age: 75
End: 2020-12-02
Payer: MEDICARE

## 2020-12-02 ENCOUNTER — TELEPHONE (OUTPATIENT)
Dept: INTERNAL MEDICINE | Facility: CLINIC | Age: 75
End: 2020-12-02

## 2020-12-02 VITALS — SYSTOLIC BLOOD PRESSURE: 136 MMHG | OXYGEN SATURATION: 99 % | DIASTOLIC BLOOD PRESSURE: 78 MMHG | HEART RATE: 83 BPM

## 2020-12-02 PROCEDURE — 99999 PR PBB SHADOW E&M-EST. PATIENT-LVL II: ICD-10-PCS | Mod: PBBFAC,,,

## 2020-12-02 PROCEDURE — 99999 PR PBB SHADOW E&M-EST. PATIENT-LVL II: CPT | Mod: PBBFAC,,,

## 2020-12-02 NOTE — PROGRESS NOTES
Torie Richard 75 y.o. female is here today for Blood Pressure check.   History of HTN yes.    Review of patient's allergies indicates:   Allergen Reactions    Meloxicam Rash     Flushing       Creatinine   Date Value Ref Range Status   03/16/2020 0.8 0.5 - 1.4 mg/dL Final     Sodium   Date Value Ref Range Status   03/16/2020 138 136 - 145 mmol/L Final     Potassium   Date Value Ref Range Status   03/16/2020 4.6 3.5 - 5.1 mmol/L Final   ]  Patient verifies taking blood pressure medications on a regular bases at the same time of the day.     Current Outpatient Medications:     losartan (COZAAR) 25 MG tablet, TAKE 1 TABLET BY MOUTH EVERY DAY, Disp: 90 tablet, Rfl: 3    acetaminophen (TYLENOL) 500 MG tablet, Take 500 mg by mouth every 6 (six) hours as needed for Pain., Disp: , Rfl:     ibuprofen (ADVIL,MOTRIN) 200 MG tablet, Take 200 mg by mouth every 6 (six) hours as needed., Disp: , Rfl:     melatonin 5 mg Tab, Take 5 mg by mouth. , Disp: , Rfl:     multivitamin-minerals-lutein (MULTIVITAMIN 50 PLUS) Tab, , Disp: , Rfl:     naproxen sodium (ANAPROX) 220 MG tablet, , Disp: , Rfl:     simvastatin (ZOCOR) 20 MG tablet, TAKE 1 TABLET (20 MG TOTAL) BY MOUTH EVERY EVENING., Disp: 90 tablet, Rfl: 3  No current facility-administered medications for this visit.     Facility-Administered Medications Ordered in Other Visits:     0.9%  NaCl infusion, , Intravenous, Continuous, Jr Cantor MD    sodium chloride 0.9% flush 10 mL, 10 mL, Intravenous, PRN, Jr Cantor MD  Does patient have record of home blood pressure readings yes. Readings have been 130s/70s and bringing log to office.  Last dose of blood pressure medication was taken at 5:51 am.  Patient is asymptomatic.   Pt denies h/a, dizziness, shortness of breath, chest pain, numbness and tingling of hands and feet.    BP: 136/78 , Pulse: 83.  Patient's self monitor during nurse visit 147/86    Dr. Burciaga notified.

## 2020-12-02 NOTE — TELEPHONE ENCOUNTER
Does patient have record of home blood pressure readings yes. Readings have been 130s/70s and bringing log to office.  Last dose of blood pressure medication was taken at 5:51 am.  Patient is asymptomatic.   Pt denies h/a, dizziness, shortness of breath, chest pain, numbness and tingling of hands and feet.    BP: 136/78 , Pulse: 83.  Patient's self monitor during nurse visit 147/86    Dr. Burciaga notified.

## 2021-01-09 ENCOUNTER — IMMUNIZATION (OUTPATIENT)
Dept: INTERNAL MEDICINE | Facility: CLINIC | Age: 76
End: 2021-01-09
Payer: MEDICARE

## 2021-01-09 DIAGNOSIS — Z23 NEED FOR VACCINATION: ICD-10-CM

## 2021-01-09 PROCEDURE — 91300 COVID-19, MRNA, LNP-S, PF, 30 MCG/0.3 ML DOSE VACCINE: CPT | Mod: PBBFAC | Performed by: FAMILY MEDICINE

## 2021-01-12 ENCOUNTER — OFFICE VISIT (OUTPATIENT)
Dept: SPORTS MEDICINE | Facility: CLINIC | Age: 76
End: 2021-01-12
Payer: MEDICARE

## 2021-01-12 VITALS
BODY MASS INDEX: 24.24 KG/M2 | HEART RATE: 72 BPM | HEIGHT: 64 IN | SYSTOLIC BLOOD PRESSURE: 157 MMHG | WEIGHT: 142 LBS | DIASTOLIC BLOOD PRESSURE: 81 MMHG

## 2021-01-12 DIAGNOSIS — M79.10 MYALGIA: ICD-10-CM

## 2021-01-12 DIAGNOSIS — M99.05 SOMATIC DYSFUNCTION OF PELVIC REGION: ICD-10-CM

## 2021-01-12 DIAGNOSIS — M70.72 ISCHIAL BURSITIS OF LEFT SIDE: Primary | ICD-10-CM

## 2021-01-12 DIAGNOSIS — M99.03 SOMATIC DYSFUNCTION OF LUMBAR REGION: ICD-10-CM

## 2021-01-12 DIAGNOSIS — M54.59 MECHANICAL LOW BACK PAIN: ICD-10-CM

## 2021-01-12 DIAGNOSIS — M99.02 SOMATIC DYSFUNCTION OF THORACIC REGION: ICD-10-CM

## 2021-01-12 DIAGNOSIS — M99.04 SACRAL REGION SOMATIC DYSFUNCTION: ICD-10-CM

## 2021-01-12 PROCEDURE — 99203 PR OFFICE/OUTPT VISIT, NEW, LEVL III, 30-44 MIN: ICD-10-PCS | Mod: 25,S$GLB,, | Performed by: NEUROMUSCULOSKELETAL MEDICINE & OMM

## 2021-01-12 PROCEDURE — 3077F PR MOST RECENT SYSTOLIC BLOOD PRESSURE >= 140 MM HG: ICD-10-PCS | Mod: CPTII,S$GLB,, | Performed by: NEUROMUSCULOSKELETAL MEDICINE & OMM

## 2021-01-12 PROCEDURE — 98926 OSTEOPATH MANJ 3-4 REGIONS: CPT | Mod: S$GLB,,, | Performed by: NEUROMUSCULOSKELETAL MEDICINE & OMM

## 2021-01-12 PROCEDURE — 1125F PR PAIN SEVERITY QUANTIFIED, PAIN PRESENT: ICD-10-PCS | Mod: S$GLB,,, | Performed by: NEUROMUSCULOSKELETAL MEDICINE & OMM

## 2021-01-12 PROCEDURE — 1125F AMNT PAIN NOTED PAIN PRSNT: CPT | Mod: S$GLB,,, | Performed by: NEUROMUSCULOSKELETAL MEDICINE & OMM

## 2021-01-12 PROCEDURE — 97110 THERAPEUTIC EXERCISES: CPT | Mod: GP,S$GLB,, | Performed by: NEUROMUSCULOSKELETAL MEDICINE & OMM

## 2021-01-12 PROCEDURE — 99999 PR PBB SHADOW E&M-EST. PATIENT-LVL III: ICD-10-PCS | Mod: PBBFAC,,, | Performed by: NEUROMUSCULOSKELETAL MEDICINE & OMM

## 2021-01-12 PROCEDURE — 99999 PR PBB SHADOW E&M-EST. PATIENT-LVL III: CPT | Mod: PBBFAC,,, | Performed by: NEUROMUSCULOSKELETAL MEDICINE & OMM

## 2021-01-12 PROCEDURE — 3077F SYST BP >= 140 MM HG: CPT | Mod: CPTII,S$GLB,, | Performed by: NEUROMUSCULOSKELETAL MEDICINE & OMM

## 2021-01-12 PROCEDURE — 3008F BODY MASS INDEX DOCD: CPT | Mod: CPTII,S$GLB,, | Performed by: NEUROMUSCULOSKELETAL MEDICINE & OMM

## 2021-01-12 PROCEDURE — 1159F MED LIST DOCD IN RCRD: CPT | Mod: S$GLB,,, | Performed by: NEUROMUSCULOSKELETAL MEDICINE & OMM

## 2021-01-12 PROCEDURE — 1159F PR MEDICATION LIST DOCUMENTED IN MEDICAL RECORD: ICD-10-PCS | Mod: S$GLB,,, | Performed by: NEUROMUSCULOSKELETAL MEDICINE & OMM

## 2021-01-12 PROCEDURE — 3008F PR BODY MASS INDEX (BMI) DOCUMENTED: ICD-10-PCS | Mod: CPTII,S$GLB,, | Performed by: NEUROMUSCULOSKELETAL MEDICINE & OMM

## 2021-01-12 PROCEDURE — 98926 PR OSTEOPATHIC MANIP,3-4 BODY REGN: ICD-10-PCS | Mod: S$GLB,,, | Performed by: NEUROMUSCULOSKELETAL MEDICINE & OMM

## 2021-01-12 PROCEDURE — 3079F PR MOST RECENT DIASTOLIC BLOOD PRESSURE 80-89 MM HG: ICD-10-PCS | Mod: CPTII,S$GLB,, | Performed by: NEUROMUSCULOSKELETAL MEDICINE & OMM

## 2021-01-12 PROCEDURE — 3079F DIAST BP 80-89 MM HG: CPT | Mod: CPTII,S$GLB,, | Performed by: NEUROMUSCULOSKELETAL MEDICINE & OMM

## 2021-01-12 PROCEDURE — 97110 PR THERAPEUTIC EXERCISES: ICD-10-PCS | Mod: GP,S$GLB,, | Performed by: NEUROMUSCULOSKELETAL MEDICINE & OMM

## 2021-01-12 PROCEDURE — 99203 OFFICE O/P NEW LOW 30 MIN: CPT | Mod: 25,S$GLB,, | Performed by: NEUROMUSCULOSKELETAL MEDICINE & OMM

## 2021-01-12 RX ORDER — LORATADINE 10 MG/1
10 TABLET ORAL DAILY
COMMUNITY
End: 2023-01-12

## 2021-01-29 ENCOUNTER — PATIENT MESSAGE (OUTPATIENT)
Dept: ADMINISTRATIVE | Facility: HOSPITAL | Age: 76
End: 2021-01-29

## 2021-01-29 ENCOUNTER — HOSPITAL ENCOUNTER (OUTPATIENT)
Dept: RADIOLOGY | Facility: HOSPITAL | Age: 76
Discharge: HOME | End: 2021-01-29
Attending: NEUROMUSCULOSKELETAL MEDICINE & OMM
Payer: MEDICARE

## 2021-01-29 ENCOUNTER — OFFICE VISIT (OUTPATIENT)
Dept: SPORTS MEDICINE | Facility: CLINIC | Age: 76
End: 2021-01-29
Payer: MEDICARE

## 2021-01-29 VITALS
SYSTOLIC BLOOD PRESSURE: 161 MMHG | BODY MASS INDEX: 24.24 KG/M2 | DIASTOLIC BLOOD PRESSURE: 82 MMHG | HEIGHT: 64 IN | HEART RATE: 78 BPM | WEIGHT: 142 LBS

## 2021-01-29 DIAGNOSIS — M79.671 PAIN OF RIGHT HEEL: ICD-10-CM

## 2021-01-29 DIAGNOSIS — M76.61 TENDONITIS, ACHILLES, RIGHT: Primary | ICD-10-CM

## 2021-01-29 DIAGNOSIS — M70.72 ISCHIAL BURSITIS OF LEFT SIDE: ICD-10-CM

## 2021-01-29 DIAGNOSIS — M99.06 SOMATIC DYSFUNCTION OF LOWER EXTREMITY: ICD-10-CM

## 2021-01-29 DIAGNOSIS — M54.59 MECHANICAL LOW BACK PAIN: ICD-10-CM

## 2021-01-29 PROCEDURE — 1101F PT FALLS ASSESS-DOCD LE1/YR: CPT | Mod: CPTII,S$GLB,, | Performed by: NEUROMUSCULOSKELETAL MEDICINE & OMM

## 2021-01-29 PROCEDURE — 97110 THERAPEUTIC EXERCISES: CPT | Mod: GP,S$GLB,, | Performed by: NEUROMUSCULOSKELETAL MEDICINE & OMM

## 2021-01-29 PROCEDURE — 73650 X-RAY EXAM OF HEEL: CPT | Mod: TC,RT

## 2021-01-29 PROCEDURE — 3288F PR FALLS RISK ASSESSMENT DOCUMENTED: ICD-10-PCS | Mod: CPTII,S$GLB,, | Performed by: NEUROMUSCULOSKELETAL MEDICINE & OMM

## 2021-01-29 PROCEDURE — 99214 PR OFFICE/OUTPT VISIT, EST, LEVL IV, 30-39 MIN: ICD-10-PCS | Mod: 25,S$GLB,, | Performed by: NEUROMUSCULOSKELETAL MEDICINE & OMM

## 2021-01-29 PROCEDURE — 1125F AMNT PAIN NOTED PAIN PRSNT: CPT | Mod: S$GLB,,, | Performed by: NEUROMUSCULOSKELETAL MEDICINE & OMM

## 2021-01-29 PROCEDURE — 1101F PR PT FALLS ASSESS DOC 0-1 FALLS W/OUT INJ PAST YR: ICD-10-PCS | Mod: CPTII,S$GLB,, | Performed by: NEUROMUSCULOSKELETAL MEDICINE & OMM

## 2021-01-29 PROCEDURE — 98925 PR OSTEOPATHIC MANIP,1-2 BODY REGN: ICD-10-PCS | Mod: S$GLB,,, | Performed by: NEUROMUSCULOSKELETAL MEDICINE & OMM

## 2021-01-29 PROCEDURE — 99999 PR PBB SHADOW E&M-EST. PATIENT-LVL III: ICD-10-PCS | Mod: PBBFAC,,, | Performed by: NEUROMUSCULOSKELETAL MEDICINE & OMM

## 2021-01-29 PROCEDURE — 97110 PR THERAPEUTIC EXERCISES: ICD-10-PCS | Mod: GP,S$GLB,, | Performed by: NEUROMUSCULOSKELETAL MEDICINE & OMM

## 2021-01-29 PROCEDURE — 3288F FALL RISK ASSESSMENT DOCD: CPT | Mod: CPTII,S$GLB,, | Performed by: NEUROMUSCULOSKELETAL MEDICINE & OMM

## 2021-01-29 PROCEDURE — 99999 PR PBB SHADOW E&M-EST. PATIENT-LVL III: CPT | Mod: PBBFAC,,, | Performed by: NEUROMUSCULOSKELETAL MEDICINE & OMM

## 2021-01-29 PROCEDURE — 3077F SYST BP >= 140 MM HG: CPT | Mod: CPTII,S$GLB,, | Performed by: NEUROMUSCULOSKELETAL MEDICINE & OMM

## 2021-01-29 PROCEDURE — 1159F PR MEDICATION LIST DOCUMENTED IN MEDICAL RECORD: ICD-10-PCS | Mod: S$GLB,,, | Performed by: NEUROMUSCULOSKELETAL MEDICINE & OMM

## 2021-01-29 PROCEDURE — 99214 OFFICE O/P EST MOD 30 MIN: CPT | Mod: 25,S$GLB,, | Performed by: NEUROMUSCULOSKELETAL MEDICINE & OMM

## 2021-01-29 PROCEDURE — 3008F BODY MASS INDEX DOCD: CPT | Mod: CPTII,S$GLB,, | Performed by: NEUROMUSCULOSKELETAL MEDICINE & OMM

## 2021-01-29 PROCEDURE — 98925 OSTEOPATH MANJ 1-2 REGIONS: CPT | Mod: S$GLB,,, | Performed by: NEUROMUSCULOSKELETAL MEDICINE & OMM

## 2021-01-29 PROCEDURE — 3008F PR BODY MASS INDEX (BMI) DOCUMENTED: ICD-10-PCS | Mod: CPTII,S$GLB,, | Performed by: NEUROMUSCULOSKELETAL MEDICINE & OMM

## 2021-01-29 PROCEDURE — 3077F PR MOST RECENT SYSTOLIC BLOOD PRESSURE >= 140 MM HG: ICD-10-PCS | Mod: CPTII,S$GLB,, | Performed by: NEUROMUSCULOSKELETAL MEDICINE & OMM

## 2021-01-29 PROCEDURE — 3079F DIAST BP 80-89 MM HG: CPT | Mod: CPTII,S$GLB,, | Performed by: NEUROMUSCULOSKELETAL MEDICINE & OMM

## 2021-01-29 PROCEDURE — 1125F PR PAIN SEVERITY QUANTIFIED, PAIN PRESENT: ICD-10-PCS | Mod: S$GLB,,, | Performed by: NEUROMUSCULOSKELETAL MEDICINE & OMM

## 2021-01-29 PROCEDURE — 73650 X-RAY EXAM OF HEEL: CPT | Mod: 26,RT,, | Performed by: RADIOLOGY

## 2021-01-29 PROCEDURE — 73650 XR CALCANEUS 2 VIEW RIGHT: ICD-10-PCS | Mod: 26,RT,, | Performed by: RADIOLOGY

## 2021-01-29 PROCEDURE — 1159F MED LIST DOCD IN RCRD: CPT | Mod: S$GLB,,, | Performed by: NEUROMUSCULOSKELETAL MEDICINE & OMM

## 2021-01-29 PROCEDURE — 3079F PR MOST RECENT DIASTOLIC BLOOD PRESSURE 80-89 MM HG: ICD-10-PCS | Mod: CPTII,S$GLB,, | Performed by: NEUROMUSCULOSKELETAL MEDICINE & OMM

## 2021-01-29 RX ORDER — NAPROXEN 500 MG/1
500 TABLET ORAL 2 TIMES DAILY WITH MEALS
Qty: 60 TABLET | Refills: 0 | Status: SHIPPED | OUTPATIENT
Start: 2021-01-29 | End: 2021-02-22

## 2021-01-30 ENCOUNTER — IMMUNIZATION (OUTPATIENT)
Dept: INTERNAL MEDICINE | Facility: CLINIC | Age: 76
End: 2021-01-30

## 2021-01-30 DIAGNOSIS — Z23 NEED FOR VACCINATION: Primary | ICD-10-CM

## 2021-01-30 PROCEDURE — 0002A COVID-19, MRNA, LNP-S, PF, 30 MCG/0.3 ML DOSE VACCINE: CPT | Mod: CV19,S$GLB,, | Performed by: FAMILY MEDICINE

## 2021-01-30 PROCEDURE — 0002A COVID-19, MRNA, LNP-S, PF, 30 MCG/0.3 ML DOSE VACCINE: ICD-10-PCS | Mod: CV19,S$GLB,, | Performed by: FAMILY MEDICINE

## 2021-01-30 PROCEDURE — 91300 COVID-19, MRNA, LNP-S, PF, 30 MCG/0.3 ML DOSE VACCINE: CPT | Mod: S$GLB,,, | Performed by: FAMILY MEDICINE

## 2021-01-30 PROCEDURE — 91300 COVID-19, MRNA, LNP-S, PF, 30 MCG/0.3 ML DOSE VACCINE: ICD-10-PCS | Mod: S$GLB,,, | Performed by: FAMILY MEDICINE

## 2021-02-04 ENCOUNTER — PATIENT MESSAGE (OUTPATIENT)
Dept: SPORTS MEDICINE | Facility: CLINIC | Age: 76
End: 2021-02-04

## 2021-02-26 ENCOUNTER — PATIENT MESSAGE (OUTPATIENT)
Dept: SPORTS MEDICINE | Facility: CLINIC | Age: 76
End: 2021-02-26

## 2021-02-26 ENCOUNTER — OFFICE VISIT (OUTPATIENT)
Dept: SPORTS MEDICINE | Facility: CLINIC | Age: 76
End: 2021-02-26
Payer: MEDICARE

## 2021-02-26 VITALS
DIASTOLIC BLOOD PRESSURE: 81 MMHG | HEART RATE: 63 BPM | WEIGHT: 142 LBS | SYSTOLIC BLOOD PRESSURE: 159 MMHG | HEIGHT: 64 IN | BODY MASS INDEX: 24.24 KG/M2

## 2021-02-26 DIAGNOSIS — M79.671 PAIN OF RIGHT HEEL: ICD-10-CM

## 2021-02-26 DIAGNOSIS — M76.61 TENDONITIS, ACHILLES, RIGHT: Primary | ICD-10-CM

## 2021-02-26 DIAGNOSIS — M76.821 TIBIALIS POSTERIOR TENDINITIS, RIGHT: ICD-10-CM

## 2021-02-26 PROCEDURE — 3008F BODY MASS INDEX DOCD: CPT | Mod: CPTII,S$GLB,, | Performed by: NEUROMUSCULOSKELETAL MEDICINE & OMM

## 2021-02-26 PROCEDURE — 1159F MED LIST DOCD IN RCRD: CPT | Mod: S$GLB,,, | Performed by: NEUROMUSCULOSKELETAL MEDICINE & OMM

## 2021-02-26 PROCEDURE — 3079F DIAST BP 80-89 MM HG: CPT | Mod: CPTII,S$GLB,, | Performed by: NEUROMUSCULOSKELETAL MEDICINE & OMM

## 2021-02-26 PROCEDURE — 99999 PR PBB SHADOW E&M-EST. PATIENT-LVL III: ICD-10-PCS | Mod: PBBFAC,,, | Performed by: NEUROMUSCULOSKELETAL MEDICINE & OMM

## 2021-02-26 PROCEDURE — 1159F PR MEDICATION LIST DOCUMENTED IN MEDICAL RECORD: ICD-10-PCS | Mod: S$GLB,,, | Performed by: NEUROMUSCULOSKELETAL MEDICINE & OMM

## 2021-02-26 PROCEDURE — 1125F AMNT PAIN NOTED PAIN PRSNT: CPT | Mod: S$GLB,,, | Performed by: NEUROMUSCULOSKELETAL MEDICINE & OMM

## 2021-02-26 PROCEDURE — 3079F PR MOST RECENT DIASTOLIC BLOOD PRESSURE 80-89 MM HG: ICD-10-PCS | Mod: CPTII,S$GLB,, | Performed by: NEUROMUSCULOSKELETAL MEDICINE & OMM

## 2021-02-26 PROCEDURE — 3008F PR BODY MASS INDEX (BMI) DOCUMENTED: ICD-10-PCS | Mod: CPTII,S$GLB,, | Performed by: NEUROMUSCULOSKELETAL MEDICINE & OMM

## 2021-02-26 PROCEDURE — 1101F PR PT FALLS ASSESS DOC 0-1 FALLS W/OUT INJ PAST YR: ICD-10-PCS | Mod: CPTII,S$GLB,, | Performed by: NEUROMUSCULOSKELETAL MEDICINE & OMM

## 2021-02-26 PROCEDURE — 3288F FALL RISK ASSESSMENT DOCD: CPT | Mod: CPTII,S$GLB,, | Performed by: NEUROMUSCULOSKELETAL MEDICINE & OMM

## 2021-02-26 PROCEDURE — 3077F PR MOST RECENT SYSTOLIC BLOOD PRESSURE >= 140 MM HG: ICD-10-PCS | Mod: CPTII,S$GLB,, | Performed by: NEUROMUSCULOSKELETAL MEDICINE & OMM

## 2021-02-26 PROCEDURE — 1101F PT FALLS ASSESS-DOCD LE1/YR: CPT | Mod: CPTII,S$GLB,, | Performed by: NEUROMUSCULOSKELETAL MEDICINE & OMM

## 2021-02-26 PROCEDURE — 3077F SYST BP >= 140 MM HG: CPT | Mod: CPTII,S$GLB,, | Performed by: NEUROMUSCULOSKELETAL MEDICINE & OMM

## 2021-02-26 PROCEDURE — 3288F PR FALLS RISK ASSESSMENT DOCUMENTED: ICD-10-PCS | Mod: CPTII,S$GLB,, | Performed by: NEUROMUSCULOSKELETAL MEDICINE & OMM

## 2021-02-26 PROCEDURE — 99214 OFFICE O/P EST MOD 30 MIN: CPT | Mod: S$GLB,,, | Performed by: NEUROMUSCULOSKELETAL MEDICINE & OMM

## 2021-02-26 PROCEDURE — 99999 PR PBB SHADOW E&M-EST. PATIENT-LVL III: CPT | Mod: PBBFAC,,, | Performed by: NEUROMUSCULOSKELETAL MEDICINE & OMM

## 2021-02-26 PROCEDURE — 1125F PR PAIN SEVERITY QUANTIFIED, PAIN PRESENT: ICD-10-PCS | Mod: S$GLB,,, | Performed by: NEUROMUSCULOSKELETAL MEDICINE & OMM

## 2021-02-26 PROCEDURE — 99214 PR OFFICE/OUTPT VISIT, EST, LEVL IV, 30-39 MIN: ICD-10-PCS | Mod: S$GLB,,, | Performed by: NEUROMUSCULOSKELETAL MEDICINE & OMM

## 2021-02-26 RX ORDER — NAPROXEN 500 MG/1
500 TABLET ORAL 2 TIMES DAILY WITH MEALS
Qty: 60 TABLET | Refills: 0 | Status: SHIPPED | OUTPATIENT
Start: 2021-02-26 | End: 2021-05-04

## 2021-03-05 ENCOUNTER — PATIENT MESSAGE (OUTPATIENT)
Dept: ADMINISTRATIVE | Facility: HOSPITAL | Age: 76
End: 2021-03-05

## 2021-03-05 ENCOUNTER — PATIENT OUTREACH (OUTPATIENT)
Dept: ADMINISTRATIVE | Facility: HOSPITAL | Age: 76
End: 2021-03-05

## 2021-03-08 ENCOUNTER — CLINICAL SUPPORT (OUTPATIENT)
Dept: REHABILITATION | Facility: HOSPITAL | Age: 76
End: 2021-03-08
Payer: MEDICARE

## 2021-03-08 ENCOUNTER — PATIENT OUTREACH (OUTPATIENT)
Dept: ADMINISTRATIVE | Facility: HOSPITAL | Age: 76
End: 2021-03-08

## 2021-03-08 ENCOUNTER — LAB VISIT (OUTPATIENT)
Dept: LAB | Facility: HOSPITAL | Age: 76
End: 2021-03-08
Attending: INTERNAL MEDICINE
Payer: MEDICARE

## 2021-03-08 DIAGNOSIS — Z12.11 COLON CANCER SCREENING: Primary | ICD-10-CM

## 2021-03-08 DIAGNOSIS — M79.671 PAIN OF RIGHT HEEL: ICD-10-CM

## 2021-03-08 DIAGNOSIS — Z12.11 COLON CANCER SCREENING: ICD-10-CM

## 2021-03-08 DIAGNOSIS — M76.61 TENDONITIS, ACHILLES, RIGHT: ICD-10-CM

## 2021-03-08 PROCEDURE — 97110 THERAPEUTIC EXERCISES: CPT

## 2021-03-08 PROCEDURE — 82274 ASSAY TEST FOR BLOOD FECAL: CPT | Performed by: INTERNAL MEDICINE

## 2021-03-08 PROCEDURE — 97161 PT EVAL LOW COMPLEX 20 MIN: CPT

## 2021-03-11 ENCOUNTER — LAB VISIT (OUTPATIENT)
Dept: LAB | Facility: HOSPITAL | Age: 76
End: 2021-03-11
Attending: INTERNAL MEDICINE
Payer: MEDICARE

## 2021-03-11 DIAGNOSIS — Z12.11 COLON CANCER SCREENING: ICD-10-CM

## 2021-03-11 PROCEDURE — 82274 ASSAY TEST FOR BLOOD FECAL: CPT | Performed by: INTERNAL MEDICINE

## 2021-03-15 LAB — HEMOCCULT STL QL IA: NEGATIVE

## 2021-03-18 ENCOUNTER — PATIENT MESSAGE (OUTPATIENT)
Dept: RESEARCH | Facility: HOSPITAL | Age: 76
End: 2021-03-18

## 2021-03-19 ENCOUNTER — CLINICAL SUPPORT (OUTPATIENT)
Dept: REHABILITATION | Facility: HOSPITAL | Age: 76
End: 2021-03-19
Payer: MEDICARE

## 2021-03-19 DIAGNOSIS — M67.88 ACHILLES TENDONOSIS OF RIGHT LOWER EXTREMITY: ICD-10-CM

## 2021-03-19 DIAGNOSIS — M54.40 CHRONIC LOW BACK PAIN WITH SCIATICA, SCIATICA LATERALITY UNSPECIFIED, UNSPECIFIED BACK PAIN LATERALITY: Primary | ICD-10-CM

## 2021-03-19 DIAGNOSIS — G89.29 CHRONIC LOW BACK PAIN WITH SCIATICA, SCIATICA LATERALITY UNSPECIFIED, UNSPECIFIED BACK PAIN LATERALITY: Primary | ICD-10-CM

## 2021-03-19 PROCEDURE — 97110 THERAPEUTIC EXERCISES: CPT

## 2021-03-22 ENCOUNTER — PATIENT OUTREACH (OUTPATIENT)
Dept: ADMINISTRATIVE | Facility: OTHER | Age: 76
End: 2021-03-22

## 2021-03-22 ENCOUNTER — CLINICAL SUPPORT (OUTPATIENT)
Dept: REHABILITATION | Facility: HOSPITAL | Age: 76
End: 2021-03-22
Payer: MEDICARE

## 2021-03-22 DIAGNOSIS — M67.88 ACHILLES TENDONOSIS OF RIGHT LOWER EXTREMITY: ICD-10-CM

## 2021-03-22 PROCEDURE — 97110 THERAPEUTIC EXERCISES: CPT

## 2021-03-26 ENCOUNTER — PATIENT MESSAGE (OUTPATIENT)
Dept: RESEARCH | Facility: HOSPITAL | Age: 76
End: 2021-03-26

## 2021-03-26 DIAGNOSIS — Z12.11 COLON CANCER SCREENING: Primary | ICD-10-CM

## 2021-03-29 ENCOUNTER — CLINICAL SUPPORT (OUTPATIENT)
Dept: REHABILITATION | Facility: HOSPITAL | Age: 76
End: 2021-03-29
Payer: MEDICARE

## 2021-03-29 DIAGNOSIS — M67.88 ACHILLES TENDONOSIS OF RIGHT LOWER EXTREMITY: ICD-10-CM

## 2021-03-29 LAB — HEMOCCULT STL QL IA: NEGATIVE

## 2021-03-29 PROCEDURE — 97110 THERAPEUTIC EXERCISES: CPT

## 2021-03-29 PROCEDURE — 97112 NEUROMUSCULAR REEDUCATION: CPT

## 2021-04-05 ENCOUNTER — CLINICAL SUPPORT (OUTPATIENT)
Dept: REHABILITATION | Facility: HOSPITAL | Age: 76
End: 2021-04-05
Payer: MEDICARE

## 2021-04-05 DIAGNOSIS — M67.88 ACHILLES TENDONOSIS OF RIGHT LOWER EXTREMITY: ICD-10-CM

## 2021-04-05 PROCEDURE — 97110 THERAPEUTIC EXERCISES: CPT

## 2021-04-05 PROCEDURE — 97140 MANUAL THERAPY 1/> REGIONS: CPT

## 2021-04-05 PROCEDURE — 97112 NEUROMUSCULAR REEDUCATION: CPT

## 2021-04-12 ENCOUNTER — CLINICAL SUPPORT (OUTPATIENT)
Dept: REHABILITATION | Facility: HOSPITAL | Age: 76
End: 2021-04-12
Payer: MEDICARE

## 2021-04-12 DIAGNOSIS — M67.88 ACHILLES TENDONOSIS OF RIGHT LOWER EXTREMITY: ICD-10-CM

## 2021-04-12 PROCEDURE — 97110 THERAPEUTIC EXERCISES: CPT

## 2021-05-04 ENCOUNTER — OFFICE VISIT (OUTPATIENT)
Dept: INTERNAL MEDICINE | Facility: CLINIC | Age: 76
End: 2021-05-04
Payer: MEDICARE

## 2021-05-04 ENCOUNTER — NURSE TRIAGE (OUTPATIENT)
Dept: ADMINISTRATIVE | Facility: CLINIC | Age: 76
End: 2021-05-04

## 2021-05-04 ENCOUNTER — HOSPITAL ENCOUNTER (OUTPATIENT)
Dept: RADIOLOGY | Facility: HOSPITAL | Age: 76
Discharge: HOME | End: 2021-05-04
Attending: PHYSICIAN ASSISTANT
Payer: MEDICARE

## 2021-05-04 ENCOUNTER — OFFICE VISIT (OUTPATIENT)
Dept: ORTHOPEDICS | Facility: CLINIC | Age: 76
End: 2021-05-04
Payer: MEDICARE

## 2021-05-04 ENCOUNTER — HOSPITAL ENCOUNTER (OUTPATIENT)
Dept: RADIOLOGY | Facility: HOSPITAL | Age: 76
Discharge: HOME | End: 2021-05-04
Attending: INTERNAL MEDICINE
Payer: MEDICARE

## 2021-05-04 VITALS
WEIGHT: 141 LBS | BODY MASS INDEX: 24.07 KG/M2 | HEIGHT: 64 IN | SYSTOLIC BLOOD PRESSURE: 125 MMHG | DIASTOLIC BLOOD PRESSURE: 70 MMHG

## 2021-05-04 DIAGNOSIS — S82.832A CLOSED FRACTURE OF DISTAL END OF LEFT FIBULA, UNSPECIFIED FRACTURE MORPHOLOGY, INITIAL ENCOUNTER: Primary | ICD-10-CM

## 2021-05-04 DIAGNOSIS — S82.832A CLOSED FRACTURE OF DISTAL END OF LEFT FIBULA, UNSPECIFIED FRACTURE MORPHOLOGY, INITIAL ENCOUNTER: ICD-10-CM

## 2021-05-04 DIAGNOSIS — M25.572 ACUTE LEFT ANKLE PAIN: ICD-10-CM

## 2021-05-04 PROCEDURE — 73610 X-RAY EXAM OF ANKLE: CPT | Mod: TC,LT

## 2021-05-04 PROCEDURE — 99999 PR PBB SHADOW E&M-EST. PATIENT-LVL III: CPT | Mod: PBBFAC,,, | Performed by: PHYSICIAN ASSISTANT

## 2021-05-04 PROCEDURE — 1159F MED LIST DOCD IN RCRD: CPT | Mod: S$GLB,,, | Performed by: PHYSICIAN ASSISTANT

## 2021-05-04 PROCEDURE — 3288F PR FALLS RISK ASSESSMENT DOCUMENTED: ICD-10-PCS | Mod: CPTII,S$GLB,, | Performed by: INTERNAL MEDICINE

## 2021-05-04 PROCEDURE — 99203 OFFICE O/P NEW LOW 30 MIN: CPT | Mod: S$GLB,,, | Performed by: PHYSICIAN ASSISTANT

## 2021-05-04 PROCEDURE — 99214 PR OFFICE/OUTPT VISIT, EST, LEVL IV, 30-39 MIN: ICD-10-PCS | Mod: S$GLB,,, | Performed by: INTERNAL MEDICINE

## 2021-05-04 PROCEDURE — 1159F MED LIST DOCD IN RCRD: CPT | Mod: S$GLB,,, | Performed by: INTERNAL MEDICINE

## 2021-05-04 PROCEDURE — 73610 X-RAY EXAM OF ANKLE: CPT | Mod: 26,LT,, | Performed by: RADIOLOGY

## 2021-05-04 PROCEDURE — 3008F PR BODY MASS INDEX (BMI) DOCUMENTED: ICD-10-PCS | Mod: CPTII,S$GLB,, | Performed by: INTERNAL MEDICINE

## 2021-05-04 PROCEDURE — 99214 OFFICE O/P EST MOD 30 MIN: CPT | Mod: S$GLB,,, | Performed by: INTERNAL MEDICINE

## 2021-05-04 PROCEDURE — 73610 XR ANKLE COMPLETE 3 VIEW LEFT: ICD-10-PCS | Mod: 26,LT,, | Performed by: RADIOLOGY

## 2021-05-04 PROCEDURE — 3008F BODY MASS INDEX DOCD: CPT | Mod: CPTII,S$GLB,, | Performed by: INTERNAL MEDICINE

## 2021-05-04 PROCEDURE — 99203 PR OFFICE/OUTPT VISIT, NEW, LEVL III, 30-44 MIN: ICD-10-PCS | Mod: S$GLB,,, | Performed by: PHYSICIAN ASSISTANT

## 2021-05-04 PROCEDURE — 99999 PR PBB SHADOW E&M-EST. PATIENT-LVL III: ICD-10-PCS | Mod: PBBFAC,,, | Performed by: INTERNAL MEDICINE

## 2021-05-04 PROCEDURE — 1159F PR MEDICATION LIST DOCUMENTED IN MEDICAL RECORD: ICD-10-PCS | Mod: S$GLB,,, | Performed by: PHYSICIAN ASSISTANT

## 2021-05-04 PROCEDURE — 1100F PTFALLS ASSESS-DOCD GE2>/YR: CPT | Mod: CPTII,S$GLB,, | Performed by: INTERNAL MEDICINE

## 2021-05-04 PROCEDURE — 1159F PR MEDICATION LIST DOCUMENTED IN MEDICAL RECORD: ICD-10-PCS | Mod: S$GLB,,, | Performed by: INTERNAL MEDICINE

## 2021-05-04 PROCEDURE — 3288F FALL RISK ASSESSMENT DOCD: CPT | Mod: CPTII,S$GLB,, | Performed by: INTERNAL MEDICINE

## 2021-05-04 PROCEDURE — 99999 PR PBB SHADOW E&M-EST. PATIENT-LVL III: CPT | Mod: PBBFAC,,, | Performed by: INTERNAL MEDICINE

## 2021-05-04 PROCEDURE — 1125F AMNT PAIN NOTED PAIN PRSNT: CPT | Mod: S$GLB,,, | Performed by: INTERNAL MEDICINE

## 2021-05-04 PROCEDURE — 99999 PR PBB SHADOW E&M-EST. PATIENT-LVL III: ICD-10-PCS | Mod: PBBFAC,,, | Performed by: PHYSICIAN ASSISTANT

## 2021-05-04 PROCEDURE — 1125F PR PAIN SEVERITY QUANTIFIED, PAIN PRESENT: ICD-10-PCS | Mod: S$GLB,,, | Performed by: INTERNAL MEDICINE

## 2021-05-04 PROCEDURE — 1100F PR PT FALLS ASSESS DOC 2+ FALLS/FALL W/INJURY/YR: ICD-10-PCS | Mod: CPTII,S$GLB,, | Performed by: INTERNAL MEDICINE

## 2021-05-04 RX ORDER — TRAMADOL HYDROCHLORIDE 50 MG/1
50 TABLET ORAL EVERY 6 HOURS PRN
Qty: 28 TABLET | Refills: 0 | Status: SHIPPED | OUTPATIENT
Start: 2021-05-04 | End: 2021-05-11

## 2021-05-04 RX ORDER — NAPROXEN 500 MG/1
500 TABLET ORAL 2 TIMES DAILY WITH MEALS
Qty: 60 TABLET | Refills: 0 | Status: SHIPPED | OUTPATIENT
Start: 2021-05-04 | End: 2021-06-03

## 2021-05-05 ENCOUNTER — PATIENT MESSAGE (OUTPATIENT)
Dept: INTERNAL MEDICINE | Facility: CLINIC | Age: 76
End: 2021-05-05

## 2021-05-05 ENCOUNTER — PATIENT MESSAGE (OUTPATIENT)
Dept: ORTHOPEDICS | Facility: CLINIC | Age: 76
End: 2021-05-05

## 2021-05-05 RX ORDER — SIMVASTATIN 20 MG/1
20 TABLET, FILM COATED ORAL NIGHTLY
Qty: 90 TABLET | Refills: 3 | Status: SHIPPED | OUTPATIENT
Start: 2021-05-05 | End: 2022-05-02

## 2021-05-07 ENCOUNTER — PATIENT MESSAGE (OUTPATIENT)
Dept: INTERNAL MEDICINE | Facility: CLINIC | Age: 76
End: 2021-05-07

## 2021-05-08 ENCOUNTER — PATIENT MESSAGE (OUTPATIENT)
Dept: ORTHOPEDICS | Facility: CLINIC | Age: 76
End: 2021-05-08

## 2021-05-08 ENCOUNTER — PATIENT MESSAGE (OUTPATIENT)
Dept: INTERNAL MEDICINE | Facility: CLINIC | Age: 76
End: 2021-05-08

## 2021-05-11 ENCOUNTER — PATIENT MESSAGE (OUTPATIENT)
Dept: ORTHOPEDICS | Facility: CLINIC | Age: 76
End: 2021-05-11

## 2021-05-12 ENCOUNTER — PATIENT MESSAGE (OUTPATIENT)
Dept: ORTHOPEDICS | Facility: CLINIC | Age: 76
End: 2021-05-12

## 2021-05-18 ENCOUNTER — OFFICE VISIT (OUTPATIENT)
Dept: ORTHOPEDICS | Facility: CLINIC | Age: 76
End: 2021-05-18
Payer: MEDICARE

## 2021-05-18 ENCOUNTER — HOSPITAL ENCOUNTER (OUTPATIENT)
Dept: RADIOLOGY | Facility: HOSPITAL | Age: 76
Discharge: HOME | End: 2021-05-18
Attending: PHYSICIAN ASSISTANT
Payer: MEDICARE

## 2021-05-18 VITALS — BODY MASS INDEX: 24.2 KG/M2 | HEIGHT: 64 IN

## 2021-05-18 DIAGNOSIS — S82.832D CLOSED FRACTURE OF DISTAL END OF LEFT FIBULA WITH ROUTINE HEALING, UNSPECIFIED FRACTURE MORPHOLOGY, SUBSEQUENT ENCOUNTER: Primary | ICD-10-CM

## 2021-05-18 DIAGNOSIS — M25.572 LEFT ANKLE PAIN, UNSPECIFIED CHRONICITY: ICD-10-CM

## 2021-05-18 PROCEDURE — 1125F AMNT PAIN NOTED PAIN PRSNT: CPT | Mod: S$GLB,,, | Performed by: PHYSICIAN ASSISTANT

## 2021-05-18 PROCEDURE — 73610 X-RAY EXAM OF ANKLE: CPT | Mod: TC,LT

## 2021-05-18 PROCEDURE — 1125F PR PAIN SEVERITY QUANTIFIED, PAIN PRESENT: ICD-10-PCS | Mod: S$GLB,,, | Performed by: PHYSICIAN ASSISTANT

## 2021-05-18 PROCEDURE — 99213 PR OFFICE/OUTPT VISIT, EST, LEVL III, 20-29 MIN: ICD-10-PCS | Mod: S$GLB,,, | Performed by: PHYSICIAN ASSISTANT

## 2021-05-18 PROCEDURE — 99999 PR PBB SHADOW E&M-EST. PATIENT-LVL III: CPT | Mod: PBBFAC,,, | Performed by: PHYSICIAN ASSISTANT

## 2021-05-18 PROCEDURE — 73610 X-RAY EXAM OF ANKLE: CPT | Mod: 26,LT,, | Performed by: RADIOLOGY

## 2021-05-18 PROCEDURE — 3008F BODY MASS INDEX DOCD: CPT | Mod: CPTII,S$GLB,, | Performed by: PHYSICIAN ASSISTANT

## 2021-05-18 PROCEDURE — 73610 XR ANKLE COMPLETE 3 VIEW LEFT: ICD-10-PCS | Mod: 26,LT,, | Performed by: RADIOLOGY

## 2021-05-18 PROCEDURE — 99999 PR PBB SHADOW E&M-EST. PATIENT-LVL III: ICD-10-PCS | Mod: PBBFAC,,, | Performed by: PHYSICIAN ASSISTANT

## 2021-05-18 PROCEDURE — 1159F MED LIST DOCD IN RCRD: CPT | Mod: S$GLB,,, | Performed by: PHYSICIAN ASSISTANT

## 2021-05-18 PROCEDURE — 3008F PR BODY MASS INDEX (BMI) DOCUMENTED: ICD-10-PCS | Mod: CPTII,S$GLB,, | Performed by: PHYSICIAN ASSISTANT

## 2021-05-18 PROCEDURE — 1159F PR MEDICATION LIST DOCUMENTED IN MEDICAL RECORD: ICD-10-PCS | Mod: S$GLB,,, | Performed by: PHYSICIAN ASSISTANT

## 2021-05-18 PROCEDURE — 99213 OFFICE O/P EST LOW 20 MIN: CPT | Mod: S$GLB,,, | Performed by: PHYSICIAN ASSISTANT

## 2021-06-15 ENCOUNTER — HOSPITAL ENCOUNTER (OUTPATIENT)
Dept: RADIOLOGY | Facility: HOSPITAL | Age: 76
Discharge: HOME | End: 2021-06-15
Attending: PHYSICIAN ASSISTANT
Payer: MEDICARE

## 2021-06-15 ENCOUNTER — OFFICE VISIT (OUTPATIENT)
Dept: ORTHOPEDICS | Facility: CLINIC | Age: 76
End: 2021-06-15
Payer: MEDICARE

## 2021-06-15 DIAGNOSIS — S82.832D CLOSED FRACTURE OF DISTAL END OF LEFT FIBULA WITH ROUTINE HEALING, UNSPECIFIED FRACTURE MORPHOLOGY, SUBSEQUENT ENCOUNTER: Primary | ICD-10-CM

## 2021-06-15 DIAGNOSIS — S82.832D CLOSED FRACTURE OF DISTAL END OF LEFT FIBULA WITH ROUTINE HEALING, UNSPECIFIED FRACTURE MORPHOLOGY, SUBSEQUENT ENCOUNTER: ICD-10-CM

## 2021-06-15 PROCEDURE — 1159F PR MEDICATION LIST DOCUMENTED IN MEDICAL RECORD: ICD-10-PCS | Mod: S$GLB,,, | Performed by: PHYSICIAN ASSISTANT

## 2021-06-15 PROCEDURE — 1159F MED LIST DOCD IN RCRD: CPT | Mod: S$GLB,,, | Performed by: PHYSICIAN ASSISTANT

## 2021-06-15 PROCEDURE — 99999 PR PBB SHADOW E&M-EST. PATIENT-LVL II: CPT | Mod: PBBFAC,,, | Performed by: PHYSICIAN ASSISTANT

## 2021-06-15 PROCEDURE — 73610 X-RAY EXAM OF ANKLE: CPT | Mod: TC,LT

## 2021-06-15 PROCEDURE — 73610 X-RAY EXAM OF ANKLE: CPT | Mod: 26,LT,, | Performed by: RADIOLOGY

## 2021-06-15 PROCEDURE — 99213 OFFICE O/P EST LOW 20 MIN: CPT | Mod: S$GLB,,, | Performed by: PHYSICIAN ASSISTANT

## 2021-06-15 PROCEDURE — 99999 PR PBB SHADOW E&M-EST. PATIENT-LVL II: ICD-10-PCS | Mod: PBBFAC,,, | Performed by: PHYSICIAN ASSISTANT

## 2021-06-15 PROCEDURE — 99213 PR OFFICE/OUTPT VISIT, EST, LEVL III, 20-29 MIN: ICD-10-PCS | Mod: S$GLB,,, | Performed by: PHYSICIAN ASSISTANT

## 2021-06-15 PROCEDURE — 73610 XR ANKLE COMPLETE 3 VIEW LEFT: ICD-10-PCS | Mod: 26,LT,, | Performed by: RADIOLOGY

## 2021-07-06 ENCOUNTER — PATIENT OUTREACH (OUTPATIENT)
Dept: ADMINISTRATIVE | Facility: OTHER | Age: 76
End: 2021-07-06

## 2021-07-06 ENCOUNTER — PATIENT MESSAGE (OUTPATIENT)
Dept: ORTHOPEDICS | Facility: CLINIC | Age: 76
End: 2021-07-06

## 2021-07-06 ENCOUNTER — HOSPITAL ENCOUNTER (OUTPATIENT)
Dept: RADIOLOGY | Facility: HOSPITAL | Age: 76
Discharge: HOME | End: 2021-07-06
Attending: PHYSICIAN ASSISTANT
Payer: MEDICARE

## 2021-07-06 ENCOUNTER — OFFICE VISIT (OUTPATIENT)
Dept: ORTHOPEDICS | Facility: CLINIC | Age: 76
End: 2021-07-06
Payer: MEDICARE

## 2021-07-06 VITALS — WEIGHT: 141.13 LBS | HEIGHT: 64 IN | BODY MASS INDEX: 24.1 KG/M2

## 2021-07-06 DIAGNOSIS — S82.832D CLOSED FRACTURE OF DISTAL END OF LEFT FIBULA WITH ROUTINE HEALING, UNSPECIFIED FRACTURE MORPHOLOGY, SUBSEQUENT ENCOUNTER: Primary | ICD-10-CM

## 2021-07-06 DIAGNOSIS — Z12.31 ENCOUNTER FOR SCREENING MAMMOGRAM FOR MALIGNANT NEOPLASM OF BREAST: Primary | ICD-10-CM

## 2021-07-06 DIAGNOSIS — M25.572 LEFT ANKLE PAIN, UNSPECIFIED CHRONICITY: ICD-10-CM

## 2021-07-06 PROCEDURE — 1125F PR PAIN SEVERITY QUANTIFIED, PAIN PRESENT: ICD-10-PCS | Mod: S$GLB,,, | Performed by: PHYSICIAN ASSISTANT

## 2021-07-06 PROCEDURE — 1159F PR MEDICATION LIST DOCUMENTED IN MEDICAL RECORD: ICD-10-PCS | Mod: S$GLB,,, | Performed by: PHYSICIAN ASSISTANT

## 2021-07-06 PROCEDURE — 73610 X-RAY EXAM OF ANKLE: CPT | Mod: 26,LT,, | Performed by: RADIOLOGY

## 2021-07-06 PROCEDURE — 73610 X-RAY EXAM OF ANKLE: CPT | Mod: TC,LT

## 2021-07-06 PROCEDURE — 1159F MED LIST DOCD IN RCRD: CPT | Mod: S$GLB,,, | Performed by: PHYSICIAN ASSISTANT

## 2021-07-06 PROCEDURE — 3008F PR BODY MASS INDEX (BMI) DOCUMENTED: ICD-10-PCS | Mod: CPTII,S$GLB,, | Performed by: PHYSICIAN ASSISTANT

## 2021-07-06 PROCEDURE — 1125F AMNT PAIN NOTED PAIN PRSNT: CPT | Mod: S$GLB,,, | Performed by: PHYSICIAN ASSISTANT

## 2021-07-06 PROCEDURE — 99213 OFFICE O/P EST LOW 20 MIN: CPT | Mod: S$GLB,,, | Performed by: PHYSICIAN ASSISTANT

## 2021-07-06 PROCEDURE — 99999 PR PBB SHADOW E&M-EST. PATIENT-LVL III: ICD-10-PCS | Mod: PBBFAC,,, | Performed by: PHYSICIAN ASSISTANT

## 2021-07-06 PROCEDURE — 99213 PR OFFICE/OUTPT VISIT, EST, LEVL III, 20-29 MIN: ICD-10-PCS | Mod: S$GLB,,, | Performed by: PHYSICIAN ASSISTANT

## 2021-07-06 PROCEDURE — 99999 PR PBB SHADOW E&M-EST. PATIENT-LVL III: CPT | Mod: PBBFAC,,, | Performed by: PHYSICIAN ASSISTANT

## 2021-07-06 PROCEDURE — 3008F BODY MASS INDEX DOCD: CPT | Mod: CPTII,S$GLB,, | Performed by: PHYSICIAN ASSISTANT

## 2021-07-06 PROCEDURE — 73610 XR ANKLE COMPLETE 3 VIEW LEFT: ICD-10-PCS | Mod: 26,LT,, | Performed by: RADIOLOGY

## 2021-07-19 ENCOUNTER — PATIENT MESSAGE (OUTPATIENT)
Dept: ORTHOPEDICS | Facility: CLINIC | Age: 76
End: 2021-07-19

## 2021-08-06 ENCOUNTER — HOSPITAL ENCOUNTER (OUTPATIENT)
Dept: RADIOLOGY | Facility: OTHER | Age: 76
Discharge: HOME | End: 2021-08-06
Attending: INTERNAL MEDICINE
Payer: MEDICARE

## 2021-08-06 DIAGNOSIS — Z12.31 ENCOUNTER FOR SCREENING MAMMOGRAM FOR MALIGNANT NEOPLASM OF BREAST: ICD-10-CM

## 2021-08-06 PROCEDURE — 77063 MAMMO DIGITAL SCREENING BILAT WITH TOMO: ICD-10-PCS | Mod: 26,,, | Performed by: RADIOLOGY

## 2021-08-06 PROCEDURE — 77067 SCR MAMMO BI INCL CAD: CPT | Mod: 26,,, | Performed by: RADIOLOGY

## 2021-08-06 PROCEDURE — 77067 MAMMO DIGITAL SCREENING BILAT WITH TOMO: ICD-10-PCS | Mod: 26,,, | Performed by: RADIOLOGY

## 2021-08-06 PROCEDURE — 77063 BREAST TOMOSYNTHESIS BI: CPT | Mod: 26,,, | Performed by: RADIOLOGY

## 2021-08-06 PROCEDURE — 77067 SCR MAMMO BI INCL CAD: CPT | Mod: TC

## 2021-09-18 ENCOUNTER — PATIENT MESSAGE (OUTPATIENT)
Dept: INTERNAL MEDICINE | Facility: CLINIC | Age: 76
End: 2021-09-18

## 2021-09-20 ENCOUNTER — PATIENT MESSAGE (OUTPATIENT)
Dept: INTERNAL MEDICINE | Facility: CLINIC | Age: 76
End: 2021-09-20

## 2021-10-04 ENCOUNTER — IMMUNIZATION (OUTPATIENT)
Dept: INTERNAL MEDICINE | Facility: CLINIC | Age: 76
End: 2021-10-04
Payer: MEDICARE

## 2021-10-04 DIAGNOSIS — Z23 NEED FOR VACCINATION: Primary | ICD-10-CM

## 2021-10-04 PROCEDURE — 91300 COVID-19, MRNA, LNP-S, PF, 30 MCG/0.3 ML DOSE VACCINE: CPT | Mod: PBBFAC | Performed by: INTERNAL MEDICINE

## 2021-10-04 PROCEDURE — 0003A COVID-19, MRNA, LNP-S, PF, 30 MCG/0.3 ML DOSE VACCINE: CPT | Mod: PBBFAC | Performed by: INTERNAL MEDICINE

## 2021-10-21 ENCOUNTER — PES CALL (OUTPATIENT)
Dept: ADMINISTRATIVE | Facility: CLINIC | Age: 76
End: 2021-10-21

## 2021-12-10 ENCOUNTER — PES CALL (OUTPATIENT)
Dept: ADMINISTRATIVE | Facility: CLINIC | Age: 76
End: 2021-12-10
Payer: MEDICARE

## 2021-12-30 ENCOUNTER — PATIENT MESSAGE (OUTPATIENT)
Dept: INTERNAL MEDICINE | Facility: CLINIC | Age: 76
End: 2021-12-30
Payer: MEDICARE

## 2021-12-30 RX ORDER — LOSARTAN POTASSIUM 25 MG/1
25 TABLET ORAL DAILY
Qty: 90 TABLET | Refills: 0 | Status: SHIPPED | OUTPATIENT
Start: 2021-12-30 | End: 2022-02-07

## 2022-01-11 ENCOUNTER — PES CALL (OUTPATIENT)
Dept: ADMINISTRATIVE | Facility: CLINIC | Age: 77
End: 2022-01-11
Payer: MEDICARE

## 2022-01-13 ENCOUNTER — PATIENT MESSAGE (OUTPATIENT)
Dept: INTERNAL MEDICINE | Facility: CLINIC | Age: 77
End: 2022-01-13
Payer: MEDICARE

## 2022-01-13 DIAGNOSIS — E78.2 MIXED HYPERLIPIDEMIA: Primary | ICD-10-CM

## 2022-01-13 DIAGNOSIS — I10 ESSENTIAL HYPERTENSION, BENIGN: ICD-10-CM

## 2022-01-14 ENCOUNTER — LAB VISIT (OUTPATIENT)
Dept: LAB | Facility: HOSPITAL | Age: 77
End: 2022-01-14
Attending: INTERNAL MEDICINE
Payer: MEDICARE

## 2022-01-14 ENCOUNTER — PATIENT MESSAGE (OUTPATIENT)
Dept: INTERNAL MEDICINE | Facility: CLINIC | Age: 77
End: 2022-01-14
Payer: MEDICARE

## 2022-01-14 DIAGNOSIS — R30.0 DYSURIA: ICD-10-CM

## 2022-01-14 DIAGNOSIS — R30.0 DYSURIA: Primary | ICD-10-CM

## 2022-01-14 LAB
BILIRUB UR QL STRIP: NEGATIVE
CLARITY UR REFRACT.AUTO: CLEAR
COLOR UR AUTO: NORMAL
GLUCOSE UR QL STRIP: NEGATIVE
HGB UR QL STRIP: NEGATIVE
KETONES UR QL STRIP: NEGATIVE
LEUKOCYTE ESTERASE UR QL STRIP: NEGATIVE
NITRITE UR QL STRIP: NEGATIVE
PH UR STRIP: 6 [PH] (ref 5–8)
PROT UR QL STRIP: NEGATIVE
SP GR UR STRIP: 1.01 (ref 1–1.03)
URN SPEC COLLECT METH UR: NORMAL

## 2022-01-14 PROCEDURE — 81003 URINALYSIS AUTO W/O SCOPE: CPT | Performed by: INTERNAL MEDICINE

## 2022-01-14 RX ORDER — NITROFURANTOIN 25; 75 MG/1; MG/1
100 CAPSULE ORAL 2 TIMES DAILY
Qty: 14 CAPSULE | Refills: 0 | Status: SHIPPED | OUTPATIENT
Start: 2022-01-14 | End: 2022-02-02

## 2022-01-26 ENCOUNTER — LAB VISIT (OUTPATIENT)
Dept: LAB | Facility: OTHER | Age: 77
End: 2022-01-26
Attending: INTERNAL MEDICINE
Payer: MEDICARE

## 2022-01-26 DIAGNOSIS — I10 ESSENTIAL HYPERTENSION, BENIGN: ICD-10-CM

## 2022-01-26 DIAGNOSIS — E78.2 MIXED HYPERLIPIDEMIA: ICD-10-CM

## 2022-01-26 LAB
ALBUMIN SERPL BCP-MCNC: 4.3 G/DL (ref 3.5–5.2)
ALP SERPL-CCNC: 62 U/L (ref 55–135)
ALT SERPL W/O P-5'-P-CCNC: 18 U/L (ref 10–44)
ANION GAP SERPL CALC-SCNC: 9 MMOL/L (ref 8–16)
AST SERPL-CCNC: 18 U/L (ref 10–40)
BASOPHILS # BLD AUTO: 0.04 K/UL (ref 0–0.2)
BASOPHILS NFR BLD: 0.8 % (ref 0–1.9)
BILIRUB SERPL-MCNC: 0.5 MG/DL (ref 0.1–1)
BUN SERPL-MCNC: 12 MG/DL (ref 8–23)
CALCIUM SERPL-MCNC: 9.7 MG/DL (ref 8.7–10.5)
CHLORIDE SERPL-SCNC: 107 MMOL/L (ref 95–110)
CHOLEST SERPL-MCNC: 154 MG/DL (ref 120–199)
CHOLEST/HDLC SERPL: 2.5 {RATIO} (ref 2–5)
CO2 SERPL-SCNC: 27 MMOL/L (ref 23–29)
CREAT SERPL-MCNC: 0.8 MG/DL (ref 0.5–1.4)
DIFFERENTIAL METHOD: ABNORMAL
EOSINOPHIL # BLD AUTO: 0.2 K/UL (ref 0–0.5)
EOSINOPHIL NFR BLD: 4.3 % (ref 0–8)
ERYTHROCYTE [DISTWIDTH] IN BLOOD BY AUTOMATED COUNT: 12.4 % (ref 11.5–14.5)
EST. GFR  (AFRICAN AMERICAN): >60 ML/MIN/1.73 M^2
EST. GFR  (NON AFRICAN AMERICAN): >60 ML/MIN/1.73 M^2
GLUCOSE SERPL-MCNC: 88 MG/DL (ref 70–110)
HCT VFR BLD AUTO: 42.2 % (ref 37–48.5)
HDLC SERPL-MCNC: 61 MG/DL (ref 40–75)
HDLC SERPL: 39.6 % (ref 20–50)
HGB BLD-MCNC: 14 G/DL (ref 12–16)
IMM GRANULOCYTES # BLD AUTO: 0.02 K/UL (ref 0–0.04)
IMM GRANULOCYTES NFR BLD AUTO: 0.4 % (ref 0–0.5)
LDLC SERPL CALC-MCNC: 75.6 MG/DL (ref 63–159)
LYMPHOCYTES # BLD AUTO: 1.7 K/UL (ref 1–4.8)
LYMPHOCYTES NFR BLD: 33.5 % (ref 18–48)
MCH RBC QN AUTO: 31.9 PG (ref 27–31)
MCHC RBC AUTO-ENTMCNC: 33.2 G/DL (ref 32–36)
MCV RBC AUTO: 96 FL (ref 82–98)
MONOCYTES # BLD AUTO: 0.4 K/UL (ref 0.3–1)
MONOCYTES NFR BLD: 8.2 % (ref 4–15)
NEUTROPHILS # BLD AUTO: 2.7 K/UL (ref 1.8–7.7)
NEUTROPHILS NFR BLD: 52.8 % (ref 38–73)
NONHDLC SERPL-MCNC: 93 MG/DL
NRBC BLD-RTO: 0 /100 WBC
PLATELET # BLD AUTO: 172 K/UL (ref 150–450)
PLATELET BLD QL SMEAR: ABNORMAL
PMV BLD AUTO: 10.9 FL (ref 9.2–12.9)
POTASSIUM SERPL-SCNC: 4.8 MMOL/L (ref 3.5–5.1)
PROT SERPL-MCNC: 7 G/DL (ref 6–8.4)
RBC # BLD AUTO: 4.39 M/UL (ref 4–5.4)
SODIUM SERPL-SCNC: 143 MMOL/L (ref 136–145)
TRIGL SERPL-MCNC: 87 MG/DL (ref 30–150)
WBC # BLD AUTO: 5.13 K/UL (ref 3.9–12.7)

## 2022-01-26 PROCEDURE — 80061 LIPID PANEL: CPT | Performed by: INTERNAL MEDICINE

## 2022-01-26 PROCEDURE — 85025 COMPLETE CBC W/AUTO DIFF WBC: CPT | Performed by: INTERNAL MEDICINE

## 2022-01-26 PROCEDURE — 36415 COLL VENOUS BLD VENIPUNCTURE: CPT | Performed by: INTERNAL MEDICINE

## 2022-01-26 PROCEDURE — 80053 COMPREHEN METABOLIC PANEL: CPT | Performed by: INTERNAL MEDICINE

## 2022-02-01 PROBLEM — M85.89 OSTEOPENIA OF MULTIPLE SITES: Status: ACTIVE | Noted: 2022-02-01

## 2022-02-01 NOTE — PROGRESS NOTES
Subjective:       Patient ID: Torie Richard is a 76 y.o. female.    Chief Complaint: Annual Exam    Pt here for f/u. She again declines c-scope and other screening modalities for colon ca but is willing to do cologuard. Understands r/b.    HLD has been well controlled and tolerating zocor well.     Still using otc sleep aids with relief. Proper use again d/w pt and she understands.    Pt's BP is normally well controlled but high today. Tolerating meds well but didn't take this AM. Pt denies cp/sob/ha/vision or neuro changes. Checking at home and is well controlled.     She has mild osteopenia on DEXA 5/2020. Reiterated importance of ca/d.      Hyperlipidemia  Pertinent negatives include no chest pain or shortness of breath.     Review of Systems   Constitutional: Negative for activity change and unexpected weight change.   HENT: Negative for hearing loss, rhinorrhea and trouble swallowing.    Eyes: Negative for discharge and visual disturbance.   Respiratory: Negative for chest tightness, shortness of breath and wheezing.    Cardiovascular: Negative for chest pain and palpitations.   Gastrointestinal: Negative for abdominal pain, blood in stool, constipation, diarrhea and vomiting.   Endocrine: Negative for polydipsia and polyuria.   Genitourinary: Negative for difficulty urinating, dysuria, hematuria and menstrual problem.   Musculoskeletal: Negative for arthralgias, joint swelling and neck pain.   Neurological: Negative for weakness and headaches.   Psychiatric/Behavioral: Negative for confusion and dysphoric mood.       Objective:      Physical Exam  Constitutional:       Appearance: She is well-developed.   HENT:      Head: Normocephalic and atraumatic.   Neck:      Thyroid: No thyromegaly.   Cardiovascular:      Rate and Rhythm: Normal rate and regular rhythm.      Heart sounds: Normal heart sounds.   Pulmonary:      Effort: Pulmonary effort is normal.      Breath sounds: Normal breath sounds.   Abdominal:       General: Bowel sounds are normal. There is no distension.      Palpations: Abdomen is soft. There is no mass.      Tenderness: There is no abdominal tenderness.   Musculoskeletal:      Cervical back: Neck supple.   Lymphadenopathy:      Cervical: No cervical adenopathy.   Neurological:      Mental Status: She is alert and oriented to person, place, and time.      Cranial Nerves: No cranial nerve deficit.   Psychiatric:         Behavior: Behavior normal.         Assessment:       1. Essential hypertension, benign    2. Mixed hyperlipidemia    3. Chronic low back pain with sciatica, sciatica laterality unspecified, unspecified back pain laterality    4. Osteopenia of multiple sites    5. Encounter for colorectal cancer screening        Plan:       1. Recent labs reviewed    2. Home BP monitoring; pt wishes to monitor at home for 1 week but if still high then, will increase losartan to 50mg daily  3. Cologuard

## 2022-02-02 ENCOUNTER — OFFICE VISIT (OUTPATIENT)
Dept: INTERNAL MEDICINE | Facility: CLINIC | Age: 77
End: 2022-02-02
Payer: MEDICARE

## 2022-02-02 VITALS
BODY MASS INDEX: 24.3 KG/M2 | WEIGHT: 142.31 LBS | HEIGHT: 64 IN | SYSTOLIC BLOOD PRESSURE: 150 MMHG | OXYGEN SATURATION: 98 % | DIASTOLIC BLOOD PRESSURE: 84 MMHG | HEART RATE: 64 BPM

## 2022-02-02 DIAGNOSIS — G89.29 CHRONIC LOW BACK PAIN WITH SCIATICA, SCIATICA LATERALITY UNSPECIFIED, UNSPECIFIED BACK PAIN LATERALITY: ICD-10-CM

## 2022-02-02 DIAGNOSIS — I10 ESSENTIAL HYPERTENSION, BENIGN: Primary | ICD-10-CM

## 2022-02-02 DIAGNOSIS — Z12.11 ENCOUNTER FOR COLORECTAL CANCER SCREENING: ICD-10-CM

## 2022-02-02 DIAGNOSIS — M85.89 OSTEOPENIA OF MULTIPLE SITES: ICD-10-CM

## 2022-02-02 DIAGNOSIS — M54.40 CHRONIC LOW BACK PAIN WITH SCIATICA, SCIATICA LATERALITY UNSPECIFIED, UNSPECIFIED BACK PAIN LATERALITY: ICD-10-CM

## 2022-02-02 DIAGNOSIS — E78.2 MIXED HYPERLIPIDEMIA: ICD-10-CM

## 2022-02-02 DIAGNOSIS — Z12.12 ENCOUNTER FOR COLORECTAL CANCER SCREENING: ICD-10-CM

## 2022-02-02 PROCEDURE — 3077F SYST BP >= 140 MM HG: CPT | Mod: CPTII,S$GLB,, | Performed by: INTERNAL MEDICINE

## 2022-02-02 PROCEDURE — 99499 RISK ADDL DX/OHS AUDIT: ICD-10-PCS | Mod: S$GLB,,, | Performed by: INTERNAL MEDICINE

## 2022-02-02 PROCEDURE — 1160F RVW MEDS BY RX/DR IN RCRD: CPT | Mod: CPTII,S$GLB,, | Performed by: INTERNAL MEDICINE

## 2022-02-02 PROCEDURE — 1125F AMNT PAIN NOTED PAIN PRSNT: CPT | Mod: CPTII,S$GLB,, | Performed by: INTERNAL MEDICINE

## 2022-02-02 PROCEDURE — 1100F PTFALLS ASSESS-DOCD GE2>/YR: CPT | Mod: CPTII,S$GLB,, | Performed by: INTERNAL MEDICINE

## 2022-02-02 PROCEDURE — 3288F FALL RISK ASSESSMENT DOCD: CPT | Mod: CPTII,S$GLB,, | Performed by: INTERNAL MEDICINE

## 2022-02-02 PROCEDURE — 99999 PR PBB SHADOW E&M-EST. PATIENT-LVL III: CPT | Mod: PBBFAC,,, | Performed by: INTERNAL MEDICINE

## 2022-02-02 PROCEDURE — 3079F DIAST BP 80-89 MM HG: CPT | Mod: CPTII,S$GLB,, | Performed by: INTERNAL MEDICINE

## 2022-02-02 PROCEDURE — 1160F PR REVIEW ALL MEDS BY PRESCRIBER/CLIN PHARMACIST DOCUMENTED: ICD-10-PCS | Mod: CPTII,S$GLB,, | Performed by: INTERNAL MEDICINE

## 2022-02-02 PROCEDURE — 99499 UNLISTED E&M SERVICE: CPT | Mod: S$GLB,,, | Performed by: INTERNAL MEDICINE

## 2022-02-02 PROCEDURE — 3077F PR MOST RECENT SYSTOLIC BLOOD PRESSURE >= 140 MM HG: ICD-10-PCS | Mod: CPTII,S$GLB,, | Performed by: INTERNAL MEDICINE

## 2022-02-02 PROCEDURE — 1159F MED LIST DOCD IN RCRD: CPT | Mod: CPTII,S$GLB,, | Performed by: INTERNAL MEDICINE

## 2022-02-02 PROCEDURE — 99214 PR OFFICE/OUTPT VISIT, EST, LEVL IV, 30-39 MIN: ICD-10-PCS | Mod: S$GLB,,, | Performed by: INTERNAL MEDICINE

## 2022-02-02 PROCEDURE — 99999 PR PBB SHADOW E&M-EST. PATIENT-LVL III: ICD-10-PCS | Mod: PBBFAC,,, | Performed by: INTERNAL MEDICINE

## 2022-02-02 PROCEDURE — 1159F PR MEDICATION LIST DOCUMENTED IN MEDICAL RECORD: ICD-10-PCS | Mod: CPTII,S$GLB,, | Performed by: INTERNAL MEDICINE

## 2022-02-02 PROCEDURE — 3008F BODY MASS INDEX DOCD: CPT | Mod: CPTII,S$GLB,, | Performed by: INTERNAL MEDICINE

## 2022-02-02 PROCEDURE — 1125F PR PAIN SEVERITY QUANTIFIED, PAIN PRESENT: ICD-10-PCS | Mod: CPTII,S$GLB,, | Performed by: INTERNAL MEDICINE

## 2022-02-02 PROCEDURE — 1100F PR PT FALLS ASSESS DOC 2+ FALLS/FALL W/INJURY/YR: ICD-10-PCS | Mod: CPTII,S$GLB,, | Performed by: INTERNAL MEDICINE

## 2022-02-02 PROCEDURE — 99214 OFFICE O/P EST MOD 30 MIN: CPT | Mod: S$GLB,,, | Performed by: INTERNAL MEDICINE

## 2022-02-02 PROCEDURE — 3079F PR MOST RECENT DIASTOLIC BLOOD PRESSURE 80-89 MM HG: ICD-10-PCS | Mod: CPTII,S$GLB,, | Performed by: INTERNAL MEDICINE

## 2022-02-02 PROCEDURE — 3008F PR BODY MASS INDEX (BMI) DOCUMENTED: ICD-10-PCS | Mod: CPTII,S$GLB,, | Performed by: INTERNAL MEDICINE

## 2022-02-02 PROCEDURE — 3288F PR FALLS RISK ASSESSMENT DOCUMENTED: ICD-10-PCS | Mod: CPTII,S$GLB,, | Performed by: INTERNAL MEDICINE

## 2022-02-02 RX ORDER — MINERAL OIL
180 ENEMA (ML) RECTAL DAILY
COMMUNITY
End: 2023-01-12

## 2022-02-07 ENCOUNTER — PATIENT MESSAGE (OUTPATIENT)
Dept: INTERNAL MEDICINE | Facility: CLINIC | Age: 77
End: 2022-02-07
Payer: MEDICARE

## 2022-02-07 DIAGNOSIS — I10 ESSENTIAL HYPERTENSION, BENIGN: Primary | ICD-10-CM

## 2022-02-07 RX ORDER — LOSARTAN POTASSIUM 50 MG/1
50 TABLET ORAL DAILY
Qty: 90 TABLET | Refills: 3 | Status: SHIPPED | OUTPATIENT
Start: 2022-02-07 | End: 2022-02-10

## 2022-02-08 NOTE — TELEPHONE ENCOUNTER
Messaged pt and selected option to be notified if not read by tomorrow    I set two wk bp check reminder

## 2022-02-09 ENCOUNTER — TELEPHONE (OUTPATIENT)
Dept: INTERNAL MEDICINE | Facility: CLINIC | Age: 77
End: 2022-02-09
Payer: MEDICARE

## 2022-02-09 ENCOUNTER — PATIENT MESSAGE (OUTPATIENT)
Dept: INTERNAL MEDICINE | Facility: CLINIC | Age: 77
End: 2022-02-09
Payer: MEDICARE

## 2022-02-09 DIAGNOSIS — I10 ESSENTIAL HYPERTENSION, BENIGN: Primary | ICD-10-CM

## 2022-02-10 RX ORDER — LOSARTAN POTASSIUM 100 MG/1
100 TABLET ORAL DAILY
Qty: 90 TABLET | Refills: 3 | Status: SHIPPED | OUTPATIENT
Start: 2022-02-10 | End: 2022-12-30

## 2022-02-11 ENCOUNTER — PATIENT MESSAGE (OUTPATIENT)
Dept: INTERNAL MEDICINE | Facility: CLINIC | Age: 77
End: 2022-02-11
Payer: MEDICARE

## 2022-02-14 ENCOUNTER — TELEPHONE (OUTPATIENT)
Dept: INTERNAL MEDICINE | Facility: CLINIC | Age: 77
End: 2022-02-14
Payer: MEDICARE

## 2022-02-14 NOTE — TELEPHONE ENCOUNTER
----- Message from Chloé Nielsen MA sent at 2/14/2022  2:10 PM CST -----  Regarding: pt requesting a call back  Name of Who is Calling: FITZ GILLILAND [[id number]]           What is the request in detail: pt requesting a call back n eeds to speak with someone in office about getting a medical clearance form filled for for surgery that scheduled at the end of the march            Can the clinic reply by [facility]: N           What Number to Call Back if not in [facility]: [phone]

## 2022-02-14 NOTE — TELEPHONE ENCOUNTER
Ms. Richard states that she will be having Cataract Surgery at the end of March and would like to know if she can bring her paperwork for clearance to be filled out or do she need to schedule another appt.  Patient was last seen on 02/02/2022

## 2022-02-15 ENCOUNTER — PATIENT MESSAGE (OUTPATIENT)
Dept: INTERNAL MEDICINE | Facility: CLINIC | Age: 77
End: 2022-02-15
Payer: MEDICARE

## 2022-02-15 NOTE — TELEPHONE ENCOUNTER
Called pt and let her know as per Dr. Burciaga that she does not need another appt  Pt states she will drop them off  Pt verbalized understanding and had no further concerns or questions.

## 2022-02-18 LAB — NONINV COLON CA DNA+OCC BLD SCRN STL QL: NEGATIVE

## 2022-02-22 ENCOUNTER — TELEPHONE (OUTPATIENT)
Dept: INTERNAL MEDICINE | Facility: CLINIC | Age: 77
End: 2022-02-22
Payer: MEDICARE

## 2022-02-22 NOTE — TELEPHONE ENCOUNTER
Spoke to pt who gave me the following BP readings:  109/70 p83  118/75 p71  121/76 p79  134/67 p74    Added 118/75 to remote BP  I let her know:  This looks good! Please continue to monitor at home, and let us know if you're consistently running at or above 140 on top, or 90 on the bottom.   Pt verbalized understanding and had no further concerns or questions.

## 2022-02-22 NOTE — TELEPHONE ENCOUNTER
----- Message from Bree Retana MA sent at 2/8/2022  8:55 AM CST -----  Regarding: bp  Two wks since med change

## 2022-02-24 ENCOUNTER — LAB VISIT (OUTPATIENT)
Dept: LAB | Facility: OTHER | Age: 77
End: 2022-02-24
Attending: INTERNAL MEDICINE
Payer: MEDICARE

## 2022-02-24 ENCOUNTER — TELEPHONE (OUTPATIENT)
Dept: INTERNAL MEDICINE | Facility: CLINIC | Age: 77
End: 2022-02-24
Payer: MEDICARE

## 2022-02-24 DIAGNOSIS — I10 ESSENTIAL HYPERTENSION, BENIGN: ICD-10-CM

## 2022-02-24 LAB
ANION GAP SERPL CALC-SCNC: 8 MMOL/L (ref 8–16)
BUN SERPL-MCNC: 14 MG/DL (ref 8–23)
CALCIUM SERPL-MCNC: 9.9 MG/DL (ref 8.7–10.5)
CHLORIDE SERPL-SCNC: 107 MMOL/L (ref 95–110)
CO2 SERPL-SCNC: 27 MMOL/L (ref 23–29)
CREAT SERPL-MCNC: 0.8 MG/DL (ref 0.5–1.4)
EST. GFR  (AFRICAN AMERICAN): >60 ML/MIN/1.73 M^2
EST. GFR  (NON AFRICAN AMERICAN): >60 ML/MIN/1.73 M^2
GLUCOSE SERPL-MCNC: 92 MG/DL (ref 70–110)
POTASSIUM SERPL-SCNC: 3.9 MMOL/L (ref 3.5–5.1)
SODIUM SERPL-SCNC: 142 MMOL/L (ref 136–145)

## 2022-02-24 PROCEDURE — 36415 COLL VENOUS BLD VENIPUNCTURE: CPT | Performed by: INTERNAL MEDICINE

## 2022-02-24 PROCEDURE — 80048 BASIC METABOLIC PNL TOTAL CA: CPT | Performed by: INTERNAL MEDICINE

## 2022-02-24 NOTE — TELEPHONE ENCOUNTER
Pt dropped off pre op forms for us to fill out based on LOV  Pt is having surgery with [facility]    Dr. Burciaga and I filled out forms.   Called pt to ask if she wanted to pick them back up  She states we can just fax them, but not until 30 days of surgery.  I set a reminder for myself to fax forms March 3 and left papers in my drawer  Will send to be scanned to chart after faxing

## 2022-03-18 ENCOUNTER — PATIENT MESSAGE (OUTPATIENT)
Dept: ADMINISTRATIVE | Facility: HOSPITAL | Age: 77
End: 2022-03-18
Payer: MEDICARE

## 2022-04-05 ENCOUNTER — PES CALL (OUTPATIENT)
Dept: ADMINISTRATIVE | Facility: CLINIC | Age: 77
End: 2022-04-05
Payer: MEDICARE

## 2022-04-13 DIAGNOSIS — Z12.11 COLON CANCER SCREENING: ICD-10-CM

## 2022-04-19 ENCOUNTER — OFFICE VISIT (OUTPATIENT)
Dept: INTERNAL MEDICINE | Facility: CLINIC | Age: 77
End: 2022-04-19
Payer: MEDICARE

## 2022-04-19 VITALS
DIASTOLIC BLOOD PRESSURE: 72 MMHG | SYSTOLIC BLOOD PRESSURE: 138 MMHG | HEIGHT: 64 IN | BODY MASS INDEX: 24.32 KG/M2 | OXYGEN SATURATION: 100 % | HEART RATE: 75 BPM | WEIGHT: 142.44 LBS

## 2022-04-19 DIAGNOSIS — K80.50 CALCULUS OF BILE DUCT WITHOUT CHOLECYSTITIS AND WITHOUT OBSTRUCTION: ICD-10-CM

## 2022-04-19 DIAGNOSIS — M54.40 CHRONIC LOW BACK PAIN WITH SCIATICA, SCIATICA LATERALITY UNSPECIFIED, UNSPECIFIED BACK PAIN LATERALITY: ICD-10-CM

## 2022-04-19 DIAGNOSIS — L71.9 ROSACEA: ICD-10-CM

## 2022-04-19 DIAGNOSIS — M85.89 OSTEOPENIA OF MULTIPLE SITES: ICD-10-CM

## 2022-04-19 DIAGNOSIS — G47.00 INSOMNIA, UNSPECIFIED TYPE: ICD-10-CM

## 2022-04-19 DIAGNOSIS — G89.29 CHRONIC LOW BACK PAIN WITH SCIATICA, SCIATICA LATERALITY UNSPECIFIED, UNSPECIFIED BACK PAIN LATERALITY: ICD-10-CM

## 2022-04-19 DIAGNOSIS — Z00.00 ENCOUNTER FOR PREVENTIVE HEALTH EXAMINATION: Primary | ICD-10-CM

## 2022-04-19 DIAGNOSIS — E78.2 MIXED HYPERLIPIDEMIA: Chronic | ICD-10-CM

## 2022-04-19 DIAGNOSIS — I10 ESSENTIAL HYPERTENSION, BENIGN: ICD-10-CM

## 2022-04-19 DIAGNOSIS — M67.88 ACHILLES TENDONOSIS OF RIGHT LOWER EXTREMITY: ICD-10-CM

## 2022-04-19 PROCEDURE — 3008F BODY MASS INDEX DOCD: CPT | Mod: CPTII,S$GLB,, | Performed by: NURSE PRACTITIONER

## 2022-04-19 PROCEDURE — G0439 PR MEDICARE ANNUAL WELLNESS SUBSEQUENT VISIT: ICD-10-PCS | Mod: S$GLB,,, | Performed by: NURSE PRACTITIONER

## 2022-04-19 PROCEDURE — 1159F MED LIST DOCD IN RCRD: CPT | Mod: CPTII,S$GLB,, | Performed by: NURSE PRACTITIONER

## 2022-04-19 PROCEDURE — 3075F SYST BP GE 130 - 139MM HG: CPT | Mod: CPTII,S$GLB,, | Performed by: NURSE PRACTITIONER

## 2022-04-19 PROCEDURE — 1159F PR MEDICATION LIST DOCUMENTED IN MEDICAL RECORD: ICD-10-PCS | Mod: CPTII,S$GLB,, | Performed by: NURSE PRACTITIONER

## 2022-04-19 PROCEDURE — G0439 PPPS, SUBSEQ VISIT: HCPCS | Mod: S$GLB,,, | Performed by: NURSE PRACTITIONER

## 2022-04-19 PROCEDURE — 3078F DIAST BP <80 MM HG: CPT | Mod: CPTII,S$GLB,, | Performed by: NURSE PRACTITIONER

## 2022-04-19 PROCEDURE — 1100F PR PT FALLS ASSESS DOC 2+ FALLS/FALL W/INJURY/YR: ICD-10-PCS | Mod: CPTII,S$GLB,, | Performed by: NURSE PRACTITIONER

## 2022-04-19 PROCEDURE — 3075F PR MOST RECENT SYSTOLIC BLOOD PRESS GE 130-139MM HG: ICD-10-PCS | Mod: CPTII,S$GLB,, | Performed by: NURSE PRACTITIONER

## 2022-04-19 PROCEDURE — 3008F PR BODY MASS INDEX (BMI) DOCUMENTED: ICD-10-PCS | Mod: CPTII,S$GLB,, | Performed by: NURSE PRACTITIONER

## 2022-04-19 PROCEDURE — 99999 PR PBB SHADOW E&M-EST. PATIENT-LVL IV: CPT | Mod: PBBFAC,,, | Performed by: NURSE PRACTITIONER

## 2022-04-19 PROCEDURE — 1160F RVW MEDS BY RX/DR IN RCRD: CPT | Mod: CPTII,S$GLB,, | Performed by: NURSE PRACTITIONER

## 2022-04-19 PROCEDURE — 4010F ACE/ARB THERAPY RXD/TAKEN: CPT | Mod: CPTII,S$GLB,, | Performed by: NURSE PRACTITIONER

## 2022-04-19 PROCEDURE — 1160F PR REVIEW ALL MEDS BY PRESCRIBER/CLIN PHARMACIST DOCUMENTED: ICD-10-PCS | Mod: CPTII,S$GLB,, | Performed by: NURSE PRACTITIONER

## 2022-04-19 PROCEDURE — 3078F PR MOST RECENT DIASTOLIC BLOOD PRESSURE < 80 MM HG: ICD-10-PCS | Mod: CPTII,S$GLB,, | Performed by: NURSE PRACTITIONER

## 2022-04-19 PROCEDURE — 3288F FALL RISK ASSESSMENT DOCD: CPT | Mod: CPTII,S$GLB,, | Performed by: NURSE PRACTITIONER

## 2022-04-19 PROCEDURE — 4010F PR ACE/ARB THEARPY RXD/TAKEN: ICD-10-PCS | Mod: CPTII,S$GLB,, | Performed by: NURSE PRACTITIONER

## 2022-04-19 PROCEDURE — 1170F FXNL STATUS ASSESSED: CPT | Mod: CPTII,S$GLB,, | Performed by: NURSE PRACTITIONER

## 2022-04-19 PROCEDURE — 3288F PR FALLS RISK ASSESSMENT DOCUMENTED: ICD-10-PCS | Mod: CPTII,S$GLB,, | Performed by: NURSE PRACTITIONER

## 2022-04-19 PROCEDURE — 1100F PTFALLS ASSESS-DOCD GE2>/YR: CPT | Mod: CPTII,S$GLB,, | Performed by: NURSE PRACTITIONER

## 2022-04-19 PROCEDURE — 1170F PR FUNCTIONAL STATUS ASSESSED: ICD-10-PCS | Mod: CPTII,S$GLB,, | Performed by: NURSE PRACTITIONER

## 2022-04-19 PROCEDURE — 99999 PR PBB SHADOW E&M-EST. PATIENT-LVL IV: ICD-10-PCS | Mod: PBBFAC,,, | Performed by: NURSE PRACTITIONER

## 2022-04-19 NOTE — PATIENT INSTRUCTIONS
Counseling and Referral of Other Preventative  (Italic type indicates deductible and co-insurance are waived)    Patient Name: Torie Richard  Today's Date: 4/19/2022    Health Maintenance       Date Due Completion Date    Mammogram 08/06/2022 8/6/2021    DEXA Scan 05/06/2023 5/6/2020    Colorectal Cancer Screening 02/14/2025 2/14/2022    Override on 3/11/2013: Declined (prefers hemoccult)    Lipid Panel 01/26/2027 1/26/2022    TETANUS VACCINE 03/21/2028 3/21/2018        No orders of the defined types were placed in this encounter.    The following information is provided to all patients.  This information is to help you find resources for any of the problems found today that may be affecting your health:                Living healthy guide: www.[facility].[place].gov      Understanding Diabetes: www.diabetes.org      Eating healthy: www.cdc.gov/healthyweight      CDC home safety checklist: www.cdc.gov/steadi/patient.html      Agency on Aging: www.goea.[place].[facility]      Alcoholics anonymous (AA): www.aa.org      Physical Activity: www.basilia.nih.gov/fd4nswu      Tobacco use: www.quitwithusla.org

## 2022-04-19 NOTE — PROGRESS NOTES
"  Torie Richard presented for a  Medicare AWV and comprehensive Health Risk Assessment today. The following components were reviewed and updated:    · Medical history  · Family History  · Social history  · Allergies and Current Medications  · Health Risk Assessment  · Health Maintenance  · Care Team         ** See Completed Assessments for Annual Wellness Visit within the encounter summary.**         The following assessments were completed:  · Living Situation  · CAGE  · Depression Screening  · Timed Get Up and Go  · Whisper Test  · Cognitive Function Screening  · Nutrition Screening  · ADL Screening  · PAQ Screening            Vitals:    04/19/22 0937 04/19/22 1019   BP: (!) 164/70 138/72   Pulse: 75    SpO2: 100%    Weight: 64.6 kg (142 lb 6.7 oz)    Height: 5' 4" (1.626 m)      Body mass index is 24.45 kg/m².     Physical Exam  Vitals reviewed.   Constitutional:       Appearance: She is well-developed.   HENT:      Head: Normocephalic and atraumatic. Not macrocephalic and not microcephalic. No raccoon eyes, Alejo's sign, abrasion, contusion, right periorbital erythema, left periorbital erythema or laceration. Hair is normal.      Right Ear: No decreased hearing noted. No laceration, drainage, swelling or tenderness. No middle ear effusion. No foreign body. No mastoid tenderness. No hemotympanum. Tympanic membrane is not injected, scarred, perforated, erythematous, retracted or bulging. Tympanic membrane has normal mobility.      Left Ear: No decreased hearing noted. No laceration, drainage, swelling or tenderness.  No middle ear effusion. No foreign body. No mastoid tenderness. No hemotympanum. Tympanic membrane is not injected, scarred, perforated, erythematous, retracted or bulging. Tympanic membrane has normal mobility.      Nose: Nose normal. No nasal deformity, laceration or mucosal edema.      Mouth/Throat:      Pharynx: Uvula midline.   Eyes:      General: Lids are normal. No scleral icterus.     " Conjunctiva/sclera: Conjunctivae normal.   Neck:      Thyroid: No thyroid mass or thyromegaly.      Trachea: Trachea normal.   Cardiovascular:      Rate and Rhythm: Normal rate. Rhythm irregular.   Pulmonary:      Effort: Pulmonary effort is normal. No respiratory distress.      Breath sounds: Normal breath sounds.   Abdominal:      Palpations: Abdomen is soft.   Musculoskeletal:         General: Normal range of motion.      Cervical back: Neck supple. No edema or erythema. No spinous process tenderness or muscular tenderness. Normal range of motion.   Lymphadenopathy:      Head:      Right side of head: No submental, submandibular, tonsillar, preauricular or posterior auricular adenopathy.      Left side of head: No submental, submandibular, tonsillar, preauricular, posterior auricular or occipital adenopathy.   Skin:     General: Skin is warm and dry.   Neurological:      Mental Status: She is alert and oriented to person, place, and time.   Psychiatric:         Behavior: Behavior normal.         Thought Content: Thought content normal.         Judgment: Judgment normal.             Diagnoses and health risks identified today and associated recommendations/orders:    1. Encounter for preventive health examination  Annual Health Risk Assessment (HRA) visit today.  Counseling and referral of health maintenance and preventative health measures performed.  Patient given annual wellness paperwork to take home.  Encouraged to return in 1 year for subsequent HRA visit.     2. Essential hypertension, benign  Chronic. Stable. Controlled. Encouraged to increase exercise as tolerated (moderate-intensity aerobic activity and muscle-strengthening activities) improve diet to heart healthy, low sodium diet. Continue current treatment plan as previously prescribed by PCP.    3. Mixed hyperlipidemia  Chronic. Stable. Continue current treatment plan as previously prescribed by PCP.    4. Rosacea  Chronic. Stable. Continue current  treatment plan as previously prescribed by PCP.    5. Calculus of bile duct without cholecystitis and without obstruction  Chronic. Stable. Continue current treatment plan as previously prescribed by PCP.    6. Chronic low back pain with sciatica, sciatica laterality unspecified, unspecified back pain laterality  Chronic. Stable. Continue current treatment plan as previously prescribed by PCP.    7. Achilles tendonosis of right lower extremity  Chronic. Stable. Continue current treatment plan as previously prescribed by PCP.    8. Osteopenia of multiple sites  Chronic. Stable. Continue current treatment plan as previously prescribed by PCP.    9. Insomnia, unspecified type  Chronic. Stable. Continue current treatment plan as previously prescribed by PCP.        Provided Torie with a 5-10 year written screening schedule and personal prevention plan. Recommendations were developed using the USPSTF age appropriate recommendations. Education, counseling, and referrals were provided as needed. After Visit Summary printed and given to patient which includes a list of additional screenings\tests needed.      I offered to discuss end of life issues, including information on how to make advance directives that the patient could use to name someone who would make medical decisions on their behalf if they became too ill to make themselves.    ___Patient declined  _X_Patient is interested, I provided paper work and offered to discuss.    Follow up in about 1 year (around 4/19/2023).    John Ruiz NP  I offered to discuss advanced care planning, including how to pick a person who would make decisions for you if you were unable to make them for yourself, called a health care power of , and what kind of decisions you might make such as use of life sustaining treatments such as ventilators and tube feeding when faced with a life limiting illness recorded on a living will that they will need to know. (How you want  to be cared for as you near the end of your natural life)     X Patient is interested in learning more about how to make advanced directives.  I provided them paperwork and offered to discuss this with them.

## 2022-05-01 NOTE — TELEPHONE ENCOUNTER
No new care gaps identified.  Powered by [employer]. Reference number: [id number].   5/01/2022 8:23:46 AM CDT

## 2022-05-02 RX ORDER — SIMVASTATIN 20 MG/1
TABLET, FILM COATED ORAL
Qty: 90 TABLET | Refills: 2 | Status: SHIPPED | OUTPATIENT
Start: 2022-05-02 | End: 2023-01-21

## 2022-05-02 NOTE — TELEPHONE ENCOUNTER
Refill Authorization Note   Torie Richard  is requesting a refill authorization.  Brief Assessment and Rationale for Refill:  Approve     Medication Therapy Plan:       Medication Reconciliation Completed: No   Comments:     No Care Gaps recommended.     Note composed:2:17 PM 05/02/2022

## 2022-07-22 ENCOUNTER — PATIENT MESSAGE (OUTPATIENT)
Dept: INTERNAL MEDICINE | Facility: CLINIC | Age: 77
End: 2022-07-22
Payer: MEDICARE

## 2022-07-22 DIAGNOSIS — Z12.31 ENCOUNTER FOR SCREENING MAMMOGRAM FOR BREAST CANCER: Primary | ICD-10-CM

## 2022-07-25 ENCOUNTER — PATIENT MESSAGE (OUTPATIENT)
Dept: INTERNAL MEDICINE | Facility: CLINIC | Age: 77
End: 2022-07-25
Payer: MEDICARE

## 2022-07-25 RX ORDER — VALACYCLOVIR HYDROCHLORIDE 1 G/1
1000 TABLET, FILM COATED ORAL 2 TIMES DAILY
Qty: 10 TABLET | Refills: 1 | Status: SHIPPED | OUTPATIENT
Start: 2022-07-25 | End: 2023-02-14

## 2022-09-06 ENCOUNTER — HOSPITAL ENCOUNTER (OUTPATIENT)
Dept: RADIOLOGY | Facility: OTHER | Age: 77
Discharge: HOME | End: 2022-09-06
Attending: INTERNAL MEDICINE
Payer: MEDICARE

## 2022-09-06 DIAGNOSIS — Z12.31 ENCOUNTER FOR SCREENING MAMMOGRAM FOR BREAST CANCER: ICD-10-CM

## 2022-09-06 PROCEDURE — 77067 MAMMO DIGITAL SCREENING BILAT WITH TOMO: ICD-10-PCS | Mod: 26,,, | Performed by: RADIOLOGY

## 2022-09-06 PROCEDURE — 77063 BREAST TOMOSYNTHESIS BI: CPT | Mod: 26,,, | Performed by: RADIOLOGY

## 2022-09-06 PROCEDURE — 77067 SCR MAMMO BI INCL CAD: CPT | Mod: 26,,, | Performed by: RADIOLOGY

## 2022-09-06 PROCEDURE — 77067 SCR MAMMO BI INCL CAD: CPT | Mod: TC

## 2022-09-06 PROCEDURE — 77063 MAMMO DIGITAL SCREENING BILAT WITH TOMO: ICD-10-PCS | Mod: 26,,, | Performed by: RADIOLOGY

## 2022-12-31 ENCOUNTER — OFFICE VISIT (OUTPATIENT)
Dept: URGENT CARE | Facility: CLINIC | Age: 77
End: 2022-12-31
Payer: MEDICARE

## 2022-12-31 VITALS
TEMPERATURE: 99 F | DIASTOLIC BLOOD PRESSURE: 67 MMHG | HEIGHT: 64 IN | RESPIRATION RATE: 16 BRPM | BODY MASS INDEX: 23.9 KG/M2 | SYSTOLIC BLOOD PRESSURE: 153 MMHG | HEART RATE: 71 BPM | OXYGEN SATURATION: 97 % | WEIGHT: 140 LBS

## 2022-12-31 DIAGNOSIS — B37.31 CANDIDAL VULVOVAGINITIS: Primary | ICD-10-CM

## 2022-12-31 LAB
BILIRUB UR QL STRIP: NEGATIVE
GLUCOSE UR QL STRIP: NEGATIVE
KETONES UR QL STRIP: NEGATIVE
LEUKOCYTE ESTERASE UR QL STRIP: NEGATIVE
PH, POC UA: 5.5
POC BLOOD, URINE: NEGATIVE
POC NITRATES, URINE: NEGATIVE
PROT UR QL STRIP: NEGATIVE
SP GR UR STRIP: <=1.005 (ref 1–1.03)
UROBILINOGEN UR STRIP-ACNC: NORMAL (ref 0.1–1.1)

## 2022-12-31 PROCEDURE — 1160F PR REVIEW ALL MEDS BY PRESCRIBER/CLIN PHARMACIST DOCUMENTED: ICD-10-PCS | Mod: CPTII,S$GLB,, | Performed by: NURSE PRACTITIONER

## 2022-12-31 PROCEDURE — 1159F MED LIST DOCD IN RCRD: CPT | Mod: CPTII,S$GLB,, | Performed by: NURSE PRACTITIONER

## 2022-12-31 PROCEDURE — 4010F ACE/ARB THERAPY RXD/TAKEN: CPT | Mod: CPTII,S$GLB,, | Performed by: NURSE PRACTITIONER

## 2022-12-31 PROCEDURE — 87086 URINE CULTURE/COLONY COUNT: CPT | Performed by: NURSE PRACTITIONER

## 2022-12-31 PROCEDURE — 81003 URINALYSIS AUTO W/O SCOPE: CPT | Mod: QW,S$GLB,, | Performed by: NURSE PRACTITIONER

## 2022-12-31 PROCEDURE — 1126F AMNT PAIN NOTED NONE PRSNT: CPT | Mod: CPTII,S$GLB,, | Performed by: NURSE PRACTITIONER

## 2022-12-31 PROCEDURE — 1160F RVW MEDS BY RX/DR IN RCRD: CPT | Mod: CPTII,S$GLB,, | Performed by: NURSE PRACTITIONER

## 2022-12-31 PROCEDURE — 81003 POCT URINALYSIS, DIPSTICK, AUTOMATED, W/O SCOPE: ICD-10-PCS | Mod: QW,S$GLB,, | Performed by: NURSE PRACTITIONER

## 2022-12-31 PROCEDURE — 4010F PR ACE/ARB THEARPY RXD/TAKEN: ICD-10-PCS | Mod: CPTII,S$GLB,, | Performed by: NURSE PRACTITIONER

## 2022-12-31 PROCEDURE — 3077F PR MOST RECENT SYSTOLIC BLOOD PRESSURE >= 140 MM HG: ICD-10-PCS | Mod: CPTII,S$GLB,, | Performed by: NURSE PRACTITIONER

## 2022-12-31 PROCEDURE — 3078F PR MOST RECENT DIASTOLIC BLOOD PRESSURE < 80 MM HG: ICD-10-PCS | Mod: CPTII,S$GLB,, | Performed by: NURSE PRACTITIONER

## 2022-12-31 PROCEDURE — 99203 OFFICE O/P NEW LOW 30 MIN: CPT | Mod: S$GLB,,, | Performed by: NURSE PRACTITIONER

## 2022-12-31 PROCEDURE — 1159F PR MEDICATION LIST DOCUMENTED IN MEDICAL RECORD: ICD-10-PCS | Mod: CPTII,S$GLB,, | Performed by: NURSE PRACTITIONER

## 2022-12-31 PROCEDURE — 1126F PR PAIN SEVERITY QUANTIFIED, NO PAIN PRESENT: ICD-10-PCS | Mod: CPTII,S$GLB,, | Performed by: NURSE PRACTITIONER

## 2022-12-31 PROCEDURE — 3078F DIAST BP <80 MM HG: CPT | Mod: CPTII,S$GLB,, | Performed by: NURSE PRACTITIONER

## 2022-12-31 PROCEDURE — 99203 PR OFFICE/OUTPT VISIT, NEW, LEVL III, 30-44 MIN: ICD-10-PCS | Mod: S$GLB,,, | Performed by: NURSE PRACTITIONER

## 2022-12-31 PROCEDURE — 3077F SYST BP >= 140 MM HG: CPT | Mod: CPTII,S$GLB,, | Performed by: NURSE PRACTITIONER

## 2022-12-31 PROCEDURE — 81514 NFCT DS BV&VAGINITIS DNA ALG: CPT | Performed by: NURSE PRACTITIONER

## 2022-12-31 RX ORDER — CLOTRIMAZOLE AND BETAMETHASONE DIPROPIONATE 10; .64 MG/G; MG/G
CREAM TOPICAL 2 TIMES DAILY
Qty: 45 G | Refills: 0 | Status: SHIPPED | OUTPATIENT
Start: 2022-12-31 | End: 2023-02-14

## 2022-12-31 RX ORDER — FLUCONAZOLE 150 MG/1
TABLET ORAL
Qty: 3 TABLET | Refills: 0 | Status: SHIPPED | OUTPATIENT
Start: 2022-12-31 | End: 2023-02-14

## 2022-12-31 NOTE — PROGRESS NOTES
"Subjective:       Patient ID: Torie Richard is a 77 y.o. female.    Vitals:  height is 5' 4" (1.626 m) and weight is 63.5 kg (140 lb). Her temperature is 98.6 °F (37 °C). Her blood pressure is 153/67 (abnormal) and her pulse is 71. Her respiration is 16 and oxygen saturation is 97%.     Chief Complaint: Vaginitis    Pt is 76 yo female present today for possible vaginitis x2 days. She is experiencing itching and redness. No discharge present. She is also experiencing the urgent to urinate and when she does, she has a burning sensation. Pt has taken AZO yeast tablets, Miconazole nitrate, and cortisone cream with little relief.       Vaginal Itching  The patient's primary symptoms include genital itching. This is a new problem. The current episode started in the past 7 days. The problem occurs constantly. The problem has been unchanged. The pain is mild. Associated symptoms include dysuria and urgency. The vaginal discharge was normal. There has been no bleeding. She has not been passing clots. She has not been passing tissue. The symptoms are aggravated by urinating. She has tried antifungals and antibiotics (AZO yeast tablets, Miconazole nitrate, and cortisone) for the symptoms. The treatment provided no relief. She uses nothing for contraception. She is postmenopausal. Her past medical history is significant for vaginosis.   Genitourinary:  Positive for dysuria and urgency.     Objective:      Physical Exam   Constitutional: She is oriented to person, place, and time.  Non-toxic appearance. She does not appear ill. No distress.   HENT:   Head: Normocephalic.   Nose: Nose normal.   Mouth/Throat: Mucous membranes are moist.   Neck: No neck rigidity present.   Cardiovascular: Normal rate.   Pulmonary/Chest: Effort normal.   Abdominal: Normal appearance.   Genitourinary: There is rash and tenderness on the right labia. There is rash and tenderness on the left labia.    No vaginal discharge.     Musculoskeletal: " Normal range of motion.         General: Normal range of motion.   Neurological: She is alert and oriented to person, place, and time.   Skin: Skin is warm, dry and not diaphoretic.   Psychiatric: Her behavior is normal. Mood normal.   Nursing note and vitals reviewed.chaperone present (ALFIE Dang)         Results for orders placed or performed in visit on 12/31/22   POCT Urinalysis, Dipstick, Automated, W/O Scope   Result Value Ref Range    POC Blood, Urine Negative Negative    POC Bilirubin, Urine Negative Negative    POC Urobilinogen, Urine Normal 0.1 - 1.1    POC Ketones, Urine Negative Negative    POC Protein, Urine Negative Negative    POC Nitrates, Urine Negative Negative    POC Glucose, Urine Negative Negative    pH, UA 5.5     POC Specific Gravity, Urine <=1.005 1.003 - 1.029    POC Leukocytes, Urine Negative Negative      Assessment:       1. Candidal vulvovaginitis          Plan:         Candidal vulvovaginitis  -     POCT Urinalysis, Dipstick, Automated, W/O Scope  -     VAGINOSIS SCREEN BY DNA PROBE  -     CULTURE, URINE  -     clotrimazole-betamethasone 1-0.05% (LOTRISONE) cream; Apply topically 2 (two) times daily.  Dispense: 45 g; Refill: 0  -     fluconazole (DIFLUCAN) 150 MG Tab; Take one tablet by mouth on 12/31/2022; 01/03/2023 if symptomatic; and on 01/6/2023  if still symptomatic. Avoid alcohol for one week while taking the medicine  Dispense: 3 tablet; Refill: 0      Patient Instructions   Cotton underwear (no underwear at night)  Loose clothes  Gentle soap that is unscented   Avoid panty liners  Wipe front to back

## 2022-12-31 NOTE — PATIENT INSTRUCTIONS
Cotton underwear (no underwear at night)  Loose clothes  Gentle soap that is unscented   Avoid panty liners  Wipe front to back

## 2023-01-01 ENCOUNTER — TELEPHONE (OUTPATIENT)
Dept: URGENT CARE | Facility: CLINIC | Age: 78
End: 2023-01-01
Payer: MEDICARE

## 2023-01-01 LAB — BACTERIA UR CULT: NORMAL

## 2023-01-03 ENCOUNTER — TELEPHONE (OUTPATIENT)
Dept: URGENT CARE | Facility: CLINIC | Age: 78
End: 2023-01-03
Payer: MEDICARE

## 2023-01-03 LAB
BACTERIAL VAGINOSIS DNA: NEGATIVE
CANDIDA GLABRATA DNA: NEGATIVE
CANDIDA KRUSEI DNA: NEGATIVE
CANDIDA RRNA VAG QL PROBE: NEGATIVE
T VAGINALIS RRNA GENITAL QL PROBE: NEGATIVE

## 2023-01-05 ENCOUNTER — PES CALL (OUTPATIENT)
Dept: ADMINISTRATIVE | Facility: CLINIC | Age: 78
End: 2023-01-05
Payer: MEDICARE

## 2023-01-12 ENCOUNTER — OFFICE VISIT (OUTPATIENT)
Dept: INTERNAL MEDICINE | Facility: CLINIC | Age: 78
End: 2023-01-12
Payer: MEDICARE

## 2023-01-12 VITALS
WEIGHT: 139.13 LBS | HEIGHT: 64 IN | HEART RATE: 72 BPM | RESPIRATION RATE: 16 BRPM | SYSTOLIC BLOOD PRESSURE: 142 MMHG | BODY MASS INDEX: 23.75 KG/M2 | DIASTOLIC BLOOD PRESSURE: 84 MMHG

## 2023-01-12 DIAGNOSIS — M85.89 OSTEOPENIA OF MULTIPLE SITES: ICD-10-CM

## 2023-01-12 DIAGNOSIS — I10 ESSENTIAL HYPERTENSION, BENIGN: ICD-10-CM

## 2023-01-12 DIAGNOSIS — D69.2 SENILE PURPURA: ICD-10-CM

## 2023-01-12 DIAGNOSIS — R41.3 MEMORY LOSS: ICD-10-CM

## 2023-01-12 DIAGNOSIS — Z00.00 ENCOUNTER FOR PREVENTIVE HEALTH EXAMINATION: Primary | ICD-10-CM

## 2023-01-12 DIAGNOSIS — E78.2 MIXED HYPERLIPIDEMIA: Chronic | ICD-10-CM

## 2023-01-12 PROCEDURE — 3077F PR MOST RECENT SYSTOLIC BLOOD PRESSURE >= 140 MM HG: ICD-10-PCS | Mod: CPTII,S$GLB,, | Performed by: NURSE PRACTITIONER

## 2023-01-12 PROCEDURE — G0439 PPPS, SUBSEQ VISIT: HCPCS | Mod: S$GLB,,, | Performed by: NURSE PRACTITIONER

## 2023-01-12 PROCEDURE — 3288F FALL RISK ASSESSMENT DOCD: CPT | Mod: CPTII,S$GLB,, | Performed by: NURSE PRACTITIONER

## 2023-01-12 PROCEDURE — 1160F RVW MEDS BY RX/DR IN RCRD: CPT | Mod: CPTII,S$GLB,, | Performed by: NURSE PRACTITIONER

## 2023-01-12 PROCEDURE — 99499 RISK ADDL DX/OHS AUDIT: ICD-10-PCS | Mod: S$GLB,,, | Performed by: NURSE PRACTITIONER

## 2023-01-12 PROCEDURE — 99999 PR PBB SHADOW E&M-EST. PATIENT-LVL V: CPT | Mod: PBBFAC,,, | Performed by: NURSE PRACTITIONER

## 2023-01-12 PROCEDURE — 3077F SYST BP >= 140 MM HG: CPT | Mod: CPTII,S$GLB,, | Performed by: NURSE PRACTITIONER

## 2023-01-12 PROCEDURE — 1160F PR REVIEW ALL MEDS BY PRESCRIBER/CLIN PHARMACIST DOCUMENTED: ICD-10-PCS | Mod: CPTII,S$GLB,, | Performed by: NURSE PRACTITIONER

## 2023-01-12 PROCEDURE — 1159F MED LIST DOCD IN RCRD: CPT | Mod: CPTII,S$GLB,, | Performed by: NURSE PRACTITIONER

## 2023-01-12 PROCEDURE — 1170F PR FUNCTIONAL STATUS ASSESSED: ICD-10-PCS | Mod: CPTII,S$GLB,, | Performed by: NURSE PRACTITIONER

## 2023-01-12 PROCEDURE — 1170F FXNL STATUS ASSESSED: CPT | Mod: CPTII,S$GLB,, | Performed by: NURSE PRACTITIONER

## 2023-01-12 PROCEDURE — 3079F PR MOST RECENT DIASTOLIC BLOOD PRESSURE 80-89 MM HG: ICD-10-PCS | Mod: CPTII,S$GLB,, | Performed by: NURSE PRACTITIONER

## 2023-01-12 PROCEDURE — 99499 UNLISTED E&M SERVICE: CPT | Mod: S$GLB,,, | Performed by: NURSE PRACTITIONER

## 2023-01-12 PROCEDURE — 1159F PR MEDICATION LIST DOCUMENTED IN MEDICAL RECORD: ICD-10-PCS | Mod: CPTII,S$GLB,, | Performed by: NURSE PRACTITIONER

## 2023-01-12 PROCEDURE — 1101F PR PT FALLS ASSESS DOC 0-1 FALLS W/OUT INJ PAST YR: ICD-10-PCS | Mod: CPTII,S$GLB,, | Performed by: NURSE PRACTITIONER

## 2023-01-12 PROCEDURE — 3288F PR FALLS RISK ASSESSMENT DOCUMENTED: ICD-10-PCS | Mod: CPTII,S$GLB,, | Performed by: NURSE PRACTITIONER

## 2023-01-12 PROCEDURE — 3079F DIAST BP 80-89 MM HG: CPT | Mod: CPTII,S$GLB,, | Performed by: NURSE PRACTITIONER

## 2023-01-12 PROCEDURE — 1101F PT FALLS ASSESS-DOCD LE1/YR: CPT | Mod: CPTII,S$GLB,, | Performed by: NURSE PRACTITIONER

## 2023-01-12 PROCEDURE — G0439 PR MEDICARE ANNUAL WELLNESS SUBSEQUENT VISIT: ICD-10-PCS | Mod: S$GLB,,, | Performed by: NURSE PRACTITIONER

## 2023-01-12 PROCEDURE — 99999 PR PBB SHADOW E&M-EST. PATIENT-LVL V: ICD-10-PCS | Mod: PBBFAC,,, | Performed by: NURSE PRACTITIONER

## 2023-01-12 NOTE — PROGRESS NOTES
"  Torie Richard presented for a  Medicare AWV and comprehensive Health Risk Assessment today. The following components were reviewed and updated:    Medical history  Family History  Social history  Allergies and Current Medications  Health Risk Assessment  Health Maintenance  Care Team         ** See Completed Assessments for Annual Wellness Visit within the encounter summary.**         The following assessments were completed:  Living Situation  CAGE  Depression Screening  Timed Get Up and Go  Whisper Test  Cognitive Function Screening      Nutrition Screening  ADL Screening  PAQ Screening        Vitals:    01/12/23 1228   BP: (!) 142/84   Pulse: 72   Resp: 16   Weight: 63.1 kg (139 lb 1.8 oz)   Height: 5' 4" (1.626 m)   Pain: 0/10    Body mass index is 23.88 kg/m².  Physical Exam  Vitals reviewed.   Cardiovascular:      Rate and Rhythm: Normal rate and regular rhythm.      Pulses: Normal pulses.      Heart sounds: Normal heart sounds.   Pulmonary:      Effort: Pulmonary effort is normal.      Breath sounds: Normal breath sounds.   Abdominal:      General: Bowel sounds are normal.      Palpations: Abdomen is soft.      Tenderness: There is no abdominal tenderness.   Skin:     General: Skin is warm and dry.      Capillary Refill: Capillary refill takes less than 2 seconds.   Neurological:      Mental Status: She is alert.               Diagnoses and health risks identified today and associated recommendations/orders:    1. Encounter for preventive health examination  - Chart reviewed. Problem list updated. Discussed current medical diagnosis, current medications, medical/surgical/family/social history; updated provider list; documented vital signs; identified any cognitive impairment; and updated risk factor list. Addressed any outstanding health maintenance. Provided patient with personalized health advice. Continue to follow up with PCP and any specialists.     2. Senile purpura  Follow up with PCP.     3. Mixed " hyperlipidemia  Chronic; stable on Zocor. Follow up with PCP.    4. Essential hypertension, benign  Chronic; stable on losartan. Follow up with PCP.    5. Osteopenia of multiple sites  Continue supplementation per PCP recommendations. Weight bearing exercise discussed. Continue to follow up with PCP.    6. Memory loss  - Ambulatory referral/consult to Adult Neuropsychology; Future      Provided Torie with a 5-10 year written screening schedule and personal prevention plan. Recommendations were developed using the USPSTF age appropriate recommendations. Education, counseling, and referrals were provided as needed. After Visit Summary printed and given to patient which includes a list of additional screenings\tests needed.    Follow up in about 1 month (around 2/14/2023) for Annual with PCP.    Ximena Paz NP      I offered to discuss advanced care planning, including how to pick a person who would make decisions for you if you were unable to make them for yourself, called a health care power of , and what kind of decisions you might make such as use of life sustaining treatments such as ventilators and tube feeding when faced with a life limiting illness recorded on a living will that they will need to know. (How you want to be cared for as you near the end of your natural life)     X Patient is interested in learning more about how to make advanced directives.  I provided them paperwork and offered to discuss this with them.

## 2023-01-12 NOTE — PATIENT INSTRUCTIONS
Counseling and Referral of Other Preventative  (Italic type indicates deductible and co-insurance are waived)    Patient Name: Torie Richard  Today's Date: 1/12/2023    Health Maintenance       Date Due Completion Date    COVID-19 Vaccine (5 - Booster for Pfizer series) 01/12/2024 (Originally 6/29/2022) 5/4/2022    DEXA Scan 05/06/2023 5/6/2020    Mammogram 09/06/2023 9/6/2022    Colorectal Cancer Screening 02/14/2025 2/14/2022    Override on 3/11/2013: Declined (prefers hemoccult)    Lipid Panel 01/26/2027 1/26/2022    TETANUS VACCINE 03/21/2028 3/21/2018        Orders Placed This Encounter   Procedures    Ambulatory referral/consult to Adult Neuropsychology     The following information is provided to all patients.  This information is to help you find resources for any of the problems found today that may be affecting your health:                Living healthy guide: www.[facility].[place].[facility]      Understanding Diabetes: www.diabetes.org      Eating healthy: www.cdc.gov/healthyweight      CDC home safety checklist: www.cdc.gov/steadi/patient.html      Agency on Aging: www.goea.[place].[facility]      Alcoholics anonymous (AA): www.aa.org      Physical Activity: www.basilia.nih.gov/lt3bife      Tobacco use: www.quitwithusla.org

## 2023-01-21 RX ORDER — SIMVASTATIN 20 MG/1
TABLET, FILM COATED ORAL
Qty: 90 TABLET | Refills: 0 | Status: SHIPPED | OUTPATIENT
Start: 2023-01-21 | End: 2023-04-19

## 2023-01-21 NOTE — TELEPHONE ENCOUNTER
No new care gaps identified.  [facility] Embedded Care Gaps. Reference number: [id number]. 1/21/2023   8:42:03 AM CST

## 2023-01-21 NOTE — TELEPHONE ENCOUNTER
Refill Decision Note   Torie Richard  is requesting a refill authorization.  Brief Assessment and Rationale for Refill:  Approve     Medication Therapy Plan:       Medication Reconciliation Completed: No   Comments:     No Care Gaps recommended.     Note composed:3:25 PM 01/21/2023

## 2023-02-02 ENCOUNTER — LAB VISIT (OUTPATIENT)
Dept: LAB | Facility: OTHER | Age: 78
End: 2023-02-02
Attending: INTERNAL MEDICINE
Payer: MEDICARE

## 2023-02-02 DIAGNOSIS — E78.2 MIXED HYPERLIPIDEMIA: ICD-10-CM

## 2023-02-02 DIAGNOSIS — I10 ESSENTIAL HYPERTENSION, BENIGN: ICD-10-CM

## 2023-02-02 LAB
ALBUMIN SERPL BCP-MCNC: 4 G/DL (ref 3.5–5.2)
ALP SERPL-CCNC: 63 U/L (ref 55–135)
ALT SERPL W/O P-5'-P-CCNC: 15 U/L (ref 10–44)
ANION GAP SERPL CALC-SCNC: 7 MMOL/L (ref 8–16)
AST SERPL-CCNC: 19 U/L (ref 10–40)
BASOPHILS # BLD AUTO: 0.04 K/UL (ref 0–0.2)
BASOPHILS NFR BLD: 0.8 % (ref 0–1.9)
BILIRUB SERPL-MCNC: 0.5 MG/DL (ref 0.1–1)
BUN SERPL-MCNC: 12 MG/DL (ref 8–23)
CALCIUM SERPL-MCNC: 9.7 MG/DL (ref 8.7–10.5)
CHLORIDE SERPL-SCNC: 105 MMOL/L (ref 95–110)
CHOLEST SERPL-MCNC: 154 MG/DL (ref 120–199)
CHOLEST/HDLC SERPL: 2.8 {RATIO} (ref 2–5)
CO2 SERPL-SCNC: 28 MMOL/L (ref 23–29)
CREAT SERPL-MCNC: 0.8 MG/DL (ref 0.5–1.4)
DIFFERENTIAL METHOD: ABNORMAL
EOSINOPHIL # BLD AUTO: 0.3 K/UL (ref 0–0.5)
EOSINOPHIL NFR BLD: 5.5 % (ref 0–8)
ERYTHROCYTE [DISTWIDTH] IN BLOOD BY AUTOMATED COUNT: 12.2 % (ref 11.5–14.5)
EST. GFR  (NO RACE VARIABLE): >60 ML/MIN/1.73 M^2
GLUCOSE SERPL-MCNC: 93 MG/DL (ref 70–110)
HCT VFR BLD AUTO: 42.6 % (ref 37–48.5)
HDLC SERPL-MCNC: 56 MG/DL (ref 40–75)
HDLC SERPL: 36.4 % (ref 20–50)
HGB BLD-MCNC: 14.1 G/DL (ref 12–16)
IMM GRANULOCYTES # BLD AUTO: 0 K/UL (ref 0–0.04)
IMM GRANULOCYTES NFR BLD AUTO: 0 % (ref 0–0.5)
LDLC SERPL CALC-MCNC: 80.6 MG/DL (ref 63–159)
LYMPHOCYTES # BLD AUTO: 1.9 K/UL (ref 1–4.8)
LYMPHOCYTES NFR BLD: 36.5 % (ref 18–48)
MCH RBC QN AUTO: 31.2 PG (ref 27–31)
MCHC RBC AUTO-ENTMCNC: 33.1 G/DL (ref 32–36)
MCV RBC AUTO: 94 FL (ref 82–98)
MONOCYTES # BLD AUTO: 0.4 K/UL (ref 0.3–1)
MONOCYTES NFR BLD: 8 % (ref 4–15)
NEUTROPHILS # BLD AUTO: 2.6 K/UL (ref 1.8–7.7)
NEUTROPHILS NFR BLD: 49.2 % (ref 38–73)
NONHDLC SERPL-MCNC: 98 MG/DL
NRBC BLD-RTO: 0 /100 WBC
PLATELET # BLD AUTO: 234 K/UL (ref 150–450)
PMV BLD AUTO: 10.2 FL (ref 9.2–12.9)
POTASSIUM SERPL-SCNC: 4.3 MMOL/L (ref 3.5–5.1)
PROT SERPL-MCNC: 6.9 G/DL (ref 6–8.4)
RBC # BLD AUTO: 4.52 M/UL (ref 4–5.4)
SODIUM SERPL-SCNC: 140 MMOL/L (ref 136–145)
TRIGL SERPL-MCNC: 87 MG/DL (ref 30–150)
WBC # BLD AUTO: 5.26 K/UL (ref 3.9–12.7)

## 2023-02-02 PROCEDURE — 80061 LIPID PANEL: CPT | Performed by: INTERNAL MEDICINE

## 2023-02-02 PROCEDURE — 85025 COMPLETE CBC W/AUTO DIFF WBC: CPT | Performed by: INTERNAL MEDICINE

## 2023-02-02 PROCEDURE — 80053 COMPREHEN METABOLIC PANEL: CPT | Performed by: INTERNAL MEDICINE

## 2023-02-02 PROCEDURE — 36415 COLL VENOUS BLD VENIPUNCTURE: CPT | Performed by: INTERNAL MEDICINE

## 2023-02-10 NOTE — PROGRESS NOTES
Subjective:       Patient ID: Torie Richard is a 77 y.o. female.    Chief Complaint: Hypertension    Pt here for f/u.     HLD has been well controlled and tolerating zocor well.     Still using otc sleep aids with relief. Proper use again d/w pt and she understands.    Pt's BP is not well controlled. Tolerating meds well but didn't take this AM. Pt denies cp/sob/ha/vision or neuro changes. Not checking at home but will begin doing so.     She has mild osteopenia on DEXA 5/2020. Reiterated importance of ca/d.      She reports minor memory lapses occasionally over the last year. Not forgetting things that affect day to day responsibilities. Not getting lost while driving. No dangerous situations. She admits to more stress in life as she worries about her daughter and grandkids.     Hyperlipidemia  Pertinent negatives include no chest pain or shortness of breath.   Review of Systems   Constitutional:  Negative for activity change and unexpected weight change.   HENT:  Negative for hearing loss, rhinorrhea and trouble swallowing.    Eyes:  Negative for discharge and visual disturbance.   Respiratory:  Negative for chest tightness, shortness of breath and wheezing.    Cardiovascular:  Negative for chest pain and palpitations.   Gastrointestinal:  Negative for abdominal pain, blood in stool, constipation, diarrhea and vomiting.   Endocrine: Negative for polydipsia and polyuria.   Genitourinary:  Negative for difficulty urinating, dysuria, hematuria and menstrual problem.   Musculoskeletal:  Negative for arthralgias, joint swelling and neck pain.   Neurological:  Negative for weakness and headaches.   Psychiatric/Behavioral:  Negative for confusion and dysphoric mood.      Objective:      Physical Exam  Constitutional:       Appearance: She is well-developed.   HENT:      Head: Normocephalic and atraumatic.   Neck:      Thyroid: No thyromegaly.   Cardiovascular:      Rate and Rhythm: Normal rate and regular rhythm.       Heart sounds: Normal heart sounds.   Pulmonary:      Effort: Pulmonary effort is normal.      Breath sounds: Normal breath sounds.   Abdominal:      General: Bowel sounds are normal. There is no distension.      Palpations: Abdomen is soft. There is no mass.      Tenderness: There is no abdominal tenderness.   Musculoskeletal:      Cervical back: Neck supple.   Lymphadenopathy:      Cervical: No cervical adenopathy.   Neurological:      Mental Status: She is alert and oriented to person, place, and time.      Cranial Nerves: No cranial nerve deficit.   Psychiatric:         Behavior: Behavior normal.       Assessment:       1. Essential hypertension, benign    2. Mixed hyperlipidemia    3. Osteopenia of multiple sites    4. Chronic low back pain with sciatica, sciatica laterality unspecified, unspecified back pain laterality    5. Memory loss        Plan:       1. Recent labs reviewed; additional labs today    2. Home BP monitoring  3. Pt has already been referred to neuropsych so encouraged to complete that; suspect some of memory issues are due to stress; offered counseling and/or meds but she declines for now  4. Amlodipine 2.5mg daily; call for home BP readings in 2 wks

## 2023-02-14 ENCOUNTER — LAB VISIT (OUTPATIENT)
Dept: LAB | Facility: OTHER | Age: 78
End: 2023-02-14
Attending: INTERNAL MEDICINE
Payer: MEDICARE

## 2023-02-14 ENCOUNTER — OFFICE VISIT (OUTPATIENT)
Dept: INTERNAL MEDICINE | Facility: CLINIC | Age: 78
End: 2023-02-14
Payer: MEDICARE

## 2023-02-14 VITALS
HEIGHT: 64 IN | OXYGEN SATURATION: 98 % | HEART RATE: 63 BPM | WEIGHT: 141.31 LBS | SYSTOLIC BLOOD PRESSURE: 140 MMHG | BODY MASS INDEX: 24.13 KG/M2 | DIASTOLIC BLOOD PRESSURE: 90 MMHG

## 2023-02-14 DIAGNOSIS — R41.3 MEMORY LOSS: ICD-10-CM

## 2023-02-14 DIAGNOSIS — E78.2 MIXED HYPERLIPIDEMIA: Chronic | ICD-10-CM

## 2023-02-14 DIAGNOSIS — M54.40 CHRONIC LOW BACK PAIN WITH SCIATICA, SCIATICA LATERALITY UNSPECIFIED, UNSPECIFIED BACK PAIN LATERALITY: ICD-10-CM

## 2023-02-14 DIAGNOSIS — M85.89 OSTEOPENIA OF MULTIPLE SITES: ICD-10-CM

## 2023-02-14 DIAGNOSIS — I10 ESSENTIAL HYPERTENSION, BENIGN: ICD-10-CM

## 2023-02-14 DIAGNOSIS — I10 ESSENTIAL HYPERTENSION, BENIGN: Primary | ICD-10-CM

## 2023-02-14 DIAGNOSIS — G89.29 CHRONIC LOW BACK PAIN WITH SCIATICA, SCIATICA LATERALITY UNSPECIFIED, UNSPECIFIED BACK PAIN LATERALITY: ICD-10-CM

## 2023-02-14 LAB
ALBUMIN SERPL BCP-MCNC: 4.3 G/DL (ref 3.5–5.2)
ALP SERPL-CCNC: 67 U/L (ref 55–135)
ALT SERPL W/O P-5'-P-CCNC: 15 U/L (ref 10–44)
ANION GAP SERPL CALC-SCNC: 7 MMOL/L (ref 8–16)
AST SERPL-CCNC: 17 U/L (ref 10–40)
BILIRUB SERPL-MCNC: 0.3 MG/DL (ref 0.1–1)
BUN SERPL-MCNC: 17 MG/DL (ref 8–23)
CALCIUM SERPL-MCNC: 9.8 MG/DL (ref 8.7–10.5)
CHLORIDE SERPL-SCNC: 108 MMOL/L (ref 95–110)
CHOLEST SERPL-MCNC: 163 MG/DL (ref 120–199)
CHOLEST/HDLC SERPL: 2.7 {RATIO} (ref 2–5)
CO2 SERPL-SCNC: 26 MMOL/L (ref 23–29)
CREAT SERPL-MCNC: 0.8 MG/DL (ref 0.5–1.4)
EST. GFR  (NO RACE VARIABLE): >60 ML/MIN/1.73 M^2
GLUCOSE SERPL-MCNC: 94 MG/DL (ref 70–110)
HDLC SERPL-MCNC: 61 MG/DL (ref 40–75)
HDLC SERPL: 37.4 % (ref 20–50)
LDLC SERPL CALC-MCNC: 79 MG/DL (ref 63–159)
NONHDLC SERPL-MCNC: 102 MG/DL
POTASSIUM SERPL-SCNC: 4.2 MMOL/L (ref 3.5–5.1)
PROT SERPL-MCNC: 7.2 G/DL (ref 6–8.4)
SODIUM SERPL-SCNC: 141 MMOL/L (ref 136–145)
TRIGL SERPL-MCNC: 115 MG/DL (ref 30–150)
TSH SERPL DL<=0.005 MIU/L-ACNC: 1.14 UIU/ML (ref 0.4–4)
VIT B12 SERPL-MCNC: 278 PG/ML (ref 210–950)

## 2023-02-14 PROCEDURE — 3288F FALL RISK ASSESSMENT DOCD: CPT | Mod: CPTII,S$GLB,, | Performed by: INTERNAL MEDICINE

## 2023-02-14 PROCEDURE — 84443 ASSAY THYROID STIM HORMONE: CPT | Performed by: INTERNAL MEDICINE

## 2023-02-14 PROCEDURE — 36415 COLL VENOUS BLD VENIPUNCTURE: CPT | Performed by: INTERNAL MEDICINE

## 2023-02-14 PROCEDURE — 3077F SYST BP >= 140 MM HG: CPT | Mod: CPTII,S$GLB,, | Performed by: INTERNAL MEDICINE

## 2023-02-14 PROCEDURE — 3288F PR FALLS RISK ASSESSMENT DOCUMENTED: ICD-10-PCS | Mod: CPTII,S$GLB,, | Performed by: INTERNAL MEDICINE

## 2023-02-14 PROCEDURE — 3080F PR MOST RECENT DIASTOLIC BLOOD PRESSURE >= 90 MM HG: ICD-10-PCS | Mod: CPTII,S$GLB,, | Performed by: INTERNAL MEDICINE

## 2023-02-14 PROCEDURE — 80053 COMPREHEN METABOLIC PANEL: CPT | Performed by: INTERNAL MEDICINE

## 2023-02-14 PROCEDURE — 1160F PR REVIEW ALL MEDS BY PRESCRIBER/CLIN PHARMACIST DOCUMENTED: ICD-10-PCS | Mod: CPTII,S$GLB,, | Performed by: INTERNAL MEDICINE

## 2023-02-14 PROCEDURE — 1126F AMNT PAIN NOTED NONE PRSNT: CPT | Mod: CPTII,S$GLB,, | Performed by: INTERNAL MEDICINE

## 2023-02-14 PROCEDURE — 99214 PR OFFICE/OUTPT VISIT, EST, LEVL IV, 30-39 MIN: ICD-10-PCS | Mod: S$GLB,,, | Performed by: INTERNAL MEDICINE

## 2023-02-14 PROCEDURE — 82607 VITAMIN B-12: CPT | Performed by: INTERNAL MEDICINE

## 2023-02-14 PROCEDURE — 3077F PR MOST RECENT SYSTOLIC BLOOD PRESSURE >= 140 MM HG: ICD-10-PCS | Mod: CPTII,S$GLB,, | Performed by: INTERNAL MEDICINE

## 2023-02-14 PROCEDURE — 1160F RVW MEDS BY RX/DR IN RCRD: CPT | Mod: CPTII,S$GLB,, | Performed by: INTERNAL MEDICINE

## 2023-02-14 PROCEDURE — 80061 LIPID PANEL: CPT | Performed by: INTERNAL MEDICINE

## 2023-02-14 PROCEDURE — 1101F PR PT FALLS ASSESS DOC 0-1 FALLS W/OUT INJ PAST YR: ICD-10-PCS | Mod: CPTII,S$GLB,, | Performed by: INTERNAL MEDICINE

## 2023-02-14 PROCEDURE — 3080F DIAST BP >= 90 MM HG: CPT | Mod: CPTII,S$GLB,, | Performed by: INTERNAL MEDICINE

## 2023-02-14 PROCEDURE — 1101F PT FALLS ASSESS-DOCD LE1/YR: CPT | Mod: CPTII,S$GLB,, | Performed by: INTERNAL MEDICINE

## 2023-02-14 PROCEDURE — 1159F MED LIST DOCD IN RCRD: CPT | Mod: CPTII,S$GLB,, | Performed by: INTERNAL MEDICINE

## 2023-02-14 PROCEDURE — 99214 OFFICE O/P EST MOD 30 MIN: CPT | Mod: S$GLB,,, | Performed by: INTERNAL MEDICINE

## 2023-02-14 PROCEDURE — 99999 PR PBB SHADOW E&M-EST. PATIENT-LVL IV: ICD-10-PCS | Mod: PBBFAC,,, | Performed by: INTERNAL MEDICINE

## 2023-02-14 PROCEDURE — 1126F PR PAIN SEVERITY QUANTIFIED, NO PAIN PRESENT: ICD-10-PCS | Mod: CPTII,S$GLB,, | Performed by: INTERNAL MEDICINE

## 2023-02-14 PROCEDURE — 99999 PR PBB SHADOW E&M-EST. PATIENT-LVL IV: CPT | Mod: PBBFAC,,, | Performed by: INTERNAL MEDICINE

## 2023-02-14 PROCEDURE — 1159F PR MEDICATION LIST DOCUMENTED IN MEDICAL RECORD: ICD-10-PCS | Mod: CPTII,S$GLB,, | Performed by: INTERNAL MEDICINE

## 2023-02-14 RX ORDER — IBUPROFEN 200 MG
200 TABLET ORAL EVERY 6 HOURS PRN
COMMUNITY

## 2023-02-14 RX ORDER — NAPROXEN SODIUM 220 MG
220 TABLET ORAL 2 TIMES DAILY WITH MEALS
COMMUNITY

## 2023-02-14 RX ORDER — AMLODIPINE BESYLATE 2.5 MG/1
2.5 TABLET ORAL DAILY
Qty: 90 TABLET | Refills: 3 | Status: SHIPPED | OUTPATIENT
Start: 2023-02-14 | End: 2024-01-16

## 2023-03-01 ENCOUNTER — TELEPHONE (OUTPATIENT)
Dept: INTERNAL MEDICINE | Facility: CLINIC | Age: 78
End: 2023-03-01
Payer: MEDICARE

## 2023-03-01 ENCOUNTER — PATIENT MESSAGE (OUTPATIENT)
Dept: INTERNAL MEDICINE | Facility: CLINIC | Age: 78
End: 2023-03-01
Payer: MEDICARE

## 2023-03-01 VITALS — SYSTOLIC BLOOD PRESSURE: 127 MMHG | DIASTOLIC BLOOD PRESSURE: 72 MMHG

## 2023-03-01 NOTE — TELEPHONE ENCOUNTER
"----- Message from Bree Retana MA sent at 2/14/2023 10:19 AM CST -----  Regarding: bp  Pls call pt for home BP readings   Three attempts then a letter   If there are any controlled readings, pls add one to "remote BP' in encounter  If most or all are uncontrolled, route to provider to advise further  If most are controlled, let pt know "This looks good! Please continue to monitor at home, and let us know if you're consistently running at or above 140 on top, or 90 on the bottom."      "

## 2023-03-27 RX ORDER — LOSARTAN POTASSIUM 100 MG/1
TABLET ORAL
Qty: 90 TABLET | Refills: 3 | Status: SHIPPED | OUTPATIENT
Start: 2023-03-27 | End: 2024-03-15 | Stop reason: SDUPTHER

## 2023-03-27 NOTE — TELEPHONE ENCOUNTER
No new care gaps identified.  [facility] Embedded Care Gaps. Reference number: [id number]. 3/27/2023   12:27:33 AM LEANNT

## 2023-03-27 NOTE — TELEPHONE ENCOUNTER
Refill Decision Note   Torie Richard  is requesting a refill authorization.  Brief Assessment and Rationale for Refill:  Approve     Medication Therapy Plan:       Medication Reconciliation Completed: No   Comments:     No Care Gaps recommended.     Note composed:10:52 AM 03/27/2023

## 2023-04-24 ENCOUNTER — OFFICE VISIT (OUTPATIENT)
Dept: NEUROLOGY | Facility: CLINIC | Age: 78
End: 2023-04-24
Payer: MEDICARE

## 2023-04-24 DIAGNOSIS — R41.3 MEMORY LOSS: Primary | ICD-10-CM

## 2023-04-24 PROCEDURE — 99499 UNLISTED E&M SERVICE: CPT | Mod: 95,,, | Performed by: CLINICAL NEUROPSYCHOLOGIST

## 2023-04-24 PROCEDURE — 96116 NUBHVL XM PHYS/QHP 1ST HR: CPT | Mod: FQ,95,, | Performed by: CLINICAL NEUROPSYCHOLOGIST

## 2023-04-24 PROCEDURE — 99499 NO LOS: ICD-10-PCS | Mod: 95,,, | Performed by: CLINICAL NEUROPSYCHOLOGIST

## 2023-04-24 PROCEDURE — 96116 PR NEUROBEHAVIORAL STATUS EXAM BY PSYCH/PHYS: ICD-10-PCS | Mod: FQ,95,, | Performed by: CLINICAL NEUROPSYCHOLOGIST

## 2023-04-24 NOTE — PROGRESS NOTES
NEUROPSYCHOLOGICAL EVALUATION - CONFIDENTIAL    Referring Provider: Ximena Paz NP  Medical Necessity: Evaluate cognitive and emotional functioning, participate in treatment planning/management, and provide supportive therapy in the setting of memory loss  Date Conducted: 2023  Present At Visit: the patient   [id number] = 31 minutes minutes  Referral Diagnoses: R41.3 (ICD-10-CM) - Memory loss  Consent: The patient expressed an understanding of the purpose of the evaluation and consented to all procedures. We discussed the limits of confidentiality and discussed an emergency plan.    Telemedicine Details:   Established Patient - Audio Only Telehealth Visit   The patient location is: [place]  The chief complaint leading to consultation is: memory loss  Visit type: Virtual visit with audio only (telephone)  Total time spent with patient: 31 minutes   The reason for the audio only service rather than synchronous audio and video virtual visit was related to technical difficulties or patient preference/necessity.   Each patient to whom I provide medical services by telemedicine is: (1) informed of the relationship between the physician and patient and the respective role of any other health care provider with respect to management of the patient; and (2) notified that they may decline to receive medical services by telemedicine and may withdraw from such care at any time. Patient verbally consented to receive this service via voice-only telephone call.    ASSESSMENT & PLAN:   Ms. Torie Richard is an 77 y.o., female with 15 years of education and pertinent medical history including hyperlipidemia, hypertension, and insomnia who was referred for a neuropsychological evaluation in the setting of memory loss.    Full report to follow completion of testing. Highest on differential is subjective cognitive impairment but mild cognitive impairment in differential. Taking Unisom among other other the counter  sleep aids, many of which can have a deleterious impact on cognition.     Patient wondering if [facility] is any good. Will have to look up research on this.     Problem List Items Addressed This Visit    None  Visit Diagnoses       Memory loss    -  Primary          Thank you for allowing me to assist in Ms. Torie Richard's care. If you have any questions, please contact me at [phone].      Nina Knutson, PhD  Licensed Clinical Neuropsychologist  [facility]     CLINICAL INTERVIEW & RECORD REVIEW:     Cognitive Functioning   Cognitive screener: none  Previous evaluation(s): none  Onset & course of difficulty: The patient has noticed changes in the past year and believes they are getting a little worse  First symptom/problem observed: forgetfulness  Fluctuations: none  Examples:   Attention/Working Memory/Executive Functioning: No changes in attention, planning ahead, organization.   Processing Speed: no problems identified   Language: no problems identified. Some things she needs to re-read to retain.  Visuospatial: always had a poor sense of direction. Not worsening. When she was younger she got lost coming out of [employer] 3 times.   Learning & Memory: forgetting what she was about to do when she walks into room. Tracing her steps sometimes helps to jog her memory. Sometimes losing her train of thought in conversation. Misplacing items around her house. Looking for car keys and glasses and not sure where she put them.   Exacerbating factors: some sleep problems  Ameliorating factors: none  Medication for cognition: none     Daily Functioning   ADLs:    Bathing: Independent and without difficulty  Dressing: Independent and without difficulty  Grooming: Independent and without difficulty   Toileting: Independent and without difficulty  Transferring: Independent and without difficulty.  Eating: Independent and without difficulty.   IADLs:   "  Finances: Independent and without difficulty. Several on autodraft. Has a system to track them.   Medication Mgmt: Independent and without difficulty. Uses a pillbox to stay organized.   Driving: Independent and without difficulty  Household Mgmt: Independent and without difficulty Cooking/Meal Preparation: Independent and without difficulty.  Shopping: Independent and without difficulty.  Appointment Mgmt: has to write everything down. Sticky notes everywhere, but she is able to manage with this and her calendar.   Other: daughter relies on her a lot to bring grandchildren to and from school. Brings them food as well.      Psychiatric/Neuropsychiatric Symptoms   Mood: "most of the time a good mood"   Depression: no, not unless there is a cause for it. One of her brothers is ill right now so she has been feeling sad.   Elke/Hypomania: no  Anxiety: not a big worrier  Stress: doesn't believe she is stressed  Social Withdrawal: no  Neurovegetative Sxs:  Appetite: no changes.   Sleep: relies on over the counter sleep aids and does sleep better with them. Early morning awakenings. Trouble falling back asleep after using restroom. Probably around 7 hours a night. No sleep apnea. Not acting out her dreams.   Energy: normal Hallucinations: no  Delusional/Paranoid Thinking: no  Impulsivity: no  Obsessive/Compulsive Behaviors: no  Disinhibition: no  Irritability/Agitation: no  Aggression: no  Apathy/Indifference: no  Other changes in personality: no     Physical Functioning   Tremor: no  Difficulty walking: no  Imbalance: no  Falls: no  Weakness: no  Trouble with fine motor movements: no Lightheadedness: no  Urinary Urgency: no  Sensory Sxs: no  Pain: nothing regular, just minor  Physical Exercise Routine: no. Was on a walking program walking a mile a day. Stopped over the winter but wants to get back to it.      RELEVANT HISTORY  This patient has a past medical history of Hyperlipidemia and Hypertension.    Past Surgical " History:   Procedure Laterality Date     SECTION      CHOLECYSTECTOMY  05/15/2019    ERCP N/A 2019    Procedure: ERCP (ENDOSCOPIC RETROGRADE CHOLANGIOPANCREATOGRAPHY);  Surgeon: Jr Cantor MD;  [facility]  Service: Endoscopy;  Laterality: N/A;    ERCP N/A 2019    Procedure: ERCP (ENDOSCOPIC RETROGRADE CHOLANGIOPANCREATOGRAPHY);  Surgeon: Jr Cantor MD;  [facility]  Service: Endoscopy;  Laterality: N/A;    EXCISIONAL HEMORRHOIDECTOMY      EYE SURGERY      LAPAROSCOPIC CHOLECYSTECTOMY N/A 2019    Procedure: CHOLECYSTECTOMY, LAPAROSCOPIC;  Surgeon: Tayo Guadarrama MD;  [facility];  Service: General;  Laterality: N/A;    TONSILLECTOMY      TUBAL LIGATION  1981     Neurological History    Headaches/Migraines: no  TBI: no  Seizures: no  Stroke: no  Tumor: no   Previous Episodes of Delirium: no  Movement Disorder: no  CNS Infection: no  Other: no       Neurodiagnostics   No results found for this or any previous visit.    Pertinent Lab Work     Lab Results   Component Value Date    [id number] 278 2023     No results found for: RPR  No results found for: FOLATE  Lab Results   Component Value Date    TSH 1.137 2023     No results found for: LABA1C, HGBA1C  No results found for: HIV1X2, [id number]    Medications     Current Outpatient Medications   Medication Instructions    acetaminophen (TYLENOL) 500 mg, Oral, Every 6 hours PRN    amLODIPine (NORVASC) 2.5 mg, Oral, Daily    doxylamine succinate (UNISOM) 12.5 mg, Oral    ibuprofen (ADVIL,MOTRIN) 200 mg, Oral, Every 6 hours PRN    losartan (COZAAR) 100 MG tablet TAKE 1 TABLET BY MOUTH EVERY DAY    melatonin (MELATIN) 5 mg, Oral    naproxen sodium (ALEVE) 220 mg, Oral, 2 times daily with meals, Taking Prn    simvastatin (ZOCOR) 20 MG tablet TAKE 1 TABLET BY MOUTH EVERY DAY IN THE EVENING     Psychiatric History   Prior Diagnoses: none  History of Trauma/Abuse: none, but  did have a somewhat troubled marriage   History of Suicide Attempts: no  Current Ideation, Intention, or Plan: no  Homicidal Ideation: no Medication(s): no  Hospitalization(s): no  Psychotherapy/Counseling: no  Other: no       Substance Use History     Social History     Tobacco Use    Smoking status: Former     Packs/day: 0.50     Years: 15.00     Pack years: 7.50     Types: Cigarettes     Start date: 1966     Quit date: 10/18/1981     Years since quittin.5    Smokeless tobacco: Never   Substance and Sexual Activity    Alcohol use: Not Currently     Comment: maybe 1 glass of wine once a year    Drug use: Never    Sexual activity: Not Currently     Partners: Male     Birth control/protection: Abstinence     History of abuse/overuse: recently bought herself a bottle chocolate wine and drinks a few ounces here or there.     Family Neurological & Psychiatric History       Family History   Problem Relation Age of Onset    Hypertension Mother     Hyperlipidemia Mother             Sudden death Mother     Heart disease Father         MI at 58    Hypertension Father     Hyperlipidemia Father             Diabetes Father             Hypertension Sister     Hyperlipidemia Sister     Depression Sister     Arthritis Sister         bilateral knees    No Known Problems Sister     COPD Brother     Alcohol abuse Brother     Hyperlipidemia Brother     Hypertension Brother     Hyperlipidemia Brother     Hyperlipidemia Brother     Hypertension Brother     Hyperlipidemia Brother     Hypertension Brother     Hypertension Brother     Hyperlipidemia Brother     Depression Brother     Hyperlipidemia Brother     Hypertension Brother     Depression Daughter     Alcohol abuse Daughter     Nephrolithiasis Son     Breast cancer Maternal Aunt     Breast cancer Paternal Aunt     Drug abuse Brother     Colon cancer Neg Hx     Ovarian cancer Neg Hx     Early death Neg Hx      Neurologic: Negative for heritable risk  factors.   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   Family Status: Not currently . 2 children (48 and 42). 2 grandchildren (16 and 12)  Support System: good - family. Close to her sisters and brothers and children.   Hobbies/Activities: stays busy - [employer] and belongs to the hospitality committee.   Current Living Situation: lives at home with her 82 year old brother lives with her     Legal History   Current: has not completed any legal documents yet but plans to do so.     OBJECTIVE:     MENTAL STATUS AND OBSERVATIONS:   Appearance: Unable to assess   Alertness: Attentive and alert.   Orientation:   O x 4    Gait:  Unable to assess   Psychomotor:  Unable to assess   Handedness:  Right   Vision & Hearing:  Adequate for session   Speech/language: Normal in rate, rhythm, tone, and volume. No significant word finding difficulty observed. Comprehension was normal.   Mood/Affect:  The patients mood and affect were congruent and euthymic.   Interpersonal Behavior:  Rapport was quickly and easily established    Suicidality/Homicidality: Denied   Hallucinations/Delusions:  None evidenced or endorsed   Thought Content: Logical   Though Processes: Goal-directed   Insight & Judgment:  Appropriate   Participation in Interview:  Full     PROCEDURES/TESTS ADMINISTERED: Performed a review of pertinent medical records, reviewed limits to confidentiality, conducted a clinical interview, and  explained procedures.                      This service was not originating from a related E/M service provided within the previous 7 days nor will  to an E/M service or procedure within the next 24 hours or my soonest available appointment.  Prevailing standard of care was able to be met in this audio-only visit.

## 2023-05-01 ENCOUNTER — OFFICE VISIT (OUTPATIENT)
Dept: NEUROLOGY | Facility: CLINIC | Age: 78
End: 2023-05-01
Payer: MEDICARE

## 2023-05-01 DIAGNOSIS — R41.3 MEMORY LOSS: ICD-10-CM

## 2023-05-01 PROCEDURE — 96138 PR PSYCH/NEUROPSYCH TEST ADMIN/SCORING, BY TECH, 2+ TESTS, 1ST 30 MIN: ICD-10-PCS | Mod: S$GLB,,, | Performed by: CLINICAL NEUROPSYCHOLOGIST

## 2023-05-01 PROCEDURE — 96139 PR PSYCH/NEUROPSYCH TEST ADMIN/SCORING, BY TECH, 2+ TESTS, EA ADDTL 30 MIN: ICD-10-PCS | Mod: S$GLB,,, | Performed by: CLINICAL NEUROPSYCHOLOGIST

## 2023-05-01 PROCEDURE — 99999 PR PBB SHADOW E&M-EST. PATIENT-LVL I: ICD-10-PCS | Mod: PBBFAC,,, | Performed by: CLINICAL NEUROPSYCHOLOGIST

## 2023-05-01 PROCEDURE — 96138 PSYCL/NRPSYC TECH 1ST: CPT | Mod: S$GLB,,, | Performed by: CLINICAL NEUROPSYCHOLOGIST

## 2023-05-01 PROCEDURE — 96133 PR NEUROPSYCHOLOGIC TEST EVAL SVCS, EA ADDTL HR: ICD-10-PCS | Mod: S$GLB,,, | Performed by: CLINICAL NEUROPSYCHOLOGIST

## 2023-05-01 PROCEDURE — 96132 PR NEUROPSYCHOLOGIC TEST EVAL SVCS, 1ST HR: ICD-10-PCS | Mod: S$GLB,,, | Performed by: CLINICAL NEUROPSYCHOLOGIST

## 2023-05-01 PROCEDURE — 96132 NRPSYC TST EVAL PHYS/QHP 1ST: CPT | Mod: S$GLB,,, | Performed by: CLINICAL NEUROPSYCHOLOGIST

## 2023-05-01 PROCEDURE — 96139 PSYCL/NRPSYC TST TECH EA: CPT | Mod: S$GLB,,, | Performed by: CLINICAL NEUROPSYCHOLOGIST

## 2023-05-01 PROCEDURE — 99499 NO LOS: ICD-10-PCS | Mod: S$GLB,,, | Performed by: CLINICAL NEUROPSYCHOLOGIST

## 2023-05-01 PROCEDURE — 96133 NRPSYC TST EVAL PHYS/QHP EA: CPT | Mod: S$GLB,,, | Performed by: CLINICAL NEUROPSYCHOLOGIST

## 2023-05-01 PROCEDURE — 99499 UNLISTED E&M SERVICE: CPT | Mod: S$GLB,,, | Performed by: CLINICAL NEUROPSYCHOLOGIST

## 2023-05-01 PROCEDURE — 99999 PR PBB SHADOW E&M-EST. PATIENT-LVL I: CPT | Mod: PBBFAC,,, | Performed by: CLINICAL NEUROPSYCHOLOGIST

## 2023-05-08 NOTE — PROGRESS NOTES
NEUROPSYCHOLOGICAL EVALUATION - CONFIDENTIAL    Referring Provider: Ximena Paz NP  Medical Necessity: Evaluate cognitive and emotional functioning, participate in treatment planning/management, and provide supportive therapy in the setting of memory loss  Date Conducted: 4/24/2023 & 5/1/2023  Present At Visit: the patient   Referral Diagnoses: R41.3 (ICD-10-CM) - Memory loss  Consent: The patient expressed an understanding of the purpose of the evaluation and consented to all procedures. We discussed the limits of confidentiality and discussed an emergency plan.    ASSESSMENT & PLAN:   Ms. Torie Richard is an 77 y.o., female with 15 years of education and pertinent medical history including hyperlipidemia, hypertension, and insomnia who was referred for a neuropsychological evaluation in the setting of memory loss.    Compared to high average range premorbid estimates (based on both demographic information and a word reading test), results of the current evaluation reveal a largely intact and at expectation cognitive profile. Her confrontation naming was reduced and her delayed recall/memory was a bit variable. That said, she is still consolidating new information. Temporal orientation was intact and she was a strong historian.     Overall, she does not meet criteria for a neurocognitive disorder diagnosis. Suspicion for an emerging neurodegenerative condition is low, but we can monitor over time to ensure this is the case. She is taking Unisom among other over-the-counter sleep aids, many of which can have a deleterious impact on cognition. I believe this to be a primary culprit of cognitive trouble and she is encouraged to discuss sleep medications with her prescribing physicians to determine if something else with lower risk of cognitive burden can be substituted. She is additionally encouraged to focus on behaviors that promote brain healthy, including returning to her walking program. Information on  brain health behaviors, tips on how to manage vascular risk factors (like hyperlipidemia and hypertension), cognitive tips and strategies, and a list of brain training applications with research showing they help to improve cognition are included at the end of this report. These are all helpful in keeping her cognition as sharp as possible. Re-evaluation in 1 year. This evaluation can serve as a baseline for comparison at that time. She is welcome to return for a check-in at any time to update treatment planning.      Problem List Items Addressed This Visit    None  Visit Diagnoses       Memory loss              Thank you for allowing me to assist in Ms. Torie Richard's care. If you have any questions, please contact me at [phone].      Nina Knutson, PhD  Licensed Clinical Neuropsychologist  [facility]     CLINICAL INTERVIEW & RECORD REVIEW:     Cognitive Functioning   Cognitive screener: none  Previous evaluation(s): none  Onset & course of difficulty: The patient has noticed changes in the past year and believes they are getting a little worse  First symptom/problem observed: forgetfulness  Fluctuations: none  Examples:   Attention/Working Memory/Executive Functioning: No changes in attention, planning ahead, organization.   Processing Speed: no problems identified   Language: no problems identified. Some things she needs to re-read to retain.  Visuospatial: always had a poor sense of direction. Not worsening. When she was younger she got lost coming out of [employer] 3 times.   Learning & Memory: forgetting what she was about to do when she walks into room. Tracing her steps sometimes helps to jog her memory. Sometimes losing her train of thought in conversation. Misplacing items around her house. Looking for car keys and glasses and not sure where she put them.   Exacerbating factors: some sleep problems  Ameliorating factors: none  Medication  "for cognition: none     Daily Functioning   ADLs:    Bathing: Independent and without difficulty  Dressing: Independent and without difficulty  Grooming: Independent and without difficulty   Toileting: Independent and without difficulty  Transferring: Independent and without difficulty.  Eating: Independent and without difficulty.   IADLs:    Finances: Independent and without difficulty. Several on autodraft. Has a system to track them.   Medication Mgmt: Independent and without difficulty. Uses a pillbox to stay organized.   Driving: Independent and without difficulty  Household Mgmt: Independent and without difficulty Cooking/Meal Preparation: Independent and without difficulty.  Shopping: Independent and without difficulty.  Appointment Mgmt: has to write everything down. Sticky notes everywhere, but she is able to manage with this and her calendar.   Other: daughter relies on her a lot to bring grandchildren to and from school. Brings them food as well.      Psychiatric/Neuropsychiatric Symptoms   Mood: "most of the time a good mood"   Depression: no, not unless there is a cause for it. One of her brothers is ill right now so she has been feeling sad.   Elke/Hypomania: no  Anxiety: not a big worrier  Stress: doesn't believe she is stressed  Social Withdrawal: no  Neurovegetative Sxs:  Appetite: no changes.   Sleep: relies on over the counter sleep aids and does sleep better with them. Early morning awakenings. Trouble falling back asleep after using restroom. Probably around 7 hours a night. No sleep apnea. Not acting out her dreams.   Energy: normal Hallucinations: no  Delusional/Paranoid Thinking: no  Impulsivity: no  Obsessive/Compulsive Behaviors: no  Disinhibition: no  Irritability/Agitation: no  Aggression: no  Apathy/Indifference: no  Other changes in personality: no     Physical Functioning   Tremor: no  Difficulty walking: no  Imbalance: no  Falls: no  Weakness: no  Trouble with fine motor movements: " no Lightheadedness: no  Urinary Urgency: no  Sensory Sxs: no  Pain: nothing regular, just minor  Physical Exercise Routine: no. Was on a walking program walking a mile a day. Stopped over the winter but wants to get back to it.      RELEVANT HISTORY  This patient has a past medical history of Hyperlipidemia and Hypertension.    Past Surgical History:   Procedure Laterality Date     SECTION      CHOLECYSTECTOMY  05/15/2019    ERCP N/A 2019    Procedure: ERCP (ENDOSCOPIC RETROGRADE CHOLANGIOPANCREATOGRAPHY);  Surgeon: Jr Cantor MD;  Location: [address]);  Service: Endoscopy;  Laterality: N/A;    ERCP N/A 2019    Procedure: ERCP (ENDOSCOPIC RETROGRADE CHOLANGIOPANCREATOGRAPHY);  Surgeon: Jr Cantor MD;  Location: [address]);  Service: Endoscopy;  Laterality: N/A;    EXCISIONAL HEMORRHOIDECTOMY      EYE SURGERY      LAPAROSCOPIC CHOLECYSTECTOMY N/A 2019    Procedure: CHOLECYSTECTOMY, LAPAROSCOPIC;  Surgeon: Tayo Guadarrama MD;  Location: [facility] OR [address] [place] [address];  Service: General;  Laterality: N/A;    TONSILLECTOMY      TUBAL LIGATION  1981     Neurological History    Headaches/Migraines: no  TBI: no  Seizures: no  Stroke: no  Tumor: no   Previous Episodes of Delirium: no  Movement Disorder: no  CNS Infection: no  Other: no       Neurodiagnostics   No results found for this or any previous visit.    Pertinent Lab Work     Lab Results   Component Value Date    [id number] 278 2023     No results found for: RPR  No results found for: FOLATE  Lab Results   Component Value Date    TSH 1.137 2023     No results found for: LABA1C, HGBA1C  No results found for: HIV1X2, XTE98WCDO    Medications     Current Outpatient Medications   Medication Instructions    acetaminophen (TYLENOL) 500 mg, Oral, Every 6 hours PRN    amLODIPine (NORVASC) 2.5 mg, Oral, Daily    doxylamine succinate (UNISOM) 12.5 mg, Oral    ibuprofen (ADVIL,MOTRIN) 200 mg, Oral, Every 6 hours PRN     losartan (COZAAR) 100 MG tablet TAKE 1 TABLET BY MOUTH EVERY DAY    melatonin (MELATIN) 5 mg, Oral    naproxen sodium (ALEVE) 220 mg, Oral, 2 times daily with meals, Taking Prn    simvastatin (ZOCOR) 20 MG tablet TAKE 1 TABLET BY MOUTH EVERY DAY IN THE EVENING     Psychiatric History   Prior Diagnoses: none  History of Trauma/Abuse: none, but did have a somewhat troubled marriage   History of Suicide Attempts: no  Current Ideation, Intention, or Plan: no  Homicidal Ideation: no Medication(s): no  Hospitalization(s): no  Psychotherapy/Counseling: no  Other: no       Substance Use History     Social History     Tobacco Use    Smoking status: Former     Packs/day: 0.50     Years: 15.00     Pack years: 7.50     Types: Cigarettes     Start date: 1966     Quit date: 10/18/1981     Years since quittin.5    Smokeless tobacco: Never   Substance and Sexual Activity    Alcohol use: Not Currently     Comment: maybe 1 glass of wine once a year    Drug use: Never    Sexual activity: Not Currently     Partners: Male     Birth control/protection: Abstinence     History of abuse/overuse: recently bought herself a bottle chocolate wine and drinks a few ounces here or there.     Family Neurological & Psychiatric History       Family History   Problem Relation Age of Onset    Hypertension Mother     Hyperlipidemia Mother             Sudden death Mother     Heart disease Father         MI at 58    Hypertension Father     Hyperlipidemia Father             Diabetes Father             Hypertension Sister     Hyperlipidemia Sister     Depression Sister     Arthritis Sister         bilateral knees    No Known Problems Sister     COPD Brother     Alcohol abuse Brother     Hyperlipidemia Brother     Hypertension Brother     Hyperlipidemia Brother     Hyperlipidemia Brother     Hypertension Brother     Hyperlipidemia Brother     Hypertension Brother     Hypertension Brother     Hyperlipidemia Brother      Depression Brother     Hyperlipidemia Brother     Hypertension Brother     Depression Daughter     Alcohol abuse Daughter     Nephrolithiasis Son     Breast cancer Maternal Aunt     Breast cancer Paternal Aunt     Drug abuse Brother     Colon cancer Neg Hx     Ovarian cancer Neg Hx     Early death Neg Hx      Neurologic: Negative for heritable risk factors.   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   Family Status: Not currently . 2 children (48 and 42). 2 grandchildren (16 and 12)  Support System: good - family. Close to her sisters and brothers and children.   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   Current Living Situation: lives at home with her 82 year old brother lives with her     Legal History   Current: has not completed any legal documents yet but plans to do so.     OBJECTIVE:     MENTAL STATUS AND OBSERVATIONS:   Appearance: Appropriate to setting   Alertness: Attentive and alert.   Orientation:   O x 4 across both evaluation days   Gait:  Independent   Psychomotor:  Unremarkable   Handedness:  Right   Vision & Hearing:  Wore reading glasses on the day of testing. Hearing was adequate for session   Speech/language: Normal in rate, rhythm, tone, and volume. No significant word finding difficulty observed. Comprehension was normal during the clinical interview though she required some elaboration and clarification of complex task  "instructions to ensure her comprehension during testing.    Mood/Affect:  The patients mood and affect were congruent and euthymic during the clinical interview. She appeared anxious on the day of testing and required some additional reassurance from the examiner.    Interpersonal Behavior:  Rapport was quickly and easily established    Suicidality/Homicidality: Denied   Hallucinations/Delusions:  None evidenced or endorsed   Thought Content: Logical   Though Processes: Goal-directed   Insight & Judgment:  Appropriate   Participation in Interview:  Full     PROCEDURES/TESTS ADMINISTERED: In addition to performing a review of pertinent medical records, reviewing limits to confidentiality, conducting a clinical interview, and explaining procedures, the following measures were administered by RAMONA Banuelos, a trained psychometrician/psychometrist under the direct supervision of Dr. Knutson: Mini Mental Status Examination (MMSE); MSVT; Test of Premorbid Functioning (TOPF); Wechsler Adult Intelligence Scale, Fourth Edition (WAIS-IV) [Digit Span, Arithmetic, and Symbol Search subtests]; Brief Visuospatial Memory Test-Revised (BVMT-R, form 1); Repeatable Battery for the Assessment of Neuropsychological Status (RBANS, form A); Neuropsychological Assessment Battery (NAB) [Naming subtest, form 1]; Verbal fluency tests (FAS & animal naming; Luis F et al., 2004 norms);  Complex Figure Test (RCFT) [copy only]; Trail Making Test, parts A and B (Luis F et al., 2004 norms); [place] Card Sorting Test -64 card version (WCST-64); Geriatric Depression Scale (GDS-30); and Generalized Anxiety Disorder - 7 Item Scale (PATRICA-7). Manual norms were used unless otherwise indicated.      TEST TAKING BEHAVIOR AND VALIDITY: During testing, the patient often signed and took deep breaths. She made comments during memory testing such as, "it's gone. I can't remember" and "I have no idea what the rest of them are...I'm so pitiful." She " "had some trouble coming up with names of common objects and often described the picture. She was able to guess 2 more with semantic cueing and 2 more with phonemic cueing. She commented that she was "totally confused" while working on a novel, nonverbal problem solving task. Overall, she was observed to underestimate her performances. She worked at an average pace and persevered throughout the session. Scores on stand-alone and embedded performance validity measures were within normal limits. The current results, therefore, are likely an accurate reflection of the patient's current functioning.    TEST RESULTS    Raw Score Type of Standardized Score Standardized Score Percentile/CP Descriptor   MSVT  - - - -   MSVT  - - - -   MSVT Cons 100 - - - -   MSVT  - - - -   MSVT FR 60 - - - -   ACS RDS 7 - - - -   RBANS EI 0 - -      PREMORBID FUNCTIONING Raw Score Type of Standardized Score Standardized Score Percentile/CP Descriptor   TOPF simple dem. eFSIQ -  68 Average   TOPF pred. eFSIQ -  87 High Average   TOPF simple + pred. eFSIQ -  82 High Average   COGNITIVE SCREENING Raw Score Type of Standardized Score Standardized Score Percentile/CP Descriptor   MMSE 28 - - - WNL   Orientation - Place 5/5 - - - -   Orientation - Date 5/5 - - - -   RBANS         Immediate Memory - SS 83 13 Low Average   VS/Construction - SS 96 39 Average   Language - SS 92 30 Average   Attention - SS 94 34 Average   Delayed Memory - SS 78 7 Below Average   Total Scale - SS 84 14 Low Average   LANGUAGE FUNCTIONING Raw Score Type of Standardized Score Standardized Score Percentile/CP Descriptor   RBANS Naming 10 ss - 51-75 Average   RBANS Semantic Fluency 15 ss 7 16 Low Average   TOPF Word Reading 61  90 High Average   NAB Naming 26 Tscore 35 7 Below Average   FAS 32 Tscore 42 21 Low Average   Animal Naming 16 Tscore 45 31 Average   VISUOSPATIAL FUNCTIONING Raw Score Type of Standardized Score Standardized " Score Percentile/CP Descriptor   RBANS Line Orientation  14 ss - 17-25 Low Average   RBANS Figure Copy 19 ss 12 75 High Average   RCFT Copy 29 - - >16 WNL   RCFT Time to Copy 95 - - >16 WNL   BVMT-R Copy 12 - - - -   LEARNING & MEMORY Raw Score Type of Standardized Score Standardized Score Percentile/CP Descriptor   RBANS         Immediate Memory - SS 83 13 Low Average   Delayed Memory - SS 78 7 Below Average   List Learning (3, 5, 7, 7) 22 ss 7 16 Low Average   List Recall 3 ss - 17-25 Low Average   List Recognition 18 ss - 17-25 Low Average   Story Memory (6, 7) 13 ss 7 16 Low Average   Story Recall 7 ss 8 25 Average   Story Recognition  11 - - 47-68 Average   Figure Recall 5 ss 5 5 Below Average   Figure Recognition 1 - - - -   BVMT-R         IR (0, 3, 2) 5 Tscore 24 0.5 Exceptionally Low    DR 1 Tscore 23 0.3 Exceptionally Low    Discrimination Index 4 - - 11-16 Low Average   ATTENTION/WORKING MEMORY Raw Score Type of Standardized Score Standardized Score Percentile/CP Descriptor   WAIS-IV WMI - SS 97 42 Average   WAIS-IV Digit Span 21 ss 9 37 Average         DS Forward 7 ss 7 16 Low Average         DS Backward 8 ss 10 50 Average         DS Sequence 6 ss 9 37 Average         Longest Digit Forward 5 - - - -         Longest Digit Backward 5 - - - -         Longest Digit Sequence 5 - - - -   WAIS-IV Arithmetic 12 ss 10 50 Average   RBANS Digit Span  9 ss 8 25 Average   MENTAL PROCESSING SPEED Raw Score Type of Standardized Score Standardized Score Percentile/CP Descriptor   WAIS-IV Symbol Search 24 ss 11 63 Average   RBANS Coding 40 ss 10 50 Average   TMT A  27 Tscore 59 82 High Average   TMT A errors 0 - - - -   EXECUTIVE FUNCTIONING Raw Score Type of Standardized Score Standardized Score Percentile/CP Descriptor   TMT B 84 Tscore 54 66 Average   TMT B errors 1 - - - -   WCST-64         Total Correct 32 SS - - -   Total Errors 32 SS 84 14 Low Average   Perseverative Resp. 18 SS 95 37 Average   Perseverative Err.  18 SS 90 25 Average   Nonperseverative Err. 14 SS 85 16 Low Average   Concept. Level Response 27 SS 91 27 Average   Categories Completed 2 - - >16 WNL   FMS 0 - - - WNL   Learning to Learn -31.70 - - <1 Exceptionally Low   MOOD & PERSONALITY Raw Score Type of Standardized Score Standardized Score Percentile/CP Descriptor   GDS-30 9 - - - WNL   PATRICA-7 4 - - - WNL   ss = scaled score (mean = 10, SD = 3); SS = standard score (mean = 100, SD = 15); Tscore mean = 50, SD = 10; zscore (mean = 0.00, SD = 1)  It is important to note that scores/percentiles should only be interpreted by a neuropsychologist. It is common for healthy individuals to have 1-3 isolated low/unusual scores that are not indicative of any significant cognitive dysfunction.       BILLING  Code Description Minutes Units   23934 Psychiatric Interview 0    78957 Nubhvl xm phys/qhp 1st hr 0    48946 Nubhvl xm phy/qhp ea addl hr 0    16329 Psycl tst eval phys/qhp 1st 0    60072 Psycl tst eval phys/qhp ea 0    74453 Nrpsyc tst eval phys/qhp 1st 60 1   28209 Nrpsyc tst eval phys/qhp ea 97 2     Referral review/test selection 30      Tech consult/test review/modifications 10      Patient limitation management 0      Patient behavior management 0      Patient symptom monitoring 0      Record Review/Integration/Report Generation 86      Face-to-Face interpretive Feedback 31    03943 Psycl/nrpsyc tst phy/qhp 1st 0    72258 Psycl/nrpsyc tst phy/qhp ea 0    76930 Psycl/nrpsyc tech 1st 30 1   82404 Psycl/nrpsyc tst tech ea 128 4

## 2023-05-15 ENCOUNTER — PATIENT MESSAGE (OUTPATIENT)
Dept: NEUROLOGY | Facility: CLINIC | Age: 78
End: 2023-05-15
Payer: MEDICARE

## 2023-05-15 NOTE — PATIENT INSTRUCTIONS
MANAGING VASCULAR RISK FACTORS  A personal history of disorders that affect the cardiovascular system (e.g., hypertension, high cholesterol, diabetes, heart disease) can have a negative impact on brain functioning especially over many years. Therefore, it is very important for this patient to maintain good control over his/her risk factors. The following is recommended:  Take all medications as prescribed and follow-up with recommendations above.  Get regular physical exercise to the extent that it is possible. Family may need to structure this into their loved one's day or week and develop a transportation plan.  Eat a well-balanced diet and following the MIND diet (see handout) has been shown to be most brain protective.   Check your blood pressure, cholesterol levels, blood sugar, and others as appropriate.    PRACTICE GOOD COGNITIVE HYGIENE  Engage in regular exercise, which increases alertness and arousal and can improve attention and focus.  Consider lower impact exercises, such as yoga or light walking. Try to exercise for at least 150 minutes per week  Get a good night's sleep, as this can enhance alertness and cognition.  Eat healthy foods and balanced meals. It is notable that research indicates certain nutrients may aid in brain function, such as B vitamins (especially B6, B12, and folic acid), antioxidants (such as vitamins C and E, and beta carotene), and Omega-3 fatty acids. Here are some common tips for diet (Adopted from Tereza et al, [place], 2018):  Eat primarily plant-based foods, such as fruits and vegetables, whole grains, legumes   (beans) and nuts.  Limit refined carbohydrates (white pasta, bread, rice).  Replace butter with healthy fats such as olive oil.  Use herbs and spices instead of salt to flavor foods.  Limit red meat and processed meats to no more than a few times a month.  Avoid sugary sodas, bakery goods, and sweets.  Eat fish and poultry at least twice a week.  Keep your brain  active. Find activities to stay mentally active, such as reading, games (cards, checkers), puzzles (crosswords, Sudoku, jig saw), crafts (models, woodworking), gardening, or participating in activities in the community.  Stay socially engaged. Continue staying active with your family and friends.    RESOURCE  Consider purchasing the following books on brain health:     High-Octane Brain: 5 Science-Based Steps to Sharpen Your Memory and Reduce Your Risk of Alzheimer's by Amanda Friedman, PhD, ABPP-CN.     Keep Your Wits About You: The Science of Brain Maintenance As You Age by Diana Granados, PhD.     The Brain Health Book: Using the Power of Neuroscience to Improve Your Life by Jr Urbina, PhD, ABPP-CN.     Consider listening to Brain Beat, a pod cast series by the National [employer] featuring discussions with experts on brain health and functioning.     COGNITIVE TIPS AND STRATEGIES  The following tips and strategies are provided to help assist in daily activities:      Attention: Remember that inattention and lack of focus are major culprits to forgetting information so be sure and practice paying attention for adequate learning of information. If you rely on passive attention to remembering something (e.g., yeah, uh-huh approach), you'll find you cannot recall it later. I recommend the following to improve attention, which may aid in later recall:  1. Reduce distractions in the area as much as possible  2. Look at the person as they are speaking to you.   3. Paraphrase as they are speaking  4. Write down important pieces of information   5. Ask them to repeat if you zone out.  6. Have them simplify and reduce information that you need to attend to during conversation.  7. Have visual cues to remind you if you need to do something later.     Processing Speed:  1. Using multiple modalities (e.g., listening, writing notes, asking questions, recording) to learn new information is  likely to allow additional time for processing, thus improving memory for the material.   2. Allowing sufficient time to complete tasks will reduce frustration and help to ensure completion.     Executive Functionin. Don't attempt to multi-task.  Separate tasks so that each can be completed one at a time  2. Consider using a calendar/day planner, as that may be effective to help you plan and stay on track.  Color-coding specific tasks by importance may add additional benefit to your planner  3. Break down large projects into smaller tasks and write down the steps to completing the task.  Taking notes while reading can help with recall.     Storing Information: Use the below strategies to help you further enhance how information is stored  1. Rehearse - Immediately after seeing/hearing something, try to recall it.  Wait a few minutes, then check again.  Gradually lengthen the intervals between rehearsals.  2. Repetition of learned material is critical to ensure storage of information to be learned. Self-test at home to ensure learning.  3. Write down important information to improve your attention and focus and to have something to look back on when you need to recall it.  4. Make sure the person doesn't rattle off, but presents in a clear, logical, and unhurried manner.      Recalling Information:  1. Jog your memory - Lose something?  Think back to when you last had it.  What did you do next?  And after that?  Mentally walk yourself through each activity that followed.  Prodding your memory this way may enable you to recall the location of the missing item.  2. Use a cue - Symbolic reminders (the proverbial string around the finger) are helpful.  So too are memos, timers, calendar notes, etc.--keep them in visible, appropriate place  3. Get organized - Have fixed locations for all important papers, key phone numbers, medications, keys, wallet, glasses, tools, etc.  4. Develop routines - Routines can anchor  memories so they do not drift away.       Word Finding:   Not being able to find a word when you need it is a common and very annoying problem. It is not strictly a memory issue, but more a filing and retrieval issue. You know the word or name you want, but it cannot be found in your brain file. It is like having all the files in your file cabinet emptied on the floor. Every piece of information is there but finding it can be a challenge.   One strategy that may help is working with a speech pathologist/therapist to learn techniques to decrease word finding problems. Some of those strategies/techniques include the following:  Use circumlocutions. Describe the object you're trying to name instead of naming it.  Recite you're A, B, Cs. Go through the alphabet to see if a letter triggers finding the word.  Picture it. Try to visualize the spelling or writing of the word.  Relax. The harder you try to force yourself to come up with the word, the more frustrated you get and the worse you function.   Write it down. In situations where using correct words is critical, such as communicating on the radio while flying, write down the key words so that they are in front of you if needed.  Use word association. For words or names you continually misfile and can't find, try to come up with a word association, something that reminds you of the word or name.  Write a script. Try scripting something important that you want to say. Either write it out or practice it beforehand, so that you get it right.  Play word games. Doing crossword puzzles and playing word finding games may help your brain become more efficient at filing and retrieving words.     BRAIN TRAINING APPS  · BrainSaberr (https://www.[place].[employer]/) - Brain"[employer]Q has more than two dozen brain-training exercises organized into six categories: Attention, Brain Speed, Memory, People Skills, Intelligence, and Navigation. It allows you to fit brain exercises into your busy  life, and access brain training on most internet-connected devices. Plus, each exercise continuously adapts to your unique performance. So you train at the right level for you.     · Mind Games (https://www.mindgames.com/) - Mind Games is a great collection of games based in part on principles derived from cognitive tasks to help you practice different mental skills. This includes a handful of free games. Additionally, there are a number of trial games included that can be played 3 times. All games include your score history and a graph of your progress. Using some principles of standardized testing, your scores are also converted to a standard scale so that you can see where you need work and excel. The training center does the work for you by picking the perfect mix of exercises to keep you engaged.     · Elevate (https://Texert.com/) - Elevate was selected by [employer] as Pavel of the Year! Elevate is a brain training program designed to improve focus, speaking abilities, processing speed, memory, math skills, and more. Each person is provided with a personalized training program that adjusts over time to maximize results. Theoretically, the more you train with Elevate, the more you'll improve critical cognitive skills that are proven to boost productivity, earning power, and self-confidence. Users who train at least 3 times per week have reported dramatic gains and increased confidence. In-pavel purchases.     · Peak (https://www.Sensipass.net/) - Reach Peak performance with over 40 unique games, each one developed by neuroscientists and game experts to challenge your cognitive skills and push you further. Use , the  for your brain, to find the right workout for you at the right time. Choose from 's best recommendations to push your skills to the max. Or take contextual workouts like Coffee Break if you're short on time.  will help you track your progress using in-depth insights and keep  you going when you need it most. Play for free or upgrade to Pro and get the best brain training experience available. In-ketan purchases.     OTHER SUGGESTIONS  Games and Apps like Sudoku; Crossword Puzzles; Word Find; Memory Games; Logic Games; Solitaire; and Hidden Object Mysteries. Find some you like and play them. It will exercise many of the skills necessary to improve function.

## 2023-05-19 ENCOUNTER — TELEPHONE (OUTPATIENT)
Dept: NEUROLOGY | Facility: CLINIC | Age: 78
End: 2023-05-19
Payer: MEDICARE

## 2023-05-19 NOTE — TELEPHONE ENCOUNTER
NEUROPSYCHOLOGICAL EVALUATION FEEDBACK    TELEMEDICINE DETAILS:   The patient location is: [place]  The chief complaint leading to consultation is: feedback regarding neuropsychological test results  Visit type: Virtual visit with synchronous audio and video  Total time spent with patient: 31 minutes  Each patient to whom he or she provides medical services by telemedicine is: (1) informed of the relationship between the physician and patient and the respective role of any other health care provider with respect to management of the patient; and (2) notified that he or she may decline to receive medical services by telemedicine and may withdraw from such care at any time.  Notes: See below.    Torie Richard attended a feedback session today.  We discussed the results of the neuropsychological evaluation and I gave time to discuss questions and concerns. For full evaluation details, please see the note from this provider dated 5/1/2023. A copy of the report was provided via theDrop.     Problem List Items Addressed This Visit    None        Nina Knutson, PhD  Licensed Clinical Neuropsychologist  [facility] - Department of Neurology

## 2023-06-22 ENCOUNTER — TELEPHONE (OUTPATIENT)
Dept: INTERNAL MEDICINE | Facility: CLINIC | Age: 78
End: 2023-06-22
Payer: MEDICARE

## 2023-06-22 NOTE — TELEPHONE ENCOUNTER
----- Message from Jones Jefferson sent at 6/22/2023 11:09 AM CDT -----  Name of Who is Calling:FITZ GILLILAND [[id number]]                What is the request in detail: Patient want to establish care with you since her provider is leaving [facility] but there are no available appt.Please call back to further assist.                 Can the clinic reply by [facility]: No                What Number to Call Back if not in [facility]:[phone]

## 2023-07-05 ENCOUNTER — OFFICE VISIT (OUTPATIENT)
Dept: INTERNAL MEDICINE | Facility: CLINIC | Age: 78
End: 2023-07-05
Payer: MEDICARE

## 2023-07-05 VITALS
HEIGHT: 64 IN | WEIGHT: 140.88 LBS | SYSTOLIC BLOOD PRESSURE: 130 MMHG | OXYGEN SATURATION: 95 % | BODY MASS INDEX: 24.05 KG/M2 | HEART RATE: 80 BPM | DIASTOLIC BLOOD PRESSURE: 80 MMHG

## 2023-07-05 DIAGNOSIS — E78.2 MIXED HYPERLIPIDEMIA: ICD-10-CM

## 2023-07-05 DIAGNOSIS — G89.29 CHRONIC NONINTRACTABLE HEADACHE, UNSPECIFIED HEADACHE TYPE: ICD-10-CM

## 2023-07-05 DIAGNOSIS — G89.29 CHRONIC LEFT SHOULDER PAIN: ICD-10-CM

## 2023-07-05 DIAGNOSIS — E53.8 VITAMIN B12 DEFICIENCY: ICD-10-CM

## 2023-07-05 DIAGNOSIS — E55.9 VITAMIN D DEFICIENCY: ICD-10-CM

## 2023-07-05 DIAGNOSIS — G47.00 INSOMNIA, UNSPECIFIED TYPE: ICD-10-CM

## 2023-07-05 DIAGNOSIS — Z12.31 SCREENING MAMMOGRAM FOR BREAST CANCER: ICD-10-CM

## 2023-07-05 DIAGNOSIS — R51.9 CHRONIC NONINTRACTABLE HEADACHE, UNSPECIFIED HEADACHE TYPE: ICD-10-CM

## 2023-07-05 DIAGNOSIS — M25.512 CHRONIC LEFT SHOULDER PAIN: ICD-10-CM

## 2023-07-05 DIAGNOSIS — Z00.00 HEALTH MAINTENANCE EXAMINATION: Primary | ICD-10-CM

## 2023-07-05 DIAGNOSIS — I10 PRIMARY HYPERTENSION: ICD-10-CM

## 2023-07-05 DIAGNOSIS — M85.80 OSTEOPENIA, UNSPECIFIED LOCATION: ICD-10-CM

## 2023-07-05 PROCEDURE — 99999 PR PBB SHADOW E&M-EST. PATIENT-LVL IV: ICD-10-PCS | Mod: PBBFAC,,, | Performed by: STUDENT IN AN ORGANIZED HEALTH CARE EDUCATION/TRAINING PROGRAM

## 2023-07-05 PROCEDURE — 1101F PR PT FALLS ASSESS DOC 0-1 FALLS W/OUT INJ PAST YR: ICD-10-PCS | Mod: CPTII,S$GLB,, | Performed by: STUDENT IN AN ORGANIZED HEALTH CARE EDUCATION/TRAINING PROGRAM

## 2023-07-05 PROCEDURE — 3079F DIAST BP 80-89 MM HG: CPT | Mod: CPTII,S$GLB,, | Performed by: STUDENT IN AN ORGANIZED HEALTH CARE EDUCATION/TRAINING PROGRAM

## 2023-07-05 PROCEDURE — 99999 PR PBB SHADOW E&M-EST. PATIENT-LVL IV: CPT | Mod: PBBFAC,,, | Performed by: STUDENT IN AN ORGANIZED HEALTH CARE EDUCATION/TRAINING PROGRAM

## 2023-07-05 PROCEDURE — 3075F PR MOST RECENT SYSTOLIC BLOOD PRESS GE 130-139MM HG: ICD-10-PCS | Mod: CPTII,S$GLB,, | Performed by: STUDENT IN AN ORGANIZED HEALTH CARE EDUCATION/TRAINING PROGRAM

## 2023-07-05 PROCEDURE — 99214 OFFICE O/P EST MOD 30 MIN: CPT | Mod: S$GLB,,, | Performed by: STUDENT IN AN ORGANIZED HEALTH CARE EDUCATION/TRAINING PROGRAM

## 2023-07-05 PROCEDURE — 1125F PR PAIN SEVERITY QUANTIFIED, PAIN PRESENT: ICD-10-PCS | Mod: CPTII,S$GLB,, | Performed by: STUDENT IN AN ORGANIZED HEALTH CARE EDUCATION/TRAINING PROGRAM

## 2023-07-05 PROCEDURE — 3079F PR MOST RECENT DIASTOLIC BLOOD PRESSURE 80-89 MM HG: ICD-10-PCS | Mod: CPTII,S$GLB,, | Performed by: STUDENT IN AN ORGANIZED HEALTH CARE EDUCATION/TRAINING PROGRAM

## 2023-07-05 PROCEDURE — 1101F PT FALLS ASSESS-DOCD LE1/YR: CPT | Mod: CPTII,S$GLB,, | Performed by: STUDENT IN AN ORGANIZED HEALTH CARE EDUCATION/TRAINING PROGRAM

## 2023-07-05 PROCEDURE — 1159F PR MEDICATION LIST DOCUMENTED IN MEDICAL RECORD: ICD-10-PCS | Mod: CPTII,S$GLB,, | Performed by: STUDENT IN AN ORGANIZED HEALTH CARE EDUCATION/TRAINING PROGRAM

## 2023-07-05 PROCEDURE — 1160F RVW MEDS BY RX/DR IN RCRD: CPT | Mod: CPTII,S$GLB,, | Performed by: STUDENT IN AN ORGANIZED HEALTH CARE EDUCATION/TRAINING PROGRAM

## 2023-07-05 PROCEDURE — 1159F MED LIST DOCD IN RCRD: CPT | Mod: CPTII,S$GLB,, | Performed by: STUDENT IN AN ORGANIZED HEALTH CARE EDUCATION/TRAINING PROGRAM

## 2023-07-05 PROCEDURE — 1125F AMNT PAIN NOTED PAIN PRSNT: CPT | Mod: CPTII,S$GLB,, | Performed by: STUDENT IN AN ORGANIZED HEALTH CARE EDUCATION/TRAINING PROGRAM

## 2023-07-05 PROCEDURE — 3288F FALL RISK ASSESSMENT DOCD: CPT | Mod: CPTII,S$GLB,, | Performed by: STUDENT IN AN ORGANIZED HEALTH CARE EDUCATION/TRAINING PROGRAM

## 2023-07-05 PROCEDURE — 3075F SYST BP GE 130 - 139MM HG: CPT | Mod: CPTII,S$GLB,, | Performed by: STUDENT IN AN ORGANIZED HEALTH CARE EDUCATION/TRAINING PROGRAM

## 2023-07-05 PROCEDURE — 99214 PR OFFICE/OUTPT VISIT, EST, LEVL IV, 30-39 MIN: ICD-10-PCS | Mod: S$GLB,,, | Performed by: STUDENT IN AN ORGANIZED HEALTH CARE EDUCATION/TRAINING PROGRAM

## 2023-07-05 PROCEDURE — 3288F PR FALLS RISK ASSESSMENT DOCUMENTED: ICD-10-PCS | Mod: CPTII,S$GLB,, | Performed by: STUDENT IN AN ORGANIZED HEALTH CARE EDUCATION/TRAINING PROGRAM

## 2023-07-05 PROCEDURE — 1160F PR REVIEW ALL MEDS BY PRESCRIBER/CLIN PHARMACIST DOCUMENTED: ICD-10-PCS | Mod: CPTII,S$GLB,, | Performed by: STUDENT IN AN ORGANIZED HEALTH CARE EDUCATION/TRAINING PROGRAM

## 2023-07-05 RX ORDER — TRAZODONE HYDROCHLORIDE 50 MG/1
50 TABLET ORAL NIGHTLY
Qty: 30 TABLET | Refills: 11 | Status: SHIPPED | OUTPATIENT
Start: 2023-07-05 | End: 2023-09-19 | Stop reason: SDUPTHER

## 2023-07-05 NOTE — PATIENT INSTRUCTIONS
Magnesium glycinate 200-400 mg nightly before bedtime for sleep    I recommend starting a daily vitamin D3 (cholecalciferol) 2,000 IU supplement. This can be obtained without a prescription at most pharmacies or grocery stores.

## 2023-07-05 NOTE — PROGRESS NOTES
Subjective:       Patient ID: Torie Richard is a 77 y.o. female.    Chief Complaint: Health maintenance examination [Z00.00]    Patient is new to me, here to establish care.    Health maintenance -   Cologuard negative FEB2022.   Denies family history of colorectal cancer.  Mammogram BI-RADS 1 in SEP2022.   Denies history of abnormal mammogram.  Family history of breast cancer.  Denies family history of ovarian cancer.  Completed pap screening.  Denies history of abnormal pap smear.  Family history of cardiac disease.  UTD on Tdap, COVID primary/booster, PPSV23, PCV13, shingles vaccinations.  Due for COVID bivalent vaccinations.  Started smoking at age 22, at most <0.5 PPD. Stopped smoking in 1981.  Drinks alcohol 1-2 times yearly, 1 drinks per sitting.  Denies drug use.  Completed hepatitis C screening.  Due for diabetes screening.  Endorses overall healthy diet.   Not currently exercising routinely.  Endorses active lifestyle.    HTN -   Currently prescribed amlodipine, losartan.  Patient endorses taking medication as directed.  Denies side effects or concerns while taking medication.  Patient not currently checking BP at home.   Due for micro albumin creatinine ratio.   BP Readings from Last 5 Encounters:  07/05/23 : (!) 158/82  03/01/23 : 127/72  02/14/23 : (!) 140/90  01/12/23 : (!) 142/84  12/31/22 : (!) 153/67    HLD -   Endorses taking simvastatin as directed  Denies side effects or concerns while taking medication  Lab Results       Component                Value               Date                       CHOL                     163                 02/14/2023            Lab Results       Component                Value               Date                       LDLCALC                  79.0                02/14/2023            Lab Results       Component                Value               Date                       HDL                      61                  02/14/2023              Osteopenia -   Vitamin D  deficiency -   Completed DEXA in 2020.  Showed osteopenia.  There is a 10.8% risk of a major osteoporotic fracture and a 1.8% risk of hip fracture in the next 10 years (FRAX).  Not currently taking vitamin D supplementation.  Denies family history of osteoporosis.    Taking Tylenol PRN for headaches 2-3 times monthly with good effect    Vitamin B12 deficiency -  Currently taking vitamin B12 500 mcg daily supplementation.  Lab Results       Component                Value               Date                       [id number]               278                 2023            Taking Unisom and melatonin nightly for insomnia with good effect  Endorses memory changes more recently  Concern for chronic antihistamine use  Has trouble falling back asleep if wakes up to urinate  Usually wakes up 1-2 times nightly to urinate  Sometimes with difficulty falling asleep if stressed   STOP BAN, low risk ZOË      Was working in the garden in  and hurt left shoulder   Was lifting heavy pots  Pain overall improved, but still hurts intermittently  Endorses full ROM  Not dropping objects, no weakness   Worse when lying on that side to sleep      Review of Systems   Constitutional:  Negative for activity change, fatigue and fever.   Respiratory:  Negative for cough and shortness of breath.    Cardiovascular:  Negative for chest pain and palpitations.   Gastrointestinal:  Negative for abdominal pain, constipation, diarrhea, nausea and vomiting.   Neurological:  Positive for headaches. Negative for syncope.       Current Outpatient Medications   Medication Instructions    acetaminophen (TYLENOL) 500 mg, Oral, Every 6 hours PRN    amLODIPine (NORVASC) 2.5 mg, Oral, Daily    doxylamine succinate (UNISOM) 12.5 mg, Oral    ibuprofen (ADVIL,MOTRIN) 200 mg, Oral, Every 6 hours PRN    losartan (COZAAR) 100 MG tablet TAKE 1 TABLET BY MOUTH EVERY DAY    melatonin (MELATIN) 5 mg, Oral    naproxen sodium (ANAPROX) 220 mg, Oral, 2  "times daily with meals, Taking Prn    simvastatin (ZOCOR) 20 MG tablet TAKE 1 TABLET BY MOUTH EVERY DAY IN THE EVENING    traZODone (DESYREL) 50 mg, Oral, Nightly     Objective:      Vitals:    07/05/23 0747 07/05/23 0854   BP: (!) 158/82 130/80   Pulse: 80    SpO2: 95%    Weight: 63.9 kg (140 lb 14 oz)    Height: 5' 4" (1.626 m)    PainSc:   4    PainLoc: Shoulder      Body mass index is 24.18 kg/m².    Physical Exam  Vitals reviewed.   Constitutional:       General: She is not in acute distress.     Appearance: Normal appearance. She is not ill-appearing or diaphoretic.   HENT:      Head: Normocephalic and atraumatic.      Right Ear: Tympanic membrane, ear canal and external ear normal. There is no impacted cerumen.      Left Ear: Tympanic membrane, ear canal and external ear normal. There is no impacted cerumen.      Nose: Nose normal. No rhinorrhea.      Mouth/Throat:      Mouth: Mucous membranes are moist.      Pharynx: Oropharynx is clear. No oropharyngeal exudate or posterior oropharyngeal erythema.   Eyes:      General: No scleral icterus.        Right eye: No discharge.         Left eye: No discharge.      Conjunctiva/sclera: Conjunctivae normal.   Neck:      Thyroid: No thyromegaly or thyroid tenderness.      Trachea: Trachea normal.   Cardiovascular:      Rate and Rhythm: Normal rate and regular rhythm.      Heart sounds: Normal heart sounds. No murmur heard.    No friction rub. No gallop.   Pulmonary:      Effort: Pulmonary effort is normal. No respiratory distress.      Breath sounds: Normal breath sounds. No stridor. No wheezing, rhonchi or rales.   Abdominal:      General: There is no distension.      Palpations: Abdomen is soft.      Tenderness: There is no abdominal tenderness. There is no guarding or rebound.   Musculoskeletal:         General: No swelling or deformity.      Cervical back: Neck supple.      Comments: Negative empty can test left.    No pain with external rotation of left arm with " arm at side and elbow flexed to 90 degrees.   Pain with modified lift-off test left side.  No muscle wasting appreciated to left shoulder.   Mild pain with abduction of left arm above 90 degrees.   Negative cross-arm test left.    Shoulder abduction left 5/5 right 5/5  Elbow flexion left 5/5 right 5/5  Elbow extension left 5/5 right 5/5   strength left 5/5 right 5/5     Lymphadenopathy:      Head:      Right side of head: No submandibular or posterior auricular adenopathy.      Left side of head: No submandibular or posterior auricular adenopathy.      Cervical: No cervical adenopathy.      Right cervical: No superficial, deep or posterior cervical adenopathy.     Left cervical: No superficial, deep or posterior cervical adenopathy.      Upper Body:      Right upper body: No supraclavicular adenopathy.      Left upper body: No supraclavicular adenopathy.   Skin:     General: Skin is warm and dry.   Neurological:      General: No focal deficit present.      Mental Status: She is alert. Mental status is at baseline.      Gait: Gait normal.   Psychiatric:         Mood and Affect: Mood normal.         Behavior: Behavior normal.       Assessment:       1. Health maintenance examination    2. Primary hypertension    3. Mixed hyperlipidemia    4. Osteopenia, unspecified location    5. Vitamin D deficiency    6. Chronic nonintractable headache, unspecified headache type    7. Vitamin B12 deficiency    8. Insomnia, unspecified type    9. Chronic left shoulder pain    10. Screening mammogram for breast cancer        Plan:       Primary hypertension  Continue current medications.  RTC in 6 months for follow up.    Mixed hyperlipidemia  Continue current medications.  RTC in 6 months for follow up.    Osteopenia, unspecified location  Vitamin D deficiency  Continue vitamin D supplementation.  Recommend weight bearing exercises for bone strengthening.  [employer] [place] informational handouts provided.   RTC in 6 months  for follow up.  -     DXA Bone Density Axial Skeleton 1 or more sites; Future    Chronic nonintractable headache, unspecified headache type  Continue current medications.  RTC in 6 months for follow up.    Vitamin B12 deficiency  Continue supplementation.  RTC in 6 months for follow up.    Insomnia, unspecified type  Avoid antihistamine use chronically with memory changes  Continue melatonin, may try magnesium supplement  Provided integrative sleep medicine handout  Start trazodone 50 mg PRN nightly 1-2 hours before bedtime  RTC in 6 months for follow up, sooner PRN  -     traZODone (DESYREL) 50 MG tablet; Take 1 tablet (50 mg total) by mouth every evening.    Chronic left shoulder pain  Discussed conservative treatment at home: heat, gentle stretching, NSAIDs PRN  Provided handout with home stretches and exercises. Do not perform stretches/exercises if they cause overt pain.  RTC in pain unimproved or worsening in the next 1-2 weeks.     Health maintenance examination  Reviewed and discussed age appropriate screenings and immunizations.    Screening mammogram for breast cancer  -     Mammo Digital Screening [facility] w/ [facility]; Future        Nerissa Travis MD  7/5/2023

## 2023-07-11 ENCOUNTER — HOSPITAL ENCOUNTER (OUTPATIENT)
Dept: RADIOLOGY | Facility: OTHER | Age: 78
Discharge: HOME | End: 2023-07-11
Attending: STUDENT IN AN ORGANIZED HEALTH CARE EDUCATION/TRAINING PROGRAM
Payer: MEDICARE

## 2023-07-11 DIAGNOSIS — M85.80 OSTEOPENIA, UNSPECIFIED LOCATION: ICD-10-CM

## 2023-07-11 PROCEDURE — 77080 DXA BONE DENSITY AXIAL: CPT | Mod: TC

## 2023-07-11 PROCEDURE — 77080 DXA BONE DENSITY AXIAL SKELETON 1 OR MORE SITES: ICD-10-PCS | Mod: 26,,, | Performed by: RADIOLOGY

## 2023-07-11 PROCEDURE — 77080 DXA BONE DENSITY AXIAL: CPT | Mod: 26,,, | Performed by: RADIOLOGY

## 2023-09-06 ENCOUNTER — TELEPHONE (OUTPATIENT)
Dept: INTERNAL MEDICINE | Facility: CLINIC | Age: 78
End: 2023-09-06
Payer: MEDICARE

## 2023-09-06 VITALS — DIASTOLIC BLOOD PRESSURE: 69 MMHG | SYSTOLIC BLOOD PRESSURE: 121 MMHG

## 2023-09-06 NOTE — TELEPHONE ENCOUNTER
Pt came into the office today and states that the trazodone 50 mg is not working. Pt states that she can not sleep and would like to know if the trazodone dose can be increased. Pt would also like to know if she should change her sleep medication to another med? Would you like to see the pt?

## 2023-09-19 ENCOUNTER — OFFICE VISIT (OUTPATIENT)
Dept: INTERNAL MEDICINE | Facility: CLINIC | Age: 78
End: 2023-09-19
Payer: MEDICARE

## 2023-09-19 VITALS
HEART RATE: 98 BPM | DIASTOLIC BLOOD PRESSURE: 82 MMHG | BODY MASS INDEX: 23.34 KG/M2 | OXYGEN SATURATION: 100 % | SYSTOLIC BLOOD PRESSURE: 124 MMHG | HEIGHT: 64 IN | WEIGHT: 136.69 LBS

## 2023-09-19 DIAGNOSIS — G47.00 INSOMNIA, UNSPECIFIED TYPE: Primary | ICD-10-CM

## 2023-09-19 DIAGNOSIS — I10 ESSENTIAL HYPERTENSION, BENIGN: ICD-10-CM

## 2023-09-19 PROCEDURE — 3074F PR MOST RECENT SYSTOLIC BLOOD PRESSURE < 130 MM HG: ICD-10-PCS | Mod: CPTII,S$GLB,, | Performed by: PHYSICIAN ASSISTANT

## 2023-09-19 PROCEDURE — 3288F FALL RISK ASSESSMENT DOCD: CPT | Mod: CPTII,S$GLB,, | Performed by: PHYSICIAN ASSISTANT

## 2023-09-19 PROCEDURE — 3079F PR MOST RECENT DIASTOLIC BLOOD PRESSURE 80-89 MM HG: ICD-10-PCS | Mod: CPTII,S$GLB,, | Performed by: PHYSICIAN ASSISTANT

## 2023-09-19 PROCEDURE — 1101F PR PT FALLS ASSESS DOC 0-1 FALLS W/OUT INJ PAST YR: ICD-10-PCS | Mod: CPTII,S$GLB,, | Performed by: PHYSICIAN ASSISTANT

## 2023-09-19 PROCEDURE — 1101F PT FALLS ASSESS-DOCD LE1/YR: CPT | Mod: CPTII,S$GLB,, | Performed by: PHYSICIAN ASSISTANT

## 2023-09-19 PROCEDURE — 99999 PR PBB SHADOW E&M-EST. PATIENT-LVL III: CPT | Mod: PBBFAC,,, | Performed by: PHYSICIAN ASSISTANT

## 2023-09-19 PROCEDURE — 99214 PR OFFICE/OUTPT VISIT, EST, LEVL IV, 30-39 MIN: ICD-10-PCS | Mod: S$GLB,,, | Performed by: PHYSICIAN ASSISTANT

## 2023-09-19 PROCEDURE — 1126F PR PAIN SEVERITY QUANTIFIED, NO PAIN PRESENT: ICD-10-PCS | Mod: CPTII,S$GLB,, | Performed by: PHYSICIAN ASSISTANT

## 2023-09-19 PROCEDURE — 3074F SYST BP LT 130 MM HG: CPT | Mod: CPTII,S$GLB,, | Performed by: PHYSICIAN ASSISTANT

## 2023-09-19 PROCEDURE — 3079F DIAST BP 80-89 MM HG: CPT | Mod: CPTII,S$GLB,, | Performed by: PHYSICIAN ASSISTANT

## 2023-09-19 PROCEDURE — 99999 PR PBB SHADOW E&M-EST. PATIENT-LVL III: ICD-10-PCS | Mod: PBBFAC,,, | Performed by: PHYSICIAN ASSISTANT

## 2023-09-19 PROCEDURE — 1126F AMNT PAIN NOTED NONE PRSNT: CPT | Mod: CPTII,S$GLB,, | Performed by: PHYSICIAN ASSISTANT

## 2023-09-19 PROCEDURE — 99214 OFFICE O/P EST MOD 30 MIN: CPT | Mod: S$GLB,,, | Performed by: PHYSICIAN ASSISTANT

## 2023-09-19 PROCEDURE — 3288F PR FALLS RISK ASSESSMENT DOCUMENTED: ICD-10-PCS | Mod: CPTII,S$GLB,, | Performed by: PHYSICIAN ASSISTANT

## 2023-09-19 RX ORDER — TRAZODONE HYDROCHLORIDE 50 MG/1
50 TABLET ORAL NIGHTLY
Qty: 30 TABLET | Refills: 11 | Status: SHIPPED | OUTPATIENT
Start: 2023-09-19 | End: 2024-09-18

## 2023-09-19 RX ORDER — VITS A,C,E/LUTEIN/MINERALS 300MCG-200
200 TABLET ORAL NIGHTLY
Qty: 30 TABLET | Refills: 11 | Status: SHIPPED | OUTPATIENT
Start: 2023-09-19

## 2023-09-19 NOTE — PROGRESS NOTES
INTERNAL MEDICINE URGENT VISIT NOTE    CHIEF COMPLAINT     Chief Complaint   Patient presents with    Hypertension       HPI     Torie Richard is a 78 y.o. female who presents for an urgent visit today.    PCP is Nerissa Travis MD, patient is new to me.     Patient presents today with complaints of trouble with sleeping and poor side effects from prescribed trazodone. She was last seen y PCP on 7/5/2023 which is when trazodone was prescribed.   Causing daytime drowsiness with headaches and dizziness.   Taking Melatonin - had good results with OTC antihistamines but this was discontinued because of memory trouble. Will avoid antihistamines but will add Mg to regimen along with continuing melatonin.   Will decrease trazodone to 25mg and add Mg 200 nightly.   Will RTC in about 4 weeks for symptom re-check     BP is well controlled on home log and here in office       Past Medical History:  Past Medical History:   Diagnosis Date    Hyperlipidemia     Hypertension        Home Medications:  Prior to Admission medications    Medication Sig Start Date End Date Taking? Authorizing Provider   amLODIPine (NORVASC) 2.5 MG tablet Take 1 tablet (2.5 mg total) by mouth once daily. 2/14/23 2/14/24 Yes Bartolo Burciaga MD   doxylamine succinate (UNISOM) tablet Take 12.5 mg by mouth.   Yes Provider, Historical   ibuprofen (ADVIL,MOTRIN) 200 MG tablet Take 200 mg by mouth every 6 (six) hours as needed for Pain.   Yes Provider, Historical   losartan (COZAAR) 100 MG tablet TAKE 1 TABLET BY MOUTH EVERY DAY 3/27/23  Yes Bartolo Burciaga MD   melatonin 5 mg Tab Take 5 mg by mouth.   Yes Provider, Historical   naproxen sodium (ANAPROX) 220 MG tablet Take 220 mg by mouth 2 (two) times daily with meals. Taking Prn   Yes Provider, Historical   simvastatin (ZOCOR) 20 MG tablet TAKE 1 TABLET BY MOUTH EVERY DAY IN THE EVENING 4/19/23  Yes Bartolo Burciaga MD   acetaminophen (TYLENOL) 500 MG tablet Take 500 mg by mouth every 6  "(six) hours as needed for Pain.    Provider, Historical   traZODone (DESYREL) 50 MG tablet Take 1 tablet (50 mg total) by mouth every evening.  Patient not taking: Reported on 9/19/2023 7/5/23 7/4/24  Nerissa Travis MD       Review of Systems:  Review of Systems   Constitutional:  Negative for chills and fever.   HENT:  Negative for sore throat and trouble swallowing.    Eyes:  Negative for visual disturbance.   Respiratory:  Negative for cough and shortness of breath.    Cardiovascular:  Negative for chest pain.   Gastrointestinal:  Negative for abdominal pain, constipation, diarrhea, nausea and vomiting.   Genitourinary:  Negative for dysuria and flank pain.   Musculoskeletal:  Negative for back pain, neck pain and neck stiffness.   Skin:  Negative for rash.   Neurological:  Negative for dizziness, syncope, weakness and headaches.   Psychiatric/Behavioral:  Negative for confusion.        Health Maintainence:   Immunizations:  Health Maintenance         Date Due Completion Date    Influenza Vaccine (1) 09/01/2023 10/21/2022    Mammogram 09/06/2023 9/6/2022    COVID-19 Vaccine (5 - Pfizer series) 01/12/2024 (Originally 6/29/2022) 5/4/2022    DEXA Scan 07/11/2026 7/11/2023    Lipid Panel 02/14/2028 2/14/2023    TETANUS VACCINE 03/21/2028 3/21/2018             PHYSICAL EXAM     /82   Pulse 98   Ht 5' 4" (1.626 m)   Wt 62 kg (136 lb 11 oz)   SpO2 100%   BMI 23.46 kg/m²     Physical Exam  Vitals and nursing note reviewed.   Constitutional:       Appearance: Normal appearance.      Comments: Healthy appearing female in NAD or apparent pain. She makes good eye contact, speaks in clear full sentences and ambulates with ease.        HENT:      Head: Normocephalic and atraumatic.      Nose: Nose normal.      Mouth/Throat:      Pharynx: Oropharynx is clear.   Eyes:      Conjunctiva/sclera: Conjunctivae normal.   Cardiovascular:      Rate and Rhythm: Normal rate and regular rhythm.      Pulses: Normal pulses. " "  Pulmonary:      Effort: No respiratory distress.   Abdominal:      Tenderness: There is no abdominal tenderness.   Musculoskeletal:         General: Normal range of motion.      Cervical back: No rigidity.   Skin:     General: Skin is warm and dry.      Capillary Refill: Capillary refill takes less than 2 seconds.      Findings: No rash.   Neurological:      General: No focal deficit present.      Mental Status: She is alert.      Gait: Gait normal.   Psychiatric:         Mood and Affect: Mood normal.         LABS     No results found for: "LABA1C", "HGBA1C"  CMP  Sodium   Date Value Ref Range Status   02/14/2023 141 136 - 145 mmol/L Final     Potassium   Date Value Ref Range Status   02/14/2023 4.2 3.5 - 5.1 mmol/L Final     Chloride   Date Value Ref Range Status   02/14/2023 108 95 - 110 mmol/L Final     CO2   Date Value Ref Range Status   02/14/2023 26 23 - 29 mmol/L Final     Glucose   Date Value Ref Range Status   02/14/2023 94 70 - 110 mg/dL Final     BUN   Date Value Ref Range Status   02/14/2023 17 8 - 23 mg/dL Final     Creatinine   Date Value Ref Range Status   02/14/2023 0.8 0.5 - 1.4 mg/dL Final     Calcium   Date Value Ref Range Status   02/14/2023 9.8 8.7 - 10.5 mg/dL Final     Total Protein   Date Value Ref Range Status   02/14/2023 7.2 6.0 - 8.4 g/dL Final     Albumin   Date Value Ref Range Status   02/14/2023 4.3 3.5 - 5.2 g/dL Final     Total Bilirubin   Date Value Ref Range Status   02/14/2023 0.3 0.1 - 1.0 mg/dL Final     Comment:     For infants and newborns, interpretation of results should be based  on gestational age, weight and in agreement with clinical  observations.    Premature Infant recommended reference ranges:  Up to 24 hours.............<8.0 mg/dL  Up to 48 hours............<12.0 mg/dL  3-5 days..................<15.0 mg/dL  6-29 days.................<15.0 mg/dL       Alkaline Phosphatase   Date Value Ref Range Status   02/14/2023 67 55 - 135 U/L Final     AST   Date Value Ref " Range Status   02/14/2023 17 10 - 40 U/L Final     ALT   Date Value Ref Range Status   02/14/2023 15 10 - 44 U/L Final     Anion Gap   Date Value Ref Range Status   02/14/2023 7 (L) 8 - 16 mmol/L Final     eGFR if    Date Value Ref Range Status   02/24/2022 >60 >60 mL/min/1.73 m^2 Final     eGFR if non    Date Value Ref Range Status   02/24/2022 >60 >60 mL/min/1.73 m^2 Final     Comment:     Calculation used to obtain the estimated glomerular filtration  rate (eGFR) is the CKD-EPI equation.        Lab Results   Component Value Date    WBC 5.26 02/02/2023    HGB 14.1 02/02/2023    HCT 42.6 02/02/2023    MCV 94 02/02/2023     02/02/2023     Lab Results   Component Value Date    CHOL 163 02/14/2023    CHOL 154 02/02/2023    CHOL 154 01/26/2022     Lab Results   Component Value Date    HDL 61 02/14/2023    HDL 56 02/02/2023    HDL 61 01/26/2022     Lab Results   Component Value Date    LDLCALC 79.0 02/14/2023    LDLCALC 80.6 02/02/2023    LDLCALC 75.6 01/26/2022     Lab Results   Component Value Date    TRIG 115 02/14/2023    TRIG 87 02/02/2023    TRIG 87 01/26/2022     Lab Results   Component Value Date    CHOLHDL 37.4 02/14/2023    CHOLHDL 36.4 02/02/2023    CHOLHDL 39.6 01/26/2022     Lab Results   Component Value Date    TSH 1.137 02/14/2023       ASSESSMENT/PLAN     Torie Richard is a 78 y.o. female     Torie was seen today for hypertension.    Diagnoses and all orders for this visit:    Insomnia, unspecified type   Continue melatonin, add Mg as below   Decrease trazodone dose and only take PRN  -     traZODone (DESYREL) 50 MG tablet; Take 1 tablet (50 mg total) by mouth every evening.    Essential hypertension, benign   -continue current regimen - BP is well controlled    Other orders  -     magnesium oxide 200 mg magnesium Tab; Take 200 mg by mouth every evening.     Patient was counseled on when and how to seek emergent care.       Brittany Martini PA-C   Department  of Internal Medicine - [facility]   9:06 AM

## 2023-09-29 ENCOUNTER — HOSPITAL ENCOUNTER (OUTPATIENT)
Dept: RADIOLOGY | Facility: OTHER | Age: 78
Discharge: HOME | End: 2023-09-29
Attending: STUDENT IN AN ORGANIZED HEALTH CARE EDUCATION/TRAINING PROGRAM
Payer: MEDICARE

## 2023-09-29 DIAGNOSIS — Z12.31 SCREENING MAMMOGRAM FOR BREAST CANCER: ICD-10-CM

## 2023-09-29 PROCEDURE — 77067 SCR MAMMO BI INCL CAD: CPT | Mod: TC

## 2023-09-29 PROCEDURE — 77063 BREAST TOMOSYNTHESIS BI: CPT | Mod: 26,,, | Performed by: RADIOLOGY

## 2023-09-29 PROCEDURE — 77067 SCR MAMMO BI INCL CAD: CPT | Mod: 26,,, | Performed by: RADIOLOGY

## 2023-09-29 PROCEDURE — 77067 MAMMO DIGITAL SCREENING BILAT WITH TOMO: ICD-10-PCS | Mod: 26,,, | Performed by: RADIOLOGY

## 2023-09-29 PROCEDURE — 77063 MAMMO DIGITAL SCREENING BILAT WITH TOMO: ICD-10-PCS | Mod: 26,,, | Performed by: RADIOLOGY

## 2023-12-31 ENCOUNTER — OFFICE VISIT (OUTPATIENT)
Dept: URGENT CARE | Facility: CLINIC | Age: 78
End: 2023-12-31
Payer: MEDICARE

## 2023-12-31 ENCOUNTER — NURSE TRIAGE (OUTPATIENT)
Dept: ADMINISTRATIVE | Facility: CLINIC | Age: 78
End: 2023-12-31
Payer: MEDICARE

## 2023-12-31 VITALS
HEIGHT: 64 IN | SYSTOLIC BLOOD PRESSURE: 131 MMHG | DIASTOLIC BLOOD PRESSURE: 75 MMHG | TEMPERATURE: 98 F | BODY MASS INDEX: 23.22 KG/M2 | HEART RATE: 72 BPM | WEIGHT: 136 LBS | OXYGEN SATURATION: 95 % | RESPIRATION RATE: 16 BRPM

## 2023-12-31 DIAGNOSIS — B37.31 YEAST INFECTION INVOLVING THE VAGINA AND SURROUNDING AREA: Primary | ICD-10-CM

## 2023-12-31 PROCEDURE — 99203 PR OFFICE/OUTPT VISIT, NEW, LEVL III, 30-44 MIN: ICD-10-PCS | Mod: S$GLB,,, | Performed by: NURSE PRACTITIONER

## 2023-12-31 PROCEDURE — 99203 OFFICE O/P NEW LOW 30 MIN: CPT | Mod: S$GLB,,, | Performed by: NURSE PRACTITIONER

## 2023-12-31 RX ORDER — FLUCONAZOLE 150 MG/1
150 TABLET ORAL
Qty: 2 TABLET | Refills: 0 | Status: SHIPPED | OUTPATIENT
Start: 2023-12-31 | End: 2024-01-04
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
## 2023-12-31 NOTE — PROGRESS NOTES
"Subjective:      Patient ID: Torie Richard is a 78 y.o. female.    Vitals:  height is 5' 4" (1.626 m) and weight is 61.7 kg (136 lb). Her oral temperature is 97.6 °F (36.4 °C). Her blood pressure is 131/75 and her pulse is 72. Her respiration is 16 and oxygen saturation is 95%.     Chief Complaint: Vaginal Itching      Pt is a 77 yo female presenting with vaginal itchiness.  Onset of symptoms was 2 days ago.  Pt reports using OTC external antifungal topical cream, external metronidazole topical cream.  Pt denies dysuria, frequency, urgency, and vaginal discharge.      Vaginal Itching  The patient's pertinent negatives include no genital lesions, genital odor, genital rash, missed menses, pelvic pain, vaginal bleeding or vaginal discharge. This is a new problem. The current episode started in the past 7 days. The problem occurs constantly. The problem has been unchanged. The patient is experiencing no pain. The problem affects both sides. She is not pregnant. Pertinent negatives include no abdominal pain, anorexia, back pain, chills, constipation, diarrhea, discolored urine, dysuria, fever, flank pain, frequency, headaches, hematuria, joint pain, joint swelling, nausea, painful intercourse, rash, sore throat, urgency or vomiting. Nothing aggravates the symptoms. She has tried antifungals for the symptoms. The treatment provided no relief. She is not sexually active. It is unknown whether or not her partner has an STD. She uses nothing for contraception.     Constitution: Negative for chills and fever.   HENT:  Negative for sore throat.    Gastrointestinal:  Negative for abdominal pain, nausea, vomiting, constipation and diarrhea.   Genitourinary:  Negative for dysuria, frequency, urgency, flank pain, hematuria, missed menses, vaginal discharge and pelvic pain.        External vaginal itching only   Musculoskeletal:  Negative for back pain.   Skin:  Negative for rash.   Neurological:  Negative for headaches.    "   Objective:     Physical Exam   Constitutional: She is oriented to person, place, and time.   Cardiovascular: Normal rate and regular rhythm.   Pulmonary/Chest: Effort normal and breath sounds normal.   Abdominal: Bowel sounds are normal. Soft. flat abdomen There is no abdominal tenderness. There is no left CVA tenderness and no right CVA tenderness.   Genitourinary:    Pelvic exam was performed with patient in the lithotomy position.   There is no rash, tenderness, lesion or injury on the right labia. There is no rash, tenderness or lesion on the left labia.    No vaginal discharge, erythema, tenderness or bleeding.   No erythema, tenderness or bleeding in the vagina.    No lesions in the vagina.           Comments: Bilateral labia and surrounding areas has diffuse erythema and edema.  No papular rash present.  No vaginal discharge      Neurological: She is alert and oriented to person, place, and time.   Skin: Skin is warm and dry.   Vitals reviewed.      Assessment:     1. Yeast infection involving the vagina and surrounding area        Plan:       Yeast infection involving the vagina and surrounding area  -     fluconazole (DIFLUCAN) 150 MG Tab; Take 1 tablet (150 mg total) by mouth Every 3 (three) days. for 2 doses  Dispense: 2 tablet; Refill: 0      Patient Instructions     Yeast infection involving the vagina and surrounding area    -     fluconazole (DIFLUCAN) 150 MG Tab; Take 1 tablet (150 mg total) by mouth Every 3 (three) days. for 2 doses  Dispense: 2 tablet; Refill: 0       Try taking an over the counter probiotic or eating yogurt.      Follow up with GYN or PCP if symptoms worsen or do not resolve fully.     ED Precautions   If your symptoms worsen or fail to improve you should go to Emergency Department.

## 2023-12-31 NOTE — PATIENT INSTRUCTIONS
Yeast infection involving the vagina and surrounding area    -     fluconazole (DIFLUCAN) 150 MG Tab; Take 1 tablet (150 mg total) by mouth Every 3 (three) days. for 2 doses  Dispense: 2 tablet; Refill: 0       Try taking an over the counter probiotic or eating yogurt.      Follow up with GYN or PCP if symptoms worsen or do not resolve fully.     ED Precautions   If your symptoms worsen or fail to improve you should go to Emergency Department.

## 2024-01-08 ENCOUNTER — LAB VISIT (OUTPATIENT)
Dept: LAB | Facility: OTHER | Age: 79
End: 2024-01-08
Attending: STUDENT IN AN ORGANIZED HEALTH CARE EDUCATION/TRAINING PROGRAM
Payer: MEDICARE

## 2024-01-08 ENCOUNTER — OFFICE VISIT (OUTPATIENT)
Dept: INTERNAL MEDICINE | Facility: CLINIC | Age: 79
End: 2024-01-08
Payer: MEDICARE

## 2024-01-08 VITALS
DIASTOLIC BLOOD PRESSURE: 72 MMHG | BODY MASS INDEX: 22.97 KG/M2 | SYSTOLIC BLOOD PRESSURE: 144 MMHG | HEART RATE: 66 BPM | WEIGHT: 133.81 LBS | OXYGEN SATURATION: 96 %

## 2024-01-08 DIAGNOSIS — E53.8 VITAMIN B12 DEFICIENCY: ICD-10-CM

## 2024-01-08 DIAGNOSIS — G47.00 INSOMNIA, UNSPECIFIED TYPE: ICD-10-CM

## 2024-01-08 DIAGNOSIS — Z23 NEED FOR VACCINATION: ICD-10-CM

## 2024-01-08 DIAGNOSIS — Z00.00 HEALTH MAINTENANCE EXAMINATION: Primary | ICD-10-CM

## 2024-01-08 DIAGNOSIS — I10 PRIMARY HYPERTENSION: ICD-10-CM

## 2024-01-08 DIAGNOSIS — E55.9 VITAMIN D DEFICIENCY: ICD-10-CM

## 2024-01-08 DIAGNOSIS — R73.09 OTHER ABNORMAL GLUCOSE: ICD-10-CM

## 2024-01-08 DIAGNOSIS — E78.2 MIXED HYPERLIPIDEMIA: ICD-10-CM

## 2024-01-08 DIAGNOSIS — B37.9 YEAST INFECTION: ICD-10-CM

## 2024-01-08 DIAGNOSIS — Z00.00 HEALTH MAINTENANCE EXAMINATION: ICD-10-CM

## 2024-01-08 DIAGNOSIS — L30.4 INTERTRIGO: ICD-10-CM

## 2024-01-08 DIAGNOSIS — M85.89 OSTEOPENIA OF MULTIPLE SITES: ICD-10-CM

## 2024-01-08 LAB
25(OH)D3+25(OH)D2 SERPL-MCNC: 33 NG/ML (ref 30–96)
ALBUMIN SERPL BCP-MCNC: 4.2 G/DL (ref 3.5–5.2)
ALP SERPL-CCNC: 68 U/L (ref 55–135)
ALT SERPL W/O P-5'-P-CCNC: 9 U/L (ref 10–44)
ANION GAP SERPL CALC-SCNC: 10 MMOL/L (ref 8–16)
AST SERPL-CCNC: 15 U/L (ref 10–40)
BASOPHILS # BLD AUTO: 0.04 K/UL (ref 0–0.2)
BASOPHILS NFR BLD: 0.6 % (ref 0–1.9)
BILIRUB SERPL-MCNC: 0.4 MG/DL (ref 0.1–1)
BUN SERPL-MCNC: 14 MG/DL (ref 8–23)
CALCIUM SERPL-MCNC: 9.6 MG/DL (ref 8.7–10.5)
CHLORIDE SERPL-SCNC: 107 MMOL/L (ref 95–110)
CHOLEST SERPL-MCNC: 164 MG/DL (ref 120–199)
CHOLEST/HDLC SERPL: 3 {RATIO} (ref 2–5)
CO2 SERPL-SCNC: 25 MMOL/L (ref 23–29)
CREAT SERPL-MCNC: 0.8 MG/DL (ref 0.5–1.4)
DIFFERENTIAL METHOD BLD: ABNORMAL
EOSINOPHIL # BLD AUTO: 0.5 K/UL (ref 0–0.5)
EOSINOPHIL NFR BLD: 8.5 % (ref 0–8)
ERYTHROCYTE [DISTWIDTH] IN BLOOD BY AUTOMATED COUNT: 12.3 % (ref 11.5–14.5)
EST. GFR  (NO RACE VARIABLE): >60 ML/MIN/1.73 M^2
ESTIMATED AVG GLUCOSE: 105 MG/DL (ref 68–131)
GLUCOSE SERPL-MCNC: 97 MG/DL (ref 70–110)
HBA1C MFR BLD: 5.3 % (ref 4–5.6)
HCT VFR BLD AUTO: 41.8 % (ref 37–48.5)
HDLC SERPL-MCNC: 55 MG/DL (ref 40–75)
HDLC SERPL: 33.5 % (ref 20–50)
HGB BLD-MCNC: 13.9 G/DL (ref 12–16)
IMM GRANULOCYTES # BLD AUTO: 0.01 K/UL (ref 0–0.04)
IMM GRANULOCYTES NFR BLD AUTO: 0.2 % (ref 0–0.5)
LDLC SERPL CALC-MCNC: 84.8 MG/DL (ref 63–159)
LYMPHOCYTES # BLD AUTO: 1.9 K/UL (ref 1–4.8)
LYMPHOCYTES NFR BLD: 29.5 % (ref 18–48)
MCH RBC QN AUTO: 31.1 PG (ref 27–31)
MCHC RBC AUTO-ENTMCNC: 33.3 G/DL (ref 32–36)
MCV RBC AUTO: 94 FL (ref 82–98)
MONOCYTES # BLD AUTO: 0.4 K/UL (ref 0.3–1)
MONOCYTES NFR BLD: 6.2 % (ref 4–15)
NEUTROPHILS # BLD AUTO: 3.5 K/UL (ref 1.8–7.7)
NEUTROPHILS NFR BLD: 55 % (ref 38–73)
NONHDLC SERPL-MCNC: 109 MG/DL
NRBC BLD-RTO: 0 /100 WBC
PLATELET # BLD AUTO: 242 K/UL (ref 150–450)
PMV BLD AUTO: 9.7 FL (ref 9.2–12.9)
POTASSIUM SERPL-SCNC: 4.4 MMOL/L (ref 3.5–5.1)
PROT SERPL-MCNC: 6.9 G/DL (ref 6–8.4)
RBC # BLD AUTO: 4.47 M/UL (ref 4–5.4)
SODIUM SERPL-SCNC: 142 MMOL/L (ref 136–145)
TRIGL SERPL-MCNC: 121 MG/DL (ref 30–150)
TSH SERPL DL<=0.005 MIU/L-ACNC: 1.09 UIU/ML (ref 0.4–4)
VIT B12 SERPL-MCNC: 1274 PG/ML (ref 210–950)
WBC # BLD AUTO: 6.34 K/UL (ref 3.9–12.7)

## 2024-01-08 PROCEDURE — 99214 OFFICE O/P EST MOD 30 MIN: CPT | Mod: S$GLB,,, | Performed by: STUDENT IN AN ORGANIZED HEALTH CARE EDUCATION/TRAINING PROGRAM

## 2024-01-08 PROCEDURE — 82306 VITAMIN D 25 HYDROXY: CPT | Performed by: STUDENT IN AN ORGANIZED HEALTH CARE EDUCATION/TRAINING PROGRAM

## 2024-01-08 PROCEDURE — 83036 HEMOGLOBIN GLYCOSYLATED A1C: CPT | Performed by: STUDENT IN AN ORGANIZED HEALTH CARE EDUCATION/TRAINING PROGRAM

## 2024-01-08 PROCEDURE — 36415 COLL VENOUS BLD VENIPUNCTURE: CPT | Performed by: STUDENT IN AN ORGANIZED HEALTH CARE EDUCATION/TRAINING PROGRAM

## 2024-01-08 PROCEDURE — 82043 UR ALBUMIN QUANTITATIVE: CPT | Performed by: STUDENT IN AN ORGANIZED HEALTH CARE EDUCATION/TRAINING PROGRAM

## 2024-01-08 PROCEDURE — 85025 COMPLETE CBC W/AUTO DIFF WBC: CPT | Performed by: STUDENT IN AN ORGANIZED HEALTH CARE EDUCATION/TRAINING PROGRAM

## 2024-01-08 PROCEDURE — 82607 VITAMIN B-12: CPT | Performed by: STUDENT IN AN ORGANIZED HEALTH CARE EDUCATION/TRAINING PROGRAM

## 2024-01-08 PROCEDURE — 80053 COMPREHEN METABOLIC PANEL: CPT | Performed by: STUDENT IN AN ORGANIZED HEALTH CARE EDUCATION/TRAINING PROGRAM

## 2024-01-08 PROCEDURE — 84443 ASSAY THYROID STIM HORMONE: CPT | Performed by: STUDENT IN AN ORGANIZED HEALTH CARE EDUCATION/TRAINING PROGRAM

## 2024-01-08 PROCEDURE — 80061 LIPID PANEL: CPT | Performed by: STUDENT IN AN ORGANIZED HEALTH CARE EDUCATION/TRAINING PROGRAM

## 2024-01-08 PROCEDURE — 99999 PR PBB SHADOW E&M-EST. PATIENT-LVL III: CPT | Mod: PBBFAC,,, | Performed by: STUDENT IN AN ORGANIZED HEALTH CARE EDUCATION/TRAINING PROGRAM

## 2024-01-08 RX ORDER — NYSTATIN 100000 [USP'U]/G
POWDER TOPICAL 4 TIMES DAILY
Qty: 30 G | Refills: 1 | Status: SHIPPED | OUTPATIENT
Start: 2024-01-08

## 2024-01-08 RX ORDER — FLUCONAZOLE 150 MG/1
150 TABLET ORAL EVERY OTHER DAY
Qty: 3 TABLET | Refills: 0 | Status: SHIPPED | OUTPATIENT
Start: 2024-01-08 | End: 2024-01-13

## 2024-01-08 RX ORDER — CLOTRIMAZOLE AND BETAMETHASONE DIPROPIONATE 10; .64 MG/G; MG/G
CREAM TOPICAL 2 TIMES DAILY
Qty: 45 G | Refills: 0 | Status: SHIPPED | OUTPATIENT
Start: 2024-01-08

## 2024-01-08 NOTE — PROGRESS NOTES
Subjective:       Patient ID: Torie Richard is a 78 y.o. female.    Chief Complaint: Health maintenance examination [Z00.00]    Patient is established with me, here today for the following:    HTN, HLD, osteopenia, vitamin D deficiency, insomnia    Health maintenance -   Cologuard negative FEB2022.   Denies family history of colorectal cancer.  Mammogram BI-RADS 1 in SEP2023.   Denies history of abnormal mammogram.  Family history of breast cancer.  Denies family history of ovarian cancer.  Completed pap screening.  Denies history of abnormal pap smear.  Family history of cardiac disease.  UTD on Tdap, COVID primary/booster, PPSV23, PCV13, shingles, influenza vaccinations.  Due for COVID, RSV vaccinations.  Started smoking at age 22, at most <0.5 PPD. Stopped smoking in 1981.  Drinks alcohol 1-2 times yearly, 1 drinks per sitting.  Denies drug use.  Completed hepatitis C screening.  Due for diabetes screening.  Endorses active lifestyle.  Endorses overall healthy diet.      HTN -   Currently prescribed amlodipine, losartan.  Patient endorses taking medication as directed.  Denies side effects or concerns while taking medication.  Patient not currently checking BP at home.   Due for micro albumin creatinine ratio.   BP Readings from Last 5 Encounters:  12/31/23 : 131/75  09/19/23 : 124/82  09/06/23 : 121/69  08/07/23 : 119/70  07/05/23 : 130/80     HLD -   Endorses taking simvastatin as directed  Denies side effects or concerns while taking medication  Lab Results       Component                Value               Date                       CHOL                     163                 02/14/2023            Lab Results       Component                Value               Date                       TRIG                     115                 02/14/2023            Lab Results       Component                Value               Date                       LDLCALC                  79.0                02/14/2023           "  Lab Results       Component                Value               Date                       HDL                      61                  02/14/2023            Due for lipid recheck.           Osteopenia -   Vitamin D deficiency -   Completed DEXA in JULY2023.  Showed osteopenia.  "Ten year fracture probability:  Major osteoporotic 10.9%, hip 2.1%."  Denies family history of osteoporosis.  Currently taking vitamin D3 supplementation daily.       Vitamin B12 deficiency -  Currently taking vitamin B12 500 mcg daily supplementation.  Lab Results       Component                Value               Date                       [id number]               278                 02/14/2023                   Trazodone with grogginess and headaches, decreased dose in SEP2023  Decreased dose to 25 mg nightly and started magnesium 200 mg nightly  Working well at current dose      Began with vaginal pruritus on 29DEC  Went to  31DEC for problem   Treated with two doses diflucan without significant improvement  Endorses pruritus wakes her at night          Review of Systems   Constitutional:  Negative for activity change, fatigue and fever.   Respiratory:  Negative for cough and shortness of breath.    Cardiovascular:  Negative for chest pain and palpitations.   Gastrointestinal:  Negative for abdominal pain, constipation, diarrhea, nausea and vomiting.   Genitourinary:  Positive for vaginal discharge.   Skin:  Positive for rash.   Neurological:  Negative for syncope and headaches.         Current Outpatient Medications   Medication Instructions    acetaminophen (TYLENOL) 500 mg, Oral, Every 6 hours PRN    amLODIPine (NORVASC) 2.5 mg, Oral, Daily    clotrimazole-betamethasone 1-0.05% (LOTRISONE) cream Topical (Top), 2 times daily    doxylamine succinate (UNISOM) 12.5 mg, Oral    fluconazole (DIFLUCAN) 150 mg, Oral, Every other day    ibuprofen (ADVIL,MOTRIN) 200 mg, Oral, Every 6 hours PRN    losartan (COZAAR) 100 MG tablet TAKE 1 " TABLET BY MOUTH EVERY DAY    magnesium oxide 200 mg, Oral, Nightly    melatonin (MELATIN) 5 mg, Oral    naproxen sodium (ANAPROX) 220 mg, Oral, 2 times daily with meals, Taking Prn    nystatin (MYCOSTATIN) powder Topical (Top), 4 times daily    simvastatin (ZOCOR) 20 MG tablet TAKE 1 TABLET BY MOUTH EVERY DAY IN THE EVENING    traZODone (DESYREL) 50 mg, Oral, Nightly     Objective:      Vitals:    01/08/24 0845 01/08/24 0928   BP: (!) 163/74 (!) 144/72   Pulse: 66    SpO2: 96%    Weight: 60.7 kg (133 lb 13.1 oz)    PainSc: 0-No pain      Body mass index is 22.97 kg/m².    Physical Exam  Vitals reviewed.   Constitutional:       General: She is not in acute distress.     Appearance: She is not ill-appearing or diaphoretic.   Cardiovascular:      Rate and Rhythm: Normal rate and regular rhythm.      Heart sounds: Normal heart sounds. No murmur heard.     No friction rub. No gallop.   Pulmonary:      Effort: Pulmonary effort is normal. No respiratory distress.      Breath sounds: Normal breath sounds. No stridor. No wheezing, rhonchi or rales.   Skin:     General: Skin is warm and dry.      Comments: Erythematous patches with satellite lesions to intertriginous regions of bilateral groin, suprapubic region, and intergluteal cleft. No scaling, discharge, edema, or crusting.    Neurological:      Mental Status: She is alert. Mental status is at baseline.   Psychiatric:         Mood and Affect: Mood normal.         Behavior: Behavior normal.         Assessment:       1. Health maintenance examination    2. Primary hypertension    3. Mixed hyperlipidemia    4. Osteopenia of multiple sites    5. Vitamin D deficiency    6. Vitamin B12 deficiency    7. Insomnia, unspecified type    8. Intertrigo    9. Yeast infection    10. Need for vaccination    11. Other abnormal glucose        Plan:       Primary hypertension  Continue current medications.  RTC in 6 months for follow up.  -     Comprehensive Metabolic Panel; Future  -      TSH; Future  -     CBC Auto Differential; Future    Mixed hyperlipidemia  Continue current medications.  RTC in 6 months for follow up.  -     Lipid Panel; Future    Osteopenia of multiple sites  Vitamin D deficiency  Continue supplementation.  RTC in 6 months for follow up.  -     Vitamin D; Future    Vitamin B12 deficiency  Continue supplementation.  RTC in 6 months for follow up.  -     Vitamin B12; Future    Insomnia, unspecified type  Continue current medications.  RTC in 6 months for follow up.    Intertrigo  RTC PRN worsening or unimproved over the next week.  -     clotrimazole-betamethasone 1-0.05% (LOTRISONE) cream; Apply topically 2 (two) times daily.  -     nystatin (MYCOSTATIN) powder; Apply topically 4 (four) times daily.    Yeast infection  -     fluconazole (DIFLUCAN) 150 MG Tab; Take 1 tablet (150 mg total) by mouth every other day. for 3 doses    Health maintenance examination  Reviewed and discussed age appropriate screenings and immunizations.  -     Comprehensive Metabolic Panel; Future  -     TSH; Future  -     Lipid Panel; Future  -     Hemoglobin A1C; Future  -     CBC Auto Differential; Future  -     Microalbumin/Creatinine Ratio, Urine; Future  -     Vitamin B12; Future  -     Vitamin D; Future    Need for vaccination  Discussed recommended vaccinations and how to obtain.    Other abnormal glucose  -     Hemoglobin A1C; Future        Nerissa Travis MD  1/8/2024

## 2024-01-08 NOTE — PATIENT INSTRUCTIONS
- Keep skin cool and dry. Use a fan or air conditioning as needed. Try to expose affected skin to air twice a day for about 30 minutes each time.  - Do not wear tight shoes or clothing. Wear a bra that has good support.  - Wear clothes made with absorbent fabrics, but do not wear nylon or other manmade fibers.  - After exercise, shower and dry off. Use a hair dryer on cool to dry areas that can trap wetness, such as under your arms or breasts.  - Change undergarments and clothing as soon as possible if they became damp from sweat.   - Try topical powders (such as Gold Bond) to prevent moisture build up in the area.

## 2024-01-09 LAB
ALBUMIN/CREAT UR: 5.8 UG/MG (ref 0–30)
CREAT UR-MCNC: 190.4 MG/DL (ref 15–325)
MICROALBUMIN UR DL<=1MG/L-MCNC: 11 UG/ML

## 2024-01-13 NOTE — TELEPHONE ENCOUNTER
No care due was identified.  Health [facility] Embedded Care Due Messages. Reference number: [id number].   1/13/2024 11:03:44 AM CST

## 2024-01-14 NOTE — TELEPHONE ENCOUNTER
Refill Routing Note   Medication(s) are not appropriate for processing by [facility] for the following reason(s):        Required vitals abnormal  No active prescription written by provider    [facility] action(s):  Defer               Appointments  past 12m or future 3m with PCP    Date Provider   Last Visit   1/8/2024 Nerissa Travis MD   Next Visit   7/8/2024 Nerissa Travis MD   ED visits in past 90 days: 0        Note composed:11:54 AM 01/14/2024

## 2024-01-16 RX ORDER — AMLODIPINE BESYLATE 2.5 MG/1
2.5 TABLET ORAL
Qty: 90 TABLET | Refills: 3 | Status: SHIPPED | OUTPATIENT
Start: 2024-01-16

## 2024-01-22 ENCOUNTER — PATIENT MESSAGE (OUTPATIENT)
Dept: INTERNAL MEDICINE | Facility: CLINIC | Age: 79
End: 2024-01-22
Payer: MEDICARE

## 2024-01-30 DIAGNOSIS — Z00.00 ENCOUNTER FOR MEDICARE ANNUAL WELLNESS EXAM: ICD-10-CM

## 2024-01-31 VITALS — SYSTOLIC BLOOD PRESSURE: 119 MMHG | DIASTOLIC BLOOD PRESSURE: 64 MMHG

## 2024-03-15 DIAGNOSIS — I10 HYPERTENSION, UNSPECIFIED TYPE: Primary | ICD-10-CM

## 2024-03-15 RX ORDER — LOSARTAN POTASSIUM 100 MG/1
100 TABLET ORAL DAILY
Qty: 90 TABLET | Refills: 3 | Status: SHIPPED | OUTPATIENT
Start: 2024-03-15

## 2024-03-15 NOTE — TELEPHONE ENCOUNTER
No care due was identified.  [facility] Embedded Care Due Messages. Reference number: [id number].   3/15/2024 9:37:30 AM CDT

## 2024-03-26 NOTE — TELEPHONE ENCOUNTER
No care due was identified.  [facility] Embedded Care Due Messages. Reference number: [id number].   3/26/2024 12:45:39 AM CDT

## 2024-03-27 NOTE — TELEPHONE ENCOUNTER
Refill Routing Note   Medication(s) are not appropriate for processing by [facility] for the following reason(s):        No active prescription written by provider    [facility] action(s):  Defer             Appointments  past 12m or future 3m with PCP    Date Provider   Last Visit   1/8/2024 Nerissa Travis MD   Next Visit   7/8/2024 Nerissa Travis MD   ED visits in past 90 days: 0        Note composed:2:17 PM 03/27/2024

## 2024-03-28 RX ORDER — SIMVASTATIN 20 MG/1
TABLET, FILM COATED ORAL
Qty: 90 TABLET | Refills: 3 | Status: SHIPPED | OUTPATIENT
Start: 2024-03-28

## 2024-04-17 ENCOUNTER — OFFICE VISIT (OUTPATIENT)
Dept: INTERNAL MEDICINE | Facility: CLINIC | Age: 79
End: 2024-04-17
Payer: MEDICARE

## 2024-04-17 VITALS
OXYGEN SATURATION: 97 % | WEIGHT: 127.44 LBS | BODY MASS INDEX: 21.76 KG/M2 | DIASTOLIC BLOOD PRESSURE: 72 MMHG | SYSTOLIC BLOOD PRESSURE: 122 MMHG | HEIGHT: 64 IN | HEART RATE: 82 BPM

## 2024-04-17 DIAGNOSIS — E55.9 VITAMIN D DEFICIENCY: ICD-10-CM

## 2024-04-17 DIAGNOSIS — Z00.00 ENCOUNTER FOR PREVENTIVE HEALTH EXAMINATION: Primary | ICD-10-CM

## 2024-04-17 DIAGNOSIS — I10 PRIMARY HYPERTENSION: ICD-10-CM

## 2024-04-17 DIAGNOSIS — M85.89 OSTEOPENIA OF MULTIPLE SITES: ICD-10-CM

## 2024-04-17 DIAGNOSIS — R41.3 MEMORY LOSS: ICD-10-CM

## 2024-04-17 DIAGNOSIS — G89.29 CHRONIC LOW BACK PAIN WITH SCIATICA, SCIATICA LATERALITY UNSPECIFIED, UNSPECIFIED BACK PAIN LATERALITY: ICD-10-CM

## 2024-04-17 DIAGNOSIS — Z00.00 ENCOUNTER FOR MEDICARE ANNUAL WELLNESS EXAM: ICD-10-CM

## 2024-04-17 DIAGNOSIS — E53.8 VITAMIN B12 DEFICIENCY: ICD-10-CM

## 2024-04-17 DIAGNOSIS — M54.40 CHRONIC LOW BACK PAIN WITH SCIATICA, SCIATICA LATERALITY UNSPECIFIED, UNSPECIFIED BACK PAIN LATERALITY: ICD-10-CM

## 2024-04-17 DIAGNOSIS — D69.2 SENILE PURPURA: ICD-10-CM

## 2024-04-17 DIAGNOSIS — K80.50 CALCULUS OF BILE DUCT WITHOUT CHOLECYSTITIS AND WITHOUT OBSTRUCTION: ICD-10-CM

## 2024-04-17 DIAGNOSIS — G47.00 INSOMNIA, UNSPECIFIED TYPE: ICD-10-CM

## 2024-04-17 PROCEDURE — 1101F PT FALLS ASSESS-DOCD LE1/YR: CPT | Mod: CPTII,S$GLB,, | Performed by: NURSE PRACTITIONER

## 2024-04-17 PROCEDURE — G0439 PPPS, SUBSEQ VISIT: HCPCS | Mod: S$GLB,,, | Performed by: NURSE PRACTITIONER

## 2024-04-17 PROCEDURE — 3288F FALL RISK ASSESSMENT DOCD: CPT | Mod: CPTII,S$GLB,, | Performed by: NURSE PRACTITIONER

## 2024-04-17 PROCEDURE — 1158F ADVNC CARE PLAN TLK DOCD: CPT | Mod: CPTII,S$GLB,, | Performed by: NURSE PRACTITIONER

## 2024-04-17 PROCEDURE — 1170F FXNL STATUS ASSESSED: CPT | Mod: CPTII,S$GLB,, | Performed by: NURSE PRACTITIONER

## 2024-04-17 PROCEDURE — 1126F AMNT PAIN NOTED NONE PRSNT: CPT | Mod: CPTII,S$GLB,, | Performed by: NURSE PRACTITIONER

## 2024-04-17 PROCEDURE — 99999 PR PBB SHADOW E&M-EST. PATIENT-LVL IV: CPT | Mod: PBBFAC,,, | Performed by: NURSE PRACTITIONER

## 2024-04-17 PROCEDURE — 3078F DIAST BP <80 MM HG: CPT | Mod: CPTII,S$GLB,, | Performed by: NURSE PRACTITIONER

## 2024-04-17 PROCEDURE — 3074F SYST BP LT 130 MM HG: CPT | Mod: CPTII,S$GLB,, | Performed by: NURSE PRACTITIONER

## 2024-04-17 RX ORDER — UBIDECARENONE 75 MG
500 CAPSULE ORAL DAILY
COMMUNITY

## 2024-04-17 RX ORDER — LORATADINE 10 MG/1
10 TABLET ORAL DAILY
COMMUNITY
End: 2024-06-05

## 2024-04-17 RX ORDER — CHOLECALCIFEROL (VITAMIN D3) 25 MCG
1000 TABLET ORAL DAILY
COMMUNITY

## 2024-04-17 RX ORDER — RESPIRATORY SYNCYTIAL VISUS VACCINE RECOMBINANT, ADJUVANTED 120MCG/0.5
KIT INTRAMUSCULAR
COMMUNITY
Start: 2024-01-30 | End: 2024-06-05 | Stop reason: ALTCHOICE

## 2024-04-17 NOTE — PATIENT INSTRUCTIONS
Counseling and Referral of Other Preventative  (Italic type indicates deductible and co-insurance are waived)    Patient Name: Torie Richard  Today's Date: 4/17/2024    Health Maintenance       Date Due Completion Date    COVID-19 Vaccine (5 - 2023-24 season) 09/01/2023 5/4/2022    Mammogram 09/29/2024 9/29/2023    DEXA Scan 07/11/2026 7/11/2023    TETANUS VACCINE 03/21/2028 3/21/2018    Lipid Panel 01/08/2029 1/8/2024        No orders of the defined types were placed in this encounter.    The following information is provided to all patients.  This information is to help you find resources for any of the problems found today that may be affecting your health:                  Living healthy guide: www.[facility].[place].gov      Understanding Diabetes: www.diabetes.org      Eating healthy: www.cdc.gov/healthyweight      CDC home safety checklist: www.cdc.gov/steadi/patient.html      Agency on Aging: www.goea.[place].[facility]      Alcoholics anonymous (AA): www.aa.org      Physical Activity: www.basilia.nih.gov/pg9fqxp      Tobacco use: www.quitwithusla.org

## 2024-04-17 NOTE — PROGRESS NOTES
"  Torie Richard presented for a follow-up Medicare AWV today. The following components were reviewed and updated:    Medical history  Family History  Social history  Allergies and Current Medications  Health Risk Assessment  Health Maintenance  Care Team    **See Completed Assessments for Annual Wellness visit with in the encounter summary    The following assessments were completed:  Depression Screening  Cognitive function Screening      Timed Get Up Test  Whisper Test      Opioid documentation:      Patient does not have a current opioid prescription.          Vitals:    04/17/24 0906   BP: 122/72   BP Location: Right arm   Patient Position: Sitting   BP Method: Medium (Manual)   Pulse: 82   SpO2: 97%   Weight: 57.8 kg (127 lb 6.8 oz)   Height: 5' 4" (1.626 m)     Body mass index is 21.87 kg/m².       Physical Exam  Constitutional:       Appearance: Normal appearance. She is normal weight.   HENT:      Head: Normocephalic.      Right Ear: Tympanic membrane and external ear normal.      Left Ear: Tympanic membrane and external ear normal.      Nose: Nose normal.      Mouth/Throat:      Mouth: Mucous membranes are moist.      Pharynx: Oropharynx is clear. No oropharyngeal exudate.   Eyes:      Pupils: Pupils are equal, round, and reactive to light.   Cardiovascular:      Rate and Rhythm: Normal rate and regular rhythm.      Pulses: Normal pulses.      Heart sounds: Normal heart sounds.   Pulmonary:      Effort: Pulmonary effort is normal.      Breath sounds: Normal breath sounds.   Abdominal:      General: Abdomen is flat. Bowel sounds are normal.      Palpations: Abdomen is soft.   Musculoskeletal:         General: Normal range of motion.   Skin:     General: Skin is warm and dry.      Capillary Refill: Capillary refill takes less than 2 seconds.   Neurological:      General: No focal deficit present.      Mental Status: She is alert and oriented to person, place, and time.   Psychiatric:         Mood and " Affect: Mood normal.       Diagnoses and health risks identified today and associated recommendations/orders:  1. Encounter for preventive health examination  All age and gender related screenings discussed     2. Encounter for Medicare annual wellness exam  All age and gender related screenings discussed   - Ambulatory Referral/Consult to Enhanced Annual Wellness Visit (eAWV)    3. Primary hypertension  Problem is stable. Will continue current medication and treatment regimen. Follow up with PCP     4. Vitamin D deficiency  Problem is stable. Will continue current medication and treatment regimen. Follow up with PCP     5. Vitamin B12 deficiency  Problem is stable. Will continue current medication and treatment regimen. Follow up with PCP     6. Calculus of bile duct without cholecystitis and without obstruction  Problem is stable. Will continue current medication and treatment regimen. Follow up with PCP     7. Osteopenia of multiple sites  Problem is stable. Will continue current medication and treatment regimen. Follow up with PCP     8. Chronic low back pain with sciatica, sciatica laterality unspecified, unspecified back pain laterality  Problem is stable. Will continue current medication and treatment regimen. Follow up with PCP     9. Insomnia, unspecified type  Problem is stable. Will continue current medication and treatment regimen. Follow up with PCP     10. Senile purpura  Problem is stable. Will continue current medication and treatment regimen. Follow up with PCP     11. Memory loss   Problem is stable. Will continue current medication and treatment regimen. Follow up with PCP     Provided Torie with a 5-10 year written screening schedule and personal prevention plan. Recommendations were developed using the USPSTF age appropriate recommendations. Education, counseling, and referrals were provided as needed.  After Visit Summary printed and given to patient which includes a list of additional  screenings\tests needed.    Post Discharge Follow-up Today:   Future Appointments:  Future Appointments   Date Time Provider Department Center   7/8/2024  9:30 AM Nerissa Travis MD [facility]       I offered to discuss advanced care planning, including how to pick a person who would make decisions for you if you were unable to make them for yourself, called a health care power of , and what kind of decisions you might make such as use of life sustaining treatments such as ventilators and tube feeding when faced with a life limiting illness recorded on a living will that they will need to know. (How you want to be cared for as you near the end of your natural life)     X Patient is interested in learning more about how to make advanced directives.  I provided them paperwork and offered to discuss this with them.    Lurdes uTrpin MN, APRN, FNP-c  Nurse Practitioner   Internal Medicine   [address] [facility] [address]  [phone]

## 2024-04-21 ENCOUNTER — PATIENT MESSAGE (OUTPATIENT)
Dept: INTERNAL MEDICINE | Facility: CLINIC | Age: 79
End: 2024-04-21
Payer: MEDICARE

## 2024-04-21 DIAGNOSIS — G47.00 INSOMNIA, UNSPECIFIED TYPE: ICD-10-CM

## 2024-04-22 RX ORDER — TRAZODONE HYDROCHLORIDE 100 MG/1
100 TABLET ORAL NIGHTLY
Qty: 90 TABLET | Refills: 1 | Status: SHIPPED | OUTPATIENT
Start: 2024-04-22

## 2024-06-05 ENCOUNTER — OFFICE VISIT (OUTPATIENT)
Dept: INTERNAL MEDICINE | Facility: CLINIC | Age: 79
End: 2024-06-05
Payer: MEDICARE

## 2024-06-05 VITALS
DIASTOLIC BLOOD PRESSURE: 59 MMHG | BODY MASS INDEX: 21.49 KG/M2 | HEART RATE: 67 BPM | OXYGEN SATURATION: 98 % | SYSTOLIC BLOOD PRESSURE: 121 MMHG | WEIGHT: 125.25 LBS

## 2024-06-05 DIAGNOSIS — E53.8 VITAMIN B12 DEFICIENCY: ICD-10-CM

## 2024-06-05 DIAGNOSIS — R42 DIZZINESS AND GIDDINESS: ICD-10-CM

## 2024-06-05 DIAGNOSIS — R07.89 CHEST TIGHTNESS: ICD-10-CM

## 2024-06-05 DIAGNOSIS — R42 DIZZINESS: Primary | ICD-10-CM

## 2024-06-05 DIAGNOSIS — I10 PRIMARY HYPERTENSION: ICD-10-CM

## 2024-06-05 DIAGNOSIS — G47.00 INSOMNIA, UNSPECIFIED TYPE: ICD-10-CM

## 2024-06-05 DIAGNOSIS — R41.3 OTHER AMNESIA: ICD-10-CM

## 2024-06-05 PROCEDURE — 1101F PT FALLS ASSESS-DOCD LE1/YR: CPT | Mod: CPTII,S$GLB,, | Performed by: STUDENT IN AN ORGANIZED HEALTH CARE EDUCATION/TRAINING PROGRAM

## 2024-06-05 PROCEDURE — 1159F MED LIST DOCD IN RCRD: CPT | Mod: CPTII,S$GLB,, | Performed by: STUDENT IN AN ORGANIZED HEALTH CARE EDUCATION/TRAINING PROGRAM

## 2024-06-05 PROCEDURE — 93010 ELECTROCARDIOGRAM REPORT: CPT | Mod: S$GLB,,, | Performed by: INTERNAL MEDICINE

## 2024-06-05 PROCEDURE — 99999 PR PBB SHADOW E&M-EST. PATIENT-LVL V: CPT | Mod: PBBFAC,,, | Performed by: STUDENT IN AN ORGANIZED HEALTH CARE EDUCATION/TRAINING PROGRAM

## 2024-06-05 PROCEDURE — 3074F SYST BP LT 130 MM HG: CPT | Mod: CPTII,S$GLB,, | Performed by: STUDENT IN AN ORGANIZED HEALTH CARE EDUCATION/TRAINING PROGRAM

## 2024-06-05 PROCEDURE — 1126F AMNT PAIN NOTED NONE PRSNT: CPT | Mod: CPTII,S$GLB,, | Performed by: STUDENT IN AN ORGANIZED HEALTH CARE EDUCATION/TRAINING PROGRAM

## 2024-06-05 PROCEDURE — 93005 ELECTROCARDIOGRAM TRACING: CPT | Mod: S$GLB,,, | Performed by: STUDENT IN AN ORGANIZED HEALTH CARE EDUCATION/TRAINING PROGRAM

## 2024-06-05 PROCEDURE — 3288F FALL RISK ASSESSMENT DOCD: CPT | Mod: CPTII,S$GLB,, | Performed by: STUDENT IN AN ORGANIZED HEALTH CARE EDUCATION/TRAINING PROGRAM

## 2024-06-05 PROCEDURE — 99214 OFFICE O/P EST MOD 30 MIN: CPT | Mod: S$GLB,,, | Performed by: STUDENT IN AN ORGANIZED HEALTH CARE EDUCATION/TRAINING PROGRAM

## 2024-06-05 PROCEDURE — G2211 COMPLEX E/M VISIT ADD ON: HCPCS | Mod: S$GLB,,, | Performed by: STUDENT IN AN ORGANIZED HEALTH CARE EDUCATION/TRAINING PROGRAM

## 2024-06-05 PROCEDURE — 3078F DIAST BP <80 MM HG: CPT | Mod: CPTII,S$GLB,, | Performed by: STUDENT IN AN ORGANIZED HEALTH CARE EDUCATION/TRAINING PROGRAM

## 2024-06-05 NOTE — PROGRESS NOTES
Subjective:       Patient ID: Torie Richard is a 78 y.o. female.    Chief Complaint: Dizziness [R42]    Patient is established with me, here today for the following:     HTN, HLD, osteopenia, vitamin D deficiency, insomnia    Dizziness -   Started about 3 months ago  Has been progressively worsening  Worse with positional change, going from lying to sitting or sitting to standing   Denies hearing change, headaches, nausea, vomiting  Has not been drinking fluids recently   Will also experience tightness in the chest when dizziness occurs  Feels like heart is skipping beats     Lab Results       Component                Value               Date                       TSH                      1.091               01/08/2024                 TSH                      1.137               02/14/2023                 TSH                      0.690               05/02/2016              Feels in the past year memory has been worsening  Difficulty with short term memory  Losing train of thought rapidly   Has noticed lapses in long term memory   Will become disoriented in familiar places/locations    Vitamin B12 deficiency -  Currently taking vitamin B12 supplementation daily.  Lab Results       Component                Value               Date                       [id number]               1274 (H)            01/08/2024              HTN -   Currently prescribed amlodipine, losartan.  Patient endorses taking medication as directed.  Denies side effects or concerns while taking medication.  Lab Results       Component                Value               Date                       MICALBCREAT              5.8                 01/08/2024            BP Readings from Last 5 Encounters:  04/17/24 : 122/72  01/31/24 : 119/64  01/08/24 : (!) 144/72  12/31/23 : 131/75  09/19/23 : 124/82     Taking trazodone for insomnia with good effect  Does not feel significant change in symptoms when taking trazodone  Does not feel trazodone related to  dizziness        Review of Systems   Constitutional:  Negative for activity change and unexpected weight change.   HENT:  Negative for hearing loss, rhinorrhea and trouble swallowing.    Eyes:  Negative for discharge and visual disturbance.   Respiratory:  Positive for chest tightness. Negative for wheezing.    Cardiovascular:  Negative for chest pain and palpitations.   Gastrointestinal:  Negative for blood in stool, constipation, diarrhea and vomiting.   Endocrine: Negative for polydipsia and polyuria.   Genitourinary:  Negative for difficulty urinating, dysuria, hematuria and menstrual problem.   Musculoskeletal:  Negative for arthralgias, joint swelling and neck pain.   Neurological:  Positive for weakness. Negative for headaches.   Psychiatric/Behavioral:  Positive for confusion and dysphoric mood.          Current Outpatient Medications   Medication Instructions    acetaminophen (TYLENOL) 500 mg, Oral, Every 6 hours PRN    amLODIPine (NORVASC) 2.5 mg, Oral    cyanocobalamin (VITAMIN B-12) 500 mcg, Oral, Daily    ibuprofen (ADVIL,MOTRIN) 200 mg, Oral, Every 6 hours PRN    losartan (COZAAR) 100 mg, Oral, Daily    magnesium oxide 200 mg, Oral, Nightly    melatonin (MELATIN) 5 mg, Oral    simvastatin (ZOCOR) 20 MG tablet TAKE 1 TABLET BY MOUTH EVERY DAY IN THE EVENING    traZODone (DESYREL) 100 mg, Oral, Nightly    vitamin D (VITAMIN D3) 1,000 Units, Oral, Daily, Taking 50mcg am     Objective:      Vitals:    06/05/24 1302 06/05/24 1316 06/05/24 1321   BP: 136/62 (!) 141/62 (!) 121/59   Pulse: 66 65 67   SpO2: 98%     Weight: 56.8 kg (125 lb 3.5 oz)     PainSc: 0-No pain       Body mass index is 21.49 kg/m².    Home blood pressures:      Physical Exam  Vitals reviewed.   Constitutional:       General: She is not in acute distress.     Appearance: Normal appearance. She is not ill-appearing or diaphoretic.   HENT:      Head: Normocephalic and atraumatic.      Right Ear: Tympanic membrane, ear canal and  external ear normal. There is no impacted cerumen.      Left Ear: Tympanic membrane, ear canal and external ear normal. There is no impacted cerumen.      Nose: Nose normal. No rhinorrhea.      Mouth/Throat:      Mouth: Mucous membranes are moist.      Pharynx: Oropharynx is clear. No oropharyngeal exudate or posterior oropharyngeal erythema.   Eyes:      General: No scleral icterus.        Right eye: No discharge.         Left eye: No discharge.      Conjunctiva/sclera: Conjunctivae normal.   Neck:      Thyroid: No thyromegaly or thyroid tenderness.      Trachea: Trachea normal.   Cardiovascular:      Rate and Rhythm: Normal rate and regular rhythm.      Heart sounds: Normal heart sounds. No murmur heard.     No friction rub. No gallop.   Pulmonary:      Effort: Pulmonary effort is normal. No respiratory distress.      Breath sounds: Normal breath sounds. No stridor. No wheezing, rhonchi or rales.   Musculoskeletal:      Cervical back: Neck supple.   Lymphadenopathy:      Head:      Right side of head: No submandibular or posterior auricular adenopathy.      Left side of head: No submandibular or posterior auricular adenopathy.      Cervical: No cervical adenopathy.      Right cervical: No superficial, deep or posterior cervical adenopathy.     Left cervical: No superficial, deep or posterior cervical adenopathy.      Upper Body:      Right upper body: No supraclavicular adenopathy.      Left upper body: No supraclavicular adenopathy.   Skin:     General: Skin is warm and dry.      Comments: Color WNL   Neurological:      Mental Status: She is alert. Mental status is at baseline.      GCS: GCS eye subscore is 4. GCS verbal subscore is 5. GCS motor subscore is 6.      Cranial Nerves: No cranial nerve deficit.      Motor: No weakness, tremor or abnormal muscle tone.      Deep Tendon Reflexes:      Reflex Scores:       Bicep reflexes are 2+ on the right side and 2+ on the left side.       Brachioradialis reflexes are  2+ on the right side and 2+ on the left side.       Patellar reflexes are 2+ on the right side and 2+ on the left side.     Comments: No pronator drift on exam. Negative Romberg.    CN II, III, IV, VI: EOMI, PERRLA  CN V: Equal sensation to face bilaterally.  CN VII: Closes eyes against resistance and symmetical smile bilaterally  CN VII: Hearing grossly intact  CN IX, X: Elevation of palate and uvula symmetrical.   CN XI: Shoulder shrug against resistance equal bilaterally.  CN XII: No deviation on tongue protrusion.    Shoulder abduction left 5/5 right 5/5  Elbow flexion left 5/5 right 5/5  Elbow extension left 5/5 right 5/5   strength left 5/5 right 5/5  Hip flexion left 5/5 right 5/5  Knee extension left 5/5 right 5/5  Knee flexion left 5/5 right 5/5         Psychiatric:         Mood and Affect: Mood normal.         Behavior: Behavior normal.       Assessment:       1. Dizziness    2. Chest tightness    3. Dizziness and giddiness    4. Other amnesia    5. Primary hypertension    6. Insomnia, unspecified type    7. Vitamin B12 deficiency        Plan:       Dizziness  Orthostatics positive in office  Recommend increasing water intake   EKG in office sinus cheryle  Check labs, echo, holter monitor  RTC in 8-10 weeks for follow up.  -     IN OFFICE EKG 12-LEAD (to Muse)  -     Holter monitor - 48 hour; Future  -     Stress Echo Which stress agent will be used? Treadmill Exercise; Color Flow Doppler? No; Future  -     HIV 1/2 Ag/Ab (4th Gen); Future  -     Treponema Pallidium Antibodies IgG, IgM; Future  -     Ambulatory referral/consult to Neuropsychology; Future  -     CBC Without Differential; Future  -     Comprehensive Metabolic Panel; Future  -     Vitamin B1; Future  -     Vitamin B12; Future  -     T4, Free; Future  -     Folate; Future  -     TSH; Future  -     MRI Brain Without Contrast; Future    Chest tightness  Discussed warning signs and symptoms that would prompt emergent medical evaluation and/or  treatment.  RTC in 8-10 weeks for follow up.  -     Stress Echo Which stress agent will be used? Treadmill Exercise; Color Flow Doppler? No; Future    Dizziness and giddiness  -     HIV 1/2 Ag/Ab (4th Gen); Future  -     Treponema Pallidium Antibodies IgG, IgM; Future  -     Ambulatory referral/consult to Neuropsychology; Future  -     CBC Without Differential; Future  -     Comprehensive Metabolic Panel; Future  -     Vitamin B1; Future  -     Vitamin B12; Future  -     T4, Free; Future  -     Folate; Future  -     TSH; Future  -     MRI Brain Without Contrast; Future    Other amnesia  Referral for memory testing  Check labs and MRI  RTC in 8-10 weeks for follow up.  -     HIV 1/2 Ag/Ab (4th Gen); Future  -     Treponema Pallidium Antibodies IgG, IgM; Future  -     Ambulatory referral/consult to Neuropsychology; Future  -     CBC Without Differential; Future  -     Comprehensive Metabolic Panel; Future  -     Vitamin B1; Future  -     Vitamin B12; Future  -     T4, Free; Future  -     Folate; Future  -     TSH; Future  -     MRI Brain Without Contrast; Future    Primary hypertension  Keep taking the losartan daily.  If home blood pressure is <120 top number or <60 bottom number, do not take the amlodipine that day.   RTC in 8-10 weeks for follow up.    Insomnia, unspecified type  Ok to continue trazodone, consider holding if symptoms persist  RTC in 8-10 weeks for follow up.    Vitamin B12 deficiency  Continue supplementation.  RTC in 8-10 weeks for follow up.      Nerissa Travis MD  6/5/2024

## 2024-06-05 NOTE — PATIENT INSTRUCTIONS
Call this phone number to schedule an appointment with the [facility]: [phone].    Keep taking the losartan daily.  If home blood pressure is <120 top number or <60 bottom number, do not take the amlodipine that day.     Drink approximately 1 liter of water before lunch time and 1 liter before dinner time. Limit fluids within 2-3 hours of bedtime.     Will complete EKG in office.  We will assist with scheduling a time for the Holter Monitor and stress echo.  Keep a log of your symptoms while wearing the Holter Monitor and turn in the log when returning the monitor.   We will assist with scheduling lab work and MRI for memory evaluation as well.

## 2024-06-06 LAB
OHS QRS DURATION: 80 MS
OHS QTC CALCULATION: 416 MS

## 2024-06-18 ENCOUNTER — HOSPITAL ENCOUNTER (OUTPATIENT)
Dept: RADIOLOGY | Facility: OTHER | Age: 79
Discharge: HOME | End: 2024-06-18
Attending: STUDENT IN AN ORGANIZED HEALTH CARE EDUCATION/TRAINING PROGRAM
Payer: MEDICARE

## 2024-06-18 DIAGNOSIS — R42 DIZZINESS AND GIDDINESS: ICD-10-CM

## 2024-06-18 DIAGNOSIS — R41.3 OTHER AMNESIA: ICD-10-CM

## 2024-06-18 DIAGNOSIS — R42 DIZZINESS: ICD-10-CM

## 2024-06-18 PROCEDURE — 70551 MRI BRAIN STEM W/O DYE: CPT | Mod: 26,,, | Performed by: STUDENT IN AN ORGANIZED HEALTH CARE EDUCATION/TRAINING PROGRAM

## 2024-06-18 PROCEDURE — 70551 MRI BRAIN STEM W/O DYE: CPT | Mod: TC

## 2024-07-02 ENCOUNTER — LAB VISIT (OUTPATIENT)
Dept: LAB | Facility: HOSPITAL | Age: 79
End: 2024-07-02
Attending: STUDENT IN AN ORGANIZED HEALTH CARE EDUCATION/TRAINING PROGRAM
Payer: MEDICARE

## 2024-07-02 DIAGNOSIS — R42 DIZZINESS AND GIDDINESS: ICD-10-CM

## 2024-07-02 DIAGNOSIS — R41.3 OTHER AMNESIA: ICD-10-CM

## 2024-07-02 DIAGNOSIS — R42 DIZZINESS: ICD-10-CM

## 2024-07-02 LAB
ALBUMIN SERPL BCP-MCNC: 3.8 G/DL (ref 3.5–5.2)
ALP SERPL-CCNC: 58 U/L (ref 55–135)
ALT SERPL W/O P-5'-P-CCNC: 12 U/L (ref 10–44)
ANION GAP SERPL CALC-SCNC: 9 MMOL/L (ref 8–16)
AST SERPL-CCNC: 17 U/L (ref 10–40)
BILIRUB SERPL-MCNC: 0.4 MG/DL (ref 0.1–1)
BUN SERPL-MCNC: 9 MG/DL (ref 8–23)
CALCIUM SERPL-MCNC: 9.3 MG/DL (ref 8.7–10.5)
CHLORIDE SERPL-SCNC: 106 MMOL/L (ref 95–110)
CO2 SERPL-SCNC: 24 MMOL/L (ref 23–29)
CREAT SERPL-MCNC: 0.8 MG/DL (ref 0.5–1.4)
ERYTHROCYTE [DISTWIDTH] IN BLOOD BY AUTOMATED COUNT: 13 % (ref 11.5–14.5)
EST. GFR  (NO RACE VARIABLE): >60 ML/MIN/1.73 M^2
FOLATE SERPL-MCNC: 18.9 NG/ML (ref 4–24)
GLUCOSE SERPL-MCNC: 87 MG/DL (ref 70–110)
HCT VFR BLD AUTO: 39.3 % (ref 37–48.5)
HGB BLD-MCNC: 12.7 G/DL (ref 12–16)
HIV 1+2 AB+HIV1 P24 AG SERPL QL IA: NORMAL
MCH RBC QN AUTO: 32.2 PG (ref 27–31)
MCHC RBC AUTO-ENTMCNC: 32.3 G/DL (ref 32–36)
MCV RBC AUTO: 100 FL (ref 82–98)
PLATELET # BLD AUTO: 185 K/UL (ref 150–450)
PMV BLD AUTO: 11.5 FL (ref 9.2–12.9)
POTASSIUM SERPL-SCNC: 3.8 MMOL/L (ref 3.5–5.1)
PROT SERPL-MCNC: 6.4 G/DL (ref 6–8.4)
RBC # BLD AUTO: 3.95 M/UL (ref 4–5.4)
SODIUM SERPL-SCNC: 139 MMOL/L (ref 136–145)
T4 FREE SERPL-MCNC: 1.07 NG/DL (ref 0.71–1.51)
TREPONEMA PALLIDUM IGG+IGM AB [PRESENCE] IN SERUM OR PLASMA BY IMMUNOASSAY: NONREACTIVE
TSH SERPL DL<=0.005 MIU/L-ACNC: 0.47 UIU/ML (ref 0.4–4)
VIT B12 SERPL-MCNC: >2000 PG/ML (ref 210–950)
WBC # BLD AUTO: 4.64 K/UL (ref 3.9–12.7)

## 2024-07-02 PROCEDURE — 84443 ASSAY THYROID STIM HORMONE: CPT | Performed by: STUDENT IN AN ORGANIZED HEALTH CARE EDUCATION/TRAINING PROGRAM

## 2024-07-02 PROCEDURE — 84439 ASSAY OF FREE THYROXINE: CPT | Performed by: STUDENT IN AN ORGANIZED HEALTH CARE EDUCATION/TRAINING PROGRAM

## 2024-07-02 PROCEDURE — 82746 ASSAY OF FOLIC ACID SERUM: CPT | Performed by: STUDENT IN AN ORGANIZED HEALTH CARE EDUCATION/TRAINING PROGRAM

## 2024-07-02 PROCEDURE — 36415 COLL VENOUS BLD VENIPUNCTURE: CPT | Performed by: STUDENT IN AN ORGANIZED HEALTH CARE EDUCATION/TRAINING PROGRAM

## 2024-07-02 PROCEDURE — 86593 SYPHILIS TEST NON-TREP QUANT: CPT | Performed by: STUDENT IN AN ORGANIZED HEALTH CARE EDUCATION/TRAINING PROGRAM

## 2024-07-02 PROCEDURE — 87389 HIV-1 AG W/HIV-1&-2 AB AG IA: CPT | Performed by: STUDENT IN AN ORGANIZED HEALTH CARE EDUCATION/TRAINING PROGRAM

## 2024-07-02 PROCEDURE — 85027 COMPLETE CBC AUTOMATED: CPT | Performed by: STUDENT IN AN ORGANIZED HEALTH CARE EDUCATION/TRAINING PROGRAM

## 2024-07-02 PROCEDURE — 80053 COMPREHEN METABOLIC PANEL: CPT | Performed by: STUDENT IN AN ORGANIZED HEALTH CARE EDUCATION/TRAINING PROGRAM

## 2024-07-02 PROCEDURE — 84425 ASSAY OF VITAMIN B-1: CPT | Performed by: STUDENT IN AN ORGANIZED HEALTH CARE EDUCATION/TRAINING PROGRAM

## 2024-07-02 PROCEDURE — 82607 VITAMIN B-12: CPT | Performed by: STUDENT IN AN ORGANIZED HEALTH CARE EDUCATION/TRAINING PROGRAM

## 2024-07-09 LAB — VIT B1 BLD-MCNC: 72 UG/L (ref 38–122)

## 2024-07-15 ENCOUNTER — HOSPITAL ENCOUNTER (OUTPATIENT)
Dept: CARDIOLOGY | Facility: HOSPITAL | Age: 79
Discharge: HOME | End: 2024-07-15
Attending: STUDENT IN AN ORGANIZED HEALTH CARE EDUCATION/TRAINING PROGRAM
Payer: MEDICARE

## 2024-07-15 DIAGNOSIS — R42 DIZZINESS: ICD-10-CM

## 2024-07-15 DIAGNOSIS — R07.89 CHEST TIGHTNESS: ICD-10-CM

## 2024-07-15 LAB
ASCENDING AORTA: 3.34 CM
CV ECHO LV RWT: 0.32 CM
CV STRESS BASE HR: 73 BPM
DIASTOLIC BLOOD PRESSURE: 76 MMHG
DOP CALC LVOT AREA: 2.4 CM2
DOP CALC LVOT DIAMETER: 1.76 CM
DOP CALC LVOT PEAK VEL: 0.93 M/S
DOP CALC LVOT STROKE VOLUME: 47.98 CM3
DOP CALCLVOT PEAK VEL VTI: 19.73 CM
E WAVE DECELERATION TIME: 210.81 MSEC
E/A RATIO: 0.91
E/E' RATIO: 6.82 M/S
ECHO LV POSTERIOR WALL: 0.64 CM (ref 0.6–1.1)
EJECTION FRACTION: 63 %
FRACTIONAL SHORTENING: 38 % (ref 28–44)
INTERVENTRICULAR SEPTUM: 0.72 CM (ref 0.6–1.1)
LA MAJOR: 4.6 CM
LA MINOR: 4.73 CM
LA WIDTH: 3.29 CM
LEFT ATRIUM SIZE: 2.98 CM
LEFT ATRIUM VOLUME MOD: 32.59 CM3
LEFT ATRIUM VOLUME: 38.87 CM3
LEFT INTERNAL DIMENSION IN SYSTOLE: 2.44 CM (ref 2.1–4)
LEFT VENTRICLE DIASTOLIC VOLUME: 68.13 ML
LEFT VENTRICLE SYSTOLIC VOLUME: 20.97 ML
LEFT VENTRICULAR INTERNAL DIMENSION IN DIASTOLE: 3.95 CM (ref 3.5–6)
LEFT VENTRICULAR MASS: 73.89 G
LV LATERAL E/E' RATIO: 6.44 M/S
LV SEPTAL E/E' RATIO: 7.25 M/S
MV A" WAVE DURATION": 11.7 MSEC
MV PEAK A VEL: 0.64 M/S
MV PEAK E VEL: 0.58 M/S
MV STENOSIS PRESSURE HALF TIME: 61.13 MS
MV VALVE AREA P 1/2 METHOD: 3.6 CM2
OHS CV CPX 1 MINUTE RECOVERY HEART RATE: 103 BPM
OHS CV CPX 85 PERCENT MAX PREDICTED HEART RATE MALE: 122
OHS CV CPX ESTIMATED METS: 6
OHS CV CPX MAX PREDICTED HEART RATE: 143
OHS CV CPX PATIENT IS FEMALE: 1
OHS CV CPX PATIENT IS MALE: 0
OHS CV CPX PEAK DIASTOLIC BLOOD PRESSURE: 73 MMHG
OHS CV CPX PEAK HEAR RATE: 125 BPM
OHS CV CPX PEAK RATE PRESSURE PRODUCT: NORMAL
OHS CV CPX PEAK SYSTOLIC BLOOD PRESSURE: 199 MMHG
OHS CV CPX PERCENT MAX PREDICTED HEART RATE ACHIEVED: 90
OHS CV CPX RATE PRESSURE PRODUCT PRESENTING: NORMAL
PISA TR MAX VEL: 2.29 M/S
POST STRESS EJECTION FRACTION: 70 %
PULM VEIN S/D RATIO: 1.38
PV PEAK D VEL: 0.26 M/S
PV PEAK S VEL: 0.36 M/S
RA MAJOR: 4.36 CM
RA PRESSURE ESTIMATED: 3 MMHG
RA WIDTH: 2.49 CM
RIGHT VENTRICLE DIASTOLIC BASEL DIMENSION: 2.2 CM
RV TB RVSP: 5 MMHG
SINUS: 2.96 CM
STJ: 2.55 CM
STRESS ECHO POST EXERCISE DUR MIN: 8 MINUTES
STRESS ECHO POST EXERCISE DUR SEC: 18 SECONDS
SYSTOLIC BLOOD PRESSURE: 152 MMHG
TDI LATERAL: 0.09 M/S
TDI SEPTAL: 0.08 M/S
TDI: 0.09 M/S
TR MAX PG: 21 MMHG
TRICUSPID ANNULAR PLANE SYSTOLIC EXCURSION: 2.05 CM
TV REST PULMONARY ARTERY PRESSURE: 24 MMHG

## 2024-07-15 PROCEDURE — 93351 STRESS TTE COMPLETE: CPT | Mod: 26,,, | Performed by: INTERNAL MEDICINE

## 2024-07-15 PROCEDURE — 93351 STRESS TTE COMPLETE: CPT | Mod: PO

## 2024-07-15 PROCEDURE — 93227 XTRNL ECG REC<48 HR R&I: CPT | Mod: ,,, | Performed by: INTERNAL MEDICINE

## 2024-07-15 PROCEDURE — 93225 XTRNL ECG REC<48 HRS REC: CPT | Mod: PO

## 2024-07-18 LAB
OHS CV EVENT MONITOR DAY: 0
OHS CV HOLTER LENGTH DECIMAL HOURS: 48
OHS CV HOLTER LENGTH HOURS: 48
OHS CV HOLTER LENGTH MINUTES: 0
OHS CV HOLTER SINUS AVERAGE HR: 71
OHS CV HOLTER SINUS MAX HR: 130
OHS CV HOLTER SINUS MIN HR: 53

## 2024-07-28 ENCOUNTER — PATIENT MESSAGE (OUTPATIENT)
Dept: NEUROLOGY | Facility: CLINIC | Age: 79
End: 2024-07-28
Payer: MEDICARE

## 2024-07-28 ENCOUNTER — PATIENT MESSAGE (OUTPATIENT)
Dept: INTERNAL MEDICINE | Facility: CLINIC | Age: 79
End: 2024-07-28
Payer: MEDICARE

## 2024-08-02 NOTE — TELEPHONE ENCOUNTER
Called pt lvm stating procedure will be cancelled due to 's schedule.   [employer] was given to patient on 3/18/2020 3:11 PM

## 2024-09-11 ENCOUNTER — TELEPHONE (OUTPATIENT)
Dept: INTERNAL MEDICINE | Facility: CLINIC | Age: 79
End: 2024-09-11
Payer: MEDICARE

## 2024-09-11 DIAGNOSIS — R42 DIZZINESS: Primary | ICD-10-CM

## 2024-09-11 DIAGNOSIS — R07.89 CHEST TIGHTNESS: ICD-10-CM

## 2024-09-11 NOTE — TELEPHONE ENCOUNTER
Spoke with patient and attempted to schedule cardio appointment but patient said she had already completed stress test and EKG. Patient does not feel she needs to see a cardiologist at this time and refused scheduling. She'd like to further discuss at her upcoming visit with you.

## 2024-09-11 NOTE — TELEPHONE ENCOUNTER
----- Message from Nerissa Travis MD sent at 9/11/2024  1:58 PM CDT -----  Please assist with scheduling cardiology appointment, thank you!

## 2024-09-20 ENCOUNTER — OFFICE VISIT (OUTPATIENT)
Dept: CARDIOLOGY | Facility: CLINIC | Age: 79
End: 2024-09-20
Payer: MEDICARE

## 2024-09-20 VITALS
BODY MASS INDEX: 19.81 KG/M2 | HEART RATE: 81 BPM | SYSTOLIC BLOOD PRESSURE: 120 MMHG | WEIGHT: 116 LBS | HEIGHT: 64 IN | OXYGEN SATURATION: 97 % | DIASTOLIC BLOOD PRESSURE: 62 MMHG

## 2024-09-20 DIAGNOSIS — R42 DIZZINESS: ICD-10-CM

## 2024-09-20 DIAGNOSIS — R07.89 CHEST TIGHTNESS: ICD-10-CM

## 2024-09-20 PROCEDURE — 3078F DIAST BP <80 MM HG: CPT | Mod: CPTII,S$GLB,, | Performed by: STUDENT IN AN ORGANIZED HEALTH CARE EDUCATION/TRAINING PROGRAM

## 2024-09-20 PROCEDURE — 3288F FALL RISK ASSESSMENT DOCD: CPT | Mod: CPTII,S$GLB,, | Performed by: STUDENT IN AN ORGANIZED HEALTH CARE EDUCATION/TRAINING PROGRAM

## 2024-09-20 PROCEDURE — 3074F SYST BP LT 130 MM HG: CPT | Mod: CPTII,S$GLB,, | Performed by: STUDENT IN AN ORGANIZED HEALTH CARE EDUCATION/TRAINING PROGRAM

## 2024-09-20 PROCEDURE — 99204 OFFICE O/P NEW MOD 45 MIN: CPT | Mod: S$GLB,,, | Performed by: STUDENT IN AN ORGANIZED HEALTH CARE EDUCATION/TRAINING PROGRAM

## 2024-09-20 PROCEDURE — 1159F MED LIST DOCD IN RCRD: CPT | Mod: CPTII,S$GLB,, | Performed by: STUDENT IN AN ORGANIZED HEALTH CARE EDUCATION/TRAINING PROGRAM

## 2024-09-20 PROCEDURE — 99999 PR PBB SHADOW E&M-EST. PATIENT-LVL IV: CPT | Mod: PBBFAC,,, | Performed by: STUDENT IN AN ORGANIZED HEALTH CARE EDUCATION/TRAINING PROGRAM

## 2024-09-20 PROCEDURE — 1101F PT FALLS ASSESS-DOCD LE1/YR: CPT | Mod: CPTII,S$GLB,, | Performed by: STUDENT IN AN ORGANIZED HEALTH CARE EDUCATION/TRAINING PROGRAM

## 2024-09-20 NOTE — PROGRESS NOTES
Subjective:   @Patient ID:  Torie Richard is a 79 y.o. female who presents for evaluation of No chief complaint on file.      HPI:   Mrs. Richard is a very pleasant 78 yo woman with hx of HLD that was referred for chest pain. Patient reports it was one episode in 01/24 which sound possible cardiac at the time. However, no more episodes and cuts her grass with no symptoms of chest pain. LDL at goal, A1c normal, CTA chest with no sginificant calcifications. Denies cp, sob, palpitations, presyncope/dizziness, syncope, orthopnea, PND, bendopnea, decrease ET, NVDC, fever, chills.      07/15/24 Echo      Left Ventricle: The left ventricle is normal in size. Normal wall thickness. There is normal systolic function with a visually estimated ejection fraction of 60 - 65%. Ejection fraction by visual approximation is 63%. There is normal diastolic function.    Right Ventricle: Normal right ventricular cavity size. Wall thickness is normal. Systolic function is normal.    Aortic Valve: There is mild aortic valve sclerosis.    Tricuspid Valve: There is mild regurgitation.    Pulmonary Artery: The estimated pulmonary artery systolic pressure is 24 mmHg.    IVC/SVC: Normal venous pressure at 3 mmHg.    Stress Protocol: The patient exercised for 8 minutes 18 seconds on a medium ramp protocol, corresponding to a functional capacity of 6METS, achieving a peak heart rate of 125 bpm, which is 90% of the age predicted maximum heart rate. Their exercise capacity was mildly impaired. The test was stopped because the patient experienced fatigue.    Baseline ECG: The Baseline ECG reveals sinus rhythm. The axis is normal. The ST segments are normal.    Stress ECG: There are no ST segment deviation identified during the protocol. There are no arrhythmias during stress. There is normal blood pressure response with stress.    ECG Conclusion: The ECG portion of the study is negative for ischemia.    Post-stress Echo: The left ventricle  "systolic function is hyperdynamic with an EF of 70%. Right ventricle systolic function is normal.    Post-stress Impression: The study is negative with no echocardiographic evidence of stress induced ischemia.    Patient Active Problem List    Diagnosis Date Noted    Senile purpura 04/17/2024    Vitamin D deficiency 07/05/2023    Vitamin B12 deficiency 07/05/2023    Osteopenia of multiple sites 02/01/2022    Achilles tendonosis of right lower extremity 03/19/2021    Bile duct stone 06/14/2019    Primary hypertension 03/08/2019    Rosacea 03/11/2013    Chronic low back pain 03/11/2013    Mixed hyperlipidemia 10/18/2012    Insomnia 10/18/2012                    LABS  CBC  @LABRCNTIP(WBC:3,RBC:3,HGB:3,HCT:3,PLT:3,MCV:3,MCH:3,MCHC:3)@  BMP  @LABRCNTIP(NA:3,K:3,CO2:3,CL:3,BUN:3,Creatinine:3,GLU:3,GFR:3)@    POCT-Glucose  No results found for: "POCTGLUCOSE"    @LABRCNTIP(Calcium:3,MG:3,PHOS:3)@  LFT  @LABRCNTIP(PROT:3,Albumin:3,,Bilitot:3,AST:3,Alkphos:3,ALT:3)@  GFR     COAGS  @LABRCNTIP(PT:3,INR:3,APTT:3)@  CE  @LABRCNTIP(troponini:3,cktotal:3,ckmb:3)@  ABGs  @[id number](PH,PCO2,PO2,HCO3,POCSATURATED,BE)@  BNP  @LABRCNTIP(BNP:3)@    LAST HbA1c  Lab Results   Component Value Date    HGBA1C 5.3 01/08/2024       Lipid panel  Lab Results   Component Value Date    CHOL 164 01/08/2024    CHOL 163 02/14/2023    CHOL 154 02/02/2023     Lab Results   Component Value Date    HDL 55 01/08/2024    HDL 61 02/14/2023    HDL 56 02/02/2023     Lab Results   Component Value Date    LDLCALC 84.8 01/08/2024    LDLCALC 79.0 02/14/2023    LDLCALC 80.6 02/02/2023     Lab Results   Component Value Date    TRIG 121 01/08/2024    TRIG 115 02/14/2023    TRIG 87 02/02/2023     Lab Results   Component Value Date    CHOLHDL 33.5 01/08/2024    CHOLHDL 37.4 02/14/2023    CHOLHDL 36.4 02/02/2023            Review of Systems   All other systems reviewed and are negative.      Objective:   Physical Exam  Gen: AOx3, NAD, comfortable appearing  HEENT: " NCAT, PERRL, EOMI, MMM, JVP, no thyromegaly, lymphadenopathy appreciated  CV: RRR, S1/S2 appreciated, no murmur; no gallop or rubs  Resp: CTAB, no increased WOB  Abdomen: Soft, NT, ND, +BS  Ext: 2+ distal pulses, no edema appreciated  Skin: Warm, dry, no rashes appreciated  Psych: Good mood, Affect  Neuro: AAOx4, Strength 5/5 in extremities bilaterally; sensation to touch equal throughout, follows commands        Assessment:     1. Dizziness    2. Chest tightness        Plan:   Chest pain-- currently asymptomatic since her only episode 01/24 that possible cardiac cp. SE normal. Echo normal. LDL, A1c normal. CTA chest minimal ca++. Will monitor every 6-12 months. Was told if she has any recurrence of any symptoms to go to the ER and will follow up.  Target BP < 130/80 mmHg  Dietary changes to address TG  If no improvement consider vascepa or fenofibrate   Discussed the importance of med compliance, heart healthy diet, and regular exercise.          He expressed verbal understanding and agreed with the plan    Pertinent cardiac images and EKG reviewed independently.    Continue with current medical plan and lifestyle changes.  Return sooner for concerns or questions. If symptoms persist go to the ED.  I have reviewed all pertinent data including patient's medical history in detail and updated the computerized patient record.     Total time spent counseling greater than fifty percent of total visit time.  Counseling included discussion regarding imaging findings, diagnosis, possibilities, treatment options, risks and benefits.    Thank you for the opportunity to care for this patient. Will be available for questions if needed.             She expressed verbal understanding and agreed with the plan        Patient's Medications   New Prescriptions    No medications on file   Previous Medications    ACETAMINOPHEN (TYLENOL) 500 MG TABLET    Take 500 mg by mouth every 6 (six) hours as needed for Pain.    AMLODIPINE  (NORVASC) 2.5 MG TABLET    TAKE 1 TABLET BY MOUTH EVERY DAY    CYANOCOBALAMIN (VITAMIN B-12) 500 MCG TABLET    Take 500 mcg by mouth once daily.    IBUPROFEN (ADVIL,MOTRIN) 200 MG TABLET    Take 200 mg by mouth every 6 (six) hours as needed for Pain.    LOSARTAN (COZAAR) 100 MG TABLET    Take 1 tablet (100 mg total) by mouth once daily.    MAGNESIUM OXIDE 200 MG MAGNESIUM TAB    Take 200 mg by mouth every evening.    MELATONIN 5 MG TAB    Take 5 mg by mouth.    SIMVASTATIN (ZOCOR) 20 MG TABLET    TAKE 1 TABLET BY MOUTH EVERY DAY IN THE EVENING    TRAZODONE (DESYREL) 100 MG TABLET    Take 1 tablet (100 mg total) by mouth every evening.    VITAMIN D (VITAMIN D3) 1000 UNITS TAB    Take 1,000 Units by mouth once daily. Taking 50mcg am   Modified Medications    No medications on file   Discontinued Medications    No medications on file        Esau Daniels MD  Cardiovascular Disease [facility]

## 2024-09-24 ENCOUNTER — TELEPHONE (OUTPATIENT)
Dept: INTERNAL MEDICINE | Facility: CLINIC | Age: 79
End: 2024-09-24
Payer: MEDICARE

## 2024-09-24 ENCOUNTER — OFFICE VISIT (OUTPATIENT)
Dept: INTERNAL MEDICINE | Facility: CLINIC | Age: 79
End: 2024-09-24
Payer: MEDICARE

## 2024-09-24 VITALS
DIASTOLIC BLOOD PRESSURE: 72 MMHG | HEART RATE: 66 BPM | WEIGHT: 117.31 LBS | BODY MASS INDEX: 20.13 KG/M2 | SYSTOLIC BLOOD PRESSURE: 116 MMHG | OXYGEN SATURATION: 98 %

## 2024-09-24 DIAGNOSIS — R63.4 UNINTENTIONAL WEIGHT LOSS: ICD-10-CM

## 2024-09-24 DIAGNOSIS — I10 PRIMARY HYPERTENSION: ICD-10-CM

## 2024-09-24 DIAGNOSIS — R42 DIZZINESS: Primary | ICD-10-CM

## 2024-09-24 DIAGNOSIS — R41.3 MEMORY LOSS: ICD-10-CM

## 2024-09-24 DIAGNOSIS — G47.00 INSOMNIA, UNSPECIFIED TYPE: ICD-10-CM

## 2024-09-24 PROCEDURE — 1126F AMNT PAIN NOTED NONE PRSNT: CPT | Mod: CPTII,S$GLB,, | Performed by: STUDENT IN AN ORGANIZED HEALTH CARE EDUCATION/TRAINING PROGRAM

## 2024-09-24 PROCEDURE — 3078F DIAST BP <80 MM HG: CPT | Mod: CPTII,S$GLB,, | Performed by: STUDENT IN AN ORGANIZED HEALTH CARE EDUCATION/TRAINING PROGRAM

## 2024-09-24 PROCEDURE — 99999 PR PBB SHADOW E&M-EST. PATIENT-LVL IV: CPT | Mod: PBBFAC,,, | Performed by: STUDENT IN AN ORGANIZED HEALTH CARE EDUCATION/TRAINING PROGRAM

## 2024-09-24 PROCEDURE — 1159F MED LIST DOCD IN RCRD: CPT | Mod: CPTII,S$GLB,, | Performed by: STUDENT IN AN ORGANIZED HEALTH CARE EDUCATION/TRAINING PROGRAM

## 2024-09-24 PROCEDURE — 3288F FALL RISK ASSESSMENT DOCD: CPT | Mod: CPTII,S$GLB,, | Performed by: STUDENT IN AN ORGANIZED HEALTH CARE EDUCATION/TRAINING PROGRAM

## 2024-09-24 PROCEDURE — G2211 COMPLEX E/M VISIT ADD ON: HCPCS | Mod: S$GLB,,, | Performed by: STUDENT IN AN ORGANIZED HEALTH CARE EDUCATION/TRAINING PROGRAM

## 2024-09-24 PROCEDURE — 99214 OFFICE O/P EST MOD 30 MIN: CPT | Mod: S$GLB,,, | Performed by: STUDENT IN AN ORGANIZED HEALTH CARE EDUCATION/TRAINING PROGRAM

## 2024-09-24 PROCEDURE — 3074F SYST BP LT 130 MM HG: CPT | Mod: CPTII,S$GLB,, | Performed by: STUDENT IN AN ORGANIZED HEALTH CARE EDUCATION/TRAINING PROGRAM

## 2024-09-24 PROCEDURE — 1101F PT FALLS ASSESS-DOCD LE1/YR: CPT | Mod: CPTII,S$GLB,, | Performed by: STUDENT IN AN ORGANIZED HEALTH CARE EDUCATION/TRAINING PROGRAM

## 2024-09-24 RX ORDER — MIRTAZAPINE 7.5 MG/1
7.5 TABLET, FILM COATED ORAL NIGHTLY
Qty: 90 TABLET | Refills: 1 | Status: SHIPPED | OUTPATIENT
Start: 2024-09-24

## 2024-09-24 NOTE — TELEPHONE ENCOUNTER
Patient was interested in rescheduling her appointment with Dr. Knutson, she was hoping for an in person evaluation. Would you mind helping her set this up? Thank you!

## 2024-09-24 NOTE — PROGRESS NOTES
"Subjective:       Patient ID: Torie Richard is a 79 y.o. female.    Chief Complaint: Dizziness [R42]      Patient is established with me, here today for the following:     HTN, HLD, osteopenia, vitamin D deficiency, insomnia     Dizziness -   Holter monitor with multiple PAC's and PVC's  Stress echo negative for ischemia  MRI brain without concerning findings  Followed with Dr. Jeremiah Day for cardiology  Recommended continued monitoring and re-visit should symptoms return/worsen  Will follow up in 6 months  Endorses no further episodes of dizziness or CP     Lab Results       Component                Value               Date                       TSH                      0.472               07/02/2024                 TSH                      1.091               01/08/2024                 TSH                      1.137               02/14/2023              Lab Results       Component                Value               Date                       HGB                      12.7                07/02/2024                 HCT                      39.3                07/02/2024                 MCV                      100 (H)             07/02/2024                 RDW                      13.0                07/02/2024            Lab Results       Component                Value               Date                       [id number]               >2000 (H)           07/02/2024            Lab Results       Component                Value               Date                       FOLATE                   18.9                07/02/2024                 Had to cancel the appointment with neuropsychology   Interested in following up  Per prior note:  "Feels in the past year memory has been worsening  Difficulty with short term memory  Losing train of thought rapidly   Has noticed lapses in long term memory   Will become disoriented in familiar places/locations"  Has also been noticing early satiety and unintentional weight " loss    Wt Readings from Last 10 Encounters:  09/24/24 : 53.2 kg (117 lb 4.6 oz)  09/20/24 : 52.6 kg (116 lb)  06/05/24 : 56.8 kg (125 lb 3.5 oz)  04/17/24 : 57.8 kg (127 lb 6.8 oz)  01/08/24 : 60.7 kg (133 lb 13.1 oz)  12/31/23 : 61.7 kg (136 lb)  09/19/23 : 62 kg (136 lb 11 oz)  07/05/23 : 63.9 kg (140 lb 14 oz)  02/14/23 : 64.1 kg (141 lb 5 oz)  01/12/23 : 63.1 kg (139 lb 1.8 oz)    Taking trazodone for insomnia with good effect, but feels groggy afterwards  Is concerned trazodone may be worsening memory concerns     HTN -   Currently prescribed amlodipine, losartan.  Patient endorses taking medication as directed.  Denies side effects or concerns while taking medication.  Lab Results       Component                Value               Date                       MICALBCREAT              5.8                 01/08/2024            BP Readings from Last 5 Encounters:  09/20/24 : 120/62  06/05/24 : (!) 121/59  04/17/24 : 122/72  01/31/24 : 119/64  01/08/24 : (!) 144/72          Review of Systems   Constitutional:  Positive for activity change and unexpected weight change.   Respiratory:  Negative for shortness of breath.    Cardiovascular:  Negative for chest pain.   Gastrointestinal:  Negative for abdominal pain, blood in stool, constipation, diarrhea, nausea and vomiting.   Neurological:  Negative for dizziness and syncope.   Psychiatric/Behavioral:  Positive for confusion and sleep disturbance.          Current Outpatient Medications   Medication Instructions    acetaminophen (TYLENOL) 500 mg, Oral, Every 6 hours PRN    amLODIPine (NORVASC) 2.5 mg, Oral    cyanocobalamin (VITAMIN B-12) 500 mcg, Oral, Daily    ibuprofen (ADVIL,MOTRIN) 200 mg, Oral, Every 6 hours PRN    losartan (COZAAR) 100 mg, Oral, Daily    magnesium oxide 200 mg, Oral, Nightly    melatonin (MELATIN) 5 mg, Oral    mirtazapine (REMERON) 7.5 mg, Oral, Nightly    simvastatin (ZOCOR) 20 MG tablet TAKE 1 TABLET BY MOUTH EVERY DAY IN THE EVENING     vitamin D (VITAMIN D3) 1,000 Units, Oral, Daily, Taking 50mcg am     Objective:      Vitals:    09/24/24 1040   BP: 116/72   Pulse: 66   SpO2: 98%   Weight: 53.2 kg (117 lb 4.6 oz)   PainSc: 0-No pain     Body mass index is 20.13 kg/m².    Physical Exam  Vitals reviewed.   Constitutional:       General: She is not in acute distress.     Appearance: She is not ill-appearing or diaphoretic.   Cardiovascular:      Rate and Rhythm: Normal rate and regular rhythm.      Heart sounds: Normal heart sounds. No murmur heard.     No friction rub. No gallop.   Pulmonary:      Effort: Pulmonary effort is normal. No respiratory distress.      Breath sounds: Normal breath sounds. No stridor. No wheezing, rhonchi or rales.   Skin:     General: Skin is warm and dry.      Comments: Color WNL   Neurological:      Mental Status: She is alert. Mental status is at baseline.   Psychiatric:         Mood and Affect: Mood normal.         Behavior: Behavior normal.         Assessment:       1. Dizziness    2. Memory loss    3. Unintentional weight loss    4. Insomnia, unspecified type    5. Primary hypertension        Plan:       Dizziness  Continue evaluation and management per cardiologist.  -     CT Chest Abdomen Pelvis With IV Contrast (XPD) NO Oral Contrast; Future  -     Creatinine, serum; Future    Memory loss  Sent message to memory clinic staff requesting reschedule of appointment   -     Creatinine, serum; Future    Unintentional weight loss  Discussed keeping food journal for calorie count   For calorie goal, please ensure eating >1200 kcal/day with goal 9082-0764 kcal/day.  RTC in 3 months for follow up.  -     CT Chest Abdomen Pelvis With IV Contrast (XPD) NO Oral Contrast; Future    Insomnia, unspecified type  Discontinue trazodone  Start mirtazapine nightly  RTC in 3 months for follow up.  -     mirtazapine (REMERON) 7.5 MG Tab; Take 1 tablet (7.5 mg total) by mouth every evening.    Primary hypertension  Continue current  medications.  RTC in 3 months for follow up.      Nerissa Travis MD  9/24/2024

## 2024-09-26 ENCOUNTER — LAB VISIT (OUTPATIENT)
Dept: LAB | Facility: OTHER | Age: 79
End: 2024-09-26
Attending: STUDENT IN AN ORGANIZED HEALTH CARE EDUCATION/TRAINING PROGRAM
Payer: MEDICARE

## 2024-09-26 DIAGNOSIS — R42 DIZZINESS: ICD-10-CM

## 2024-09-26 DIAGNOSIS — R41.3 MEMORY LOSS: ICD-10-CM

## 2024-09-26 LAB
CREAT SERPL-MCNC: 0.8 MG/DL (ref 0.5–1.4)
EST. GFR  (NO RACE VARIABLE): >60 ML/MIN/1.73 M^2

## 2024-09-26 PROCEDURE — 82565 ASSAY OF CREATININE: CPT | Performed by: STUDENT IN AN ORGANIZED HEALTH CARE EDUCATION/TRAINING PROGRAM

## 2024-09-26 PROCEDURE — 36415 COLL VENOUS BLD VENIPUNCTURE: CPT | Performed by: STUDENT IN AN ORGANIZED HEALTH CARE EDUCATION/TRAINING PROGRAM

## 2024-09-28 ENCOUNTER — HOSPITAL ENCOUNTER (OUTPATIENT)
Dept: RADIOLOGY | Facility: OTHER | Age: 79
Discharge: HOME | End: 2024-09-28
Attending: STUDENT IN AN ORGANIZED HEALTH CARE EDUCATION/TRAINING PROGRAM
Payer: MEDICARE

## 2024-09-28 DIAGNOSIS — R42 DIZZINESS: ICD-10-CM

## 2024-09-28 DIAGNOSIS — R63.4 UNINTENTIONAL WEIGHT LOSS: ICD-10-CM

## 2024-09-28 PROCEDURE — 71260 CT THORAX DX C+: CPT | Mod: 26,,, | Performed by: RADIOLOGY

## 2024-09-28 PROCEDURE — 74177 CT ABD & PELVIS W/CONTRAST: CPT | Mod: TC

## 2024-09-28 PROCEDURE — 25500020 PHARM REV CODE 255: Performed by: STUDENT IN AN ORGANIZED HEALTH CARE EDUCATION/TRAINING PROGRAM

## 2024-09-28 PROCEDURE — 74177 CT ABD & PELVIS W/CONTRAST: CPT | Mod: 26,,, | Performed by: RADIOLOGY

## 2024-09-28 RX ADMIN — IOHEXOL 75 ML: 350 INJECTION, SOLUTION INTRAVENOUS at 11:09

## 2024-10-17 DIAGNOSIS — G47.00 INSOMNIA, UNSPECIFIED TYPE: ICD-10-CM

## 2024-10-17 RX ORDER — TRAZODONE HYDROCHLORIDE 100 MG/1
100 TABLET ORAL NIGHTLY
Qty: 90 TABLET | Refills: 1 | OUTPATIENT
Start: 2024-10-17

## 2024-10-17 NOTE — TELEPHONE ENCOUNTER
Care Due:                  Date            Visit Type   Department     Provider  --------------------------------------------------------------------------------                                EP -                              PRIMARY      [facility] INTERNAL  Last Visit: 09-      CARE ([facility])   MARYSE Travis                              EP -                              PRIMARY      [facility] INTERNAL  Next Visit: 01-      CARE ([facility])   MARYSE Travis                                                            Last  Test          Frequency    Reason                     Performed    Due Date  --------------------------------------------------------------------------------    Lipid Panel.  12 months..  simvastatin..............  01- 01-    Health [facility] Embedded Care Due Messages. Reference number: [id number].   10/17/2024 12:31:11 AM CDT

## 2024-10-17 NOTE — TELEPHONE ENCOUNTER
Provider Staff:  Action required for this patient    Requires labs      Please see care gap opportunities below in Care Due Message.    Thanks!  [facility]     Appointments      Date Provider   Last Visit   9/24/2024 Nerissa Travis MD   Next Visit   1/2/2025 Nerissa Travis MD     Refill Decision Note   Torie Richard  is requesting a refill authorization.  Brief Assessment and Rationale for Refill:  Quick Discontinue     Medication Therapy Plan:  FOVS; discontinued on 9/24/2024 by Nerissa Travis MD for the following reason: Therapy completed.      Comments:     Note composed:8:03 AM 10/17/2024

## 2024-10-30 ENCOUNTER — HOSPITAL ENCOUNTER (OUTPATIENT)
Dept: RADIOLOGY | Facility: HOSPITAL | Age: 79
Discharge: HOME | End: 2024-10-30
Attending: STUDENT IN AN ORGANIZED HEALTH CARE EDUCATION/TRAINING PROGRAM
Payer: MEDICARE

## 2024-10-30 VITALS — BODY MASS INDEX: 20.6 KG/M2 | WEIGHT: 120 LBS

## 2024-10-30 DIAGNOSIS — Z12.31 ENCOUNTER FOR SCREENING MAMMOGRAM FOR BREAST CANCER: ICD-10-CM

## 2024-10-30 PROCEDURE — 77063 BREAST TOMOSYNTHESIS BI: CPT | Mod: TC

## 2024-12-31 DIAGNOSIS — E78.2 MIXED HYPERLIPIDEMIA: Primary | ICD-10-CM

## 2024-12-31 RX ORDER — AMLODIPINE BESYLATE 2.5 MG/1
2.5 TABLET ORAL
Qty: 90 TABLET | Refills: 2 | Status: SHIPPED | OUTPATIENT
Start: 2024-12-31

## 2024-12-31 NOTE — TELEPHONE ENCOUNTER
Provider Staff:  Action required for this patient    Requires labs      Please see care gap opportunities below in Care Due Message.    Thanks!  [facility]     Appointments      Date Provider   Last Visit   9/24/2024 Nerissa Travis MD   Next Visit   1/2/2025 Nerissa Travis MD     Refill Decision Note   Torie Richard  is requesting a refill authorization.    Brief Assessment and Rationale for Refill:   Approve       Medication Therapy Plan:          Comments:     Note composed:2:58 PM 12/31/2024

## 2024-12-31 NOTE — TELEPHONE ENCOUNTER
Care Due:                  Date            Visit Type   Department     Provider  --------------------------------------------------------------------------------                                EP -                              PRIMARY      [facility] INTERNAL  Last Visit: 09-      CARE ([facility])   MARYSE Travis                              EP -                              PRIMARY      [facility] INTERNAL  Next Visit: 01-      CARE ([facility])   MARYSE Travis                                                            Last  Test          Frequency    Reason                     Performed    Due Date  --------------------------------------------------------------------------------    Lipid Panel.  12 months..  simvastatin..............  01- 01-    Health [facility] Embedded Care Due Messages. Reference number: [id number].   12/31/2024 12:37:52 AM CST

## 2025-01-02 ENCOUNTER — OFFICE VISIT (OUTPATIENT)
Dept: INTERNAL MEDICINE | Facility: CLINIC | Age: 80
End: 2025-01-02
Payer: MEDICARE

## 2025-01-02 ENCOUNTER — LAB VISIT (OUTPATIENT)
Dept: LAB | Facility: OTHER | Age: 80
End: 2025-01-02
Attending: STUDENT IN AN ORGANIZED HEALTH CARE EDUCATION/TRAINING PROGRAM
Payer: MEDICARE

## 2025-01-02 VITALS
OXYGEN SATURATION: 99 % | HEIGHT: 64 IN | WEIGHT: 121.5 LBS | HEART RATE: 64 BPM | BODY MASS INDEX: 20.74 KG/M2 | SYSTOLIC BLOOD PRESSURE: 120 MMHG | DIASTOLIC BLOOD PRESSURE: 70 MMHG

## 2025-01-02 DIAGNOSIS — E55.9 VITAMIN D DEFICIENCY: ICD-10-CM

## 2025-01-02 DIAGNOSIS — R73.09 OTHER ABNORMAL GLUCOSE: ICD-10-CM

## 2025-01-02 DIAGNOSIS — E53.8 VITAMIN B12 DEFICIENCY: ICD-10-CM

## 2025-01-02 DIAGNOSIS — E78.2 MIXED HYPERLIPIDEMIA: ICD-10-CM

## 2025-01-02 DIAGNOSIS — R41.3 MEMORY LOSS: ICD-10-CM

## 2025-01-02 DIAGNOSIS — I10 PRIMARY HYPERTENSION: ICD-10-CM

## 2025-01-02 DIAGNOSIS — Z00.00 HEALTH MAINTENANCE EXAMINATION: Primary | ICD-10-CM

## 2025-01-02 DIAGNOSIS — Z00.00 HEALTH MAINTENANCE EXAMINATION: ICD-10-CM

## 2025-01-02 DIAGNOSIS — G47.00 INSOMNIA, UNSPECIFIED TYPE: ICD-10-CM

## 2025-01-02 DIAGNOSIS — E04.1 THYROID NODULE: ICD-10-CM

## 2025-01-02 LAB
25(OH)D3+25(OH)D2 SERPL-MCNC: 53 NG/ML (ref 30–96)
ALBUMIN SERPL BCP-MCNC: 3.9 G/DL (ref 3.5–5.2)
ALP SERPL-CCNC: 60 U/L (ref 40–150)
ALT SERPL W/O P-5'-P-CCNC: 16 U/L (ref 10–44)
ANION GAP SERPL CALC-SCNC: 10 MMOL/L (ref 8–16)
AST SERPL-CCNC: 15 U/L (ref 10–40)
BASOPHILS # BLD AUTO: 0.05 K/UL (ref 0–0.2)
BASOPHILS NFR BLD: 0.8 % (ref 0–1.9)
BILIRUB SERPL-MCNC: 0.4 MG/DL (ref 0.1–1)
BUN SERPL-MCNC: 10 MG/DL (ref 8–23)
CALCIUM SERPL-MCNC: 9.9 MG/DL (ref 8.7–10.5)
CHLORIDE SERPL-SCNC: 107 MMOL/L (ref 95–110)
CHOLEST SERPL-MCNC: 158 MG/DL (ref 120–199)
CHOLEST/HDLC SERPL: 2.7 {RATIO} (ref 2–5)
CO2 SERPL-SCNC: 26 MMOL/L (ref 23–29)
CREAT SERPL-MCNC: 0.9 MG/DL (ref 0.5–1.4)
DIFFERENTIAL METHOD BLD: ABNORMAL
EOSINOPHIL # BLD AUTO: 0.2 K/UL (ref 0–0.5)
EOSINOPHIL NFR BLD: 3.5 % (ref 0–8)
ERYTHROCYTE [DISTWIDTH] IN BLOOD BY AUTOMATED COUNT: 13 % (ref 11.5–14.5)
EST. GFR  (NO RACE VARIABLE): >60 ML/MIN/1.73 M^2
ESTIMATED AVG GLUCOSE: 105 MG/DL (ref 68–131)
GLUCOSE SERPL-MCNC: 142 MG/DL (ref 70–110)
HBA1C MFR BLD: 5.3 % (ref 4–5.6)
HCT VFR BLD AUTO: 38.4 % (ref 37–48.5)
HDLC SERPL-MCNC: 59 MG/DL (ref 40–75)
HDLC SERPL: 37.3 % (ref 20–50)
HGB BLD-MCNC: 12.8 G/DL (ref 12–16)
IMM GRANULOCYTES # BLD AUTO: 0.02 K/UL (ref 0–0.04)
IMM GRANULOCYTES NFR BLD AUTO: 0.3 % (ref 0–0.5)
LDLC SERPL CALC-MCNC: 77.6 MG/DL (ref 63–159)
LYMPHOCYTES # BLD AUTO: 1.8 K/UL (ref 1–4.8)
LYMPHOCYTES NFR BLD: 29.1 % (ref 18–48)
MCH RBC QN AUTO: 31.6 PG (ref 27–31)
MCHC RBC AUTO-ENTMCNC: 33.3 G/DL (ref 32–36)
MCV RBC AUTO: 95 FL (ref 82–98)
MONOCYTES # BLD AUTO: 0.4 K/UL (ref 0.3–1)
MONOCYTES NFR BLD: 5.9 % (ref 4–15)
NEUTROPHILS # BLD AUTO: 3.8 K/UL (ref 1.8–7.7)
NEUTROPHILS NFR BLD: 60.4 % (ref 38–73)
NONHDLC SERPL-MCNC: 99 MG/DL
NRBC BLD-RTO: 0 /100 WBC
PLATELET # BLD AUTO: 170 K/UL (ref 150–450)
PMV BLD AUTO: 12 FL (ref 9.2–12.9)
POTASSIUM SERPL-SCNC: 4.3 MMOL/L (ref 3.5–5.1)
PROT SERPL-MCNC: 6.5 G/DL (ref 6–8.4)
RBC # BLD AUTO: 4.05 M/UL (ref 4–5.4)
SODIUM SERPL-SCNC: 143 MMOL/L (ref 136–145)
TRIGL SERPL-MCNC: 107 MG/DL (ref 30–150)
TSH SERPL DL<=0.005 MIU/L-ACNC: 0.62 UIU/ML (ref 0.4–4)
VIT B12 SERPL-MCNC: 1092 PG/ML (ref 210–950)
WBC # BLD AUTO: 6.25 K/UL (ref 3.9–12.7)

## 2025-01-02 PROCEDURE — 80053 COMPREHEN METABOLIC PANEL: CPT | Performed by: STUDENT IN AN ORGANIZED HEALTH CARE EDUCATION/TRAINING PROGRAM

## 2025-01-02 PROCEDURE — 82306 VITAMIN D 25 HYDROXY: CPT | Performed by: STUDENT IN AN ORGANIZED HEALTH CARE EDUCATION/TRAINING PROGRAM

## 2025-01-02 PROCEDURE — 82607 VITAMIN B-12: CPT | Performed by: STUDENT IN AN ORGANIZED HEALTH CARE EDUCATION/TRAINING PROGRAM

## 2025-01-02 PROCEDURE — 80061 LIPID PANEL: CPT | Performed by: STUDENT IN AN ORGANIZED HEALTH CARE EDUCATION/TRAINING PROGRAM

## 2025-01-02 PROCEDURE — 83036 HEMOGLOBIN GLYCOSYLATED A1C: CPT | Performed by: STUDENT IN AN ORGANIZED HEALTH CARE EDUCATION/TRAINING PROGRAM

## 2025-01-02 PROCEDURE — 99999 PR PBB SHADOW E&M-EST. PATIENT-LVL V: CPT | Mod: PBBFAC,,, | Performed by: STUDENT IN AN ORGANIZED HEALTH CARE EDUCATION/TRAINING PROGRAM

## 2025-01-02 PROCEDURE — 85025 COMPLETE CBC W/AUTO DIFF WBC: CPT | Performed by: STUDENT IN AN ORGANIZED HEALTH CARE EDUCATION/TRAINING PROGRAM

## 2025-01-02 PROCEDURE — 84443 ASSAY THYROID STIM HORMONE: CPT | Performed by: STUDENT IN AN ORGANIZED HEALTH CARE EDUCATION/TRAINING PROGRAM

## 2025-01-02 NOTE — PROGRESS NOTES
Subjective:       Patient ID: Torie Richard is a 79 y.o. female.    Chief Complaint: Health maintenance examination [Z00.00]    Patient is established with me, here today for the following:    HTN, HLD, osteopenia, vitamin D deficiency, insomnia    History of Present Illness      MEMORY CONCERNS:  She reports rapid onset of memory issues, including forgetting the location of frequently used items, which causes her frustration. She has not undergone neuropsychological testing and finds virtual appointments challenging. Her older brother, age 84, and other older siblings have memory issues.    NEUROLOGIC:  She experiences intermittent positional dizziness that resolves quickly when she stops activity and slows down.    WEIGHT MANAGEMENT:  She reports weight has increased to 120 lbs and expresses satisfaction with the weight gain.    MEDICATIONS:  She is currently taking Mirtazapine, vitamin D, and B12.    SURGICAL HISTORY:  Past surgical history includes gallbladder surgery.    FAMILY HISTORY:  Her grandson has been diagnosed with bipolar disorder.      ROS:  General: +weight gain  Neurological: +dizziness  Psychiatric: +memory problems, +forgetfulness        Health maintenance -   Cologuard negative FEB2022.   Denies family history of colorectal cancer.  Mammogram BI-RADS 1 in OCT2024.   Denies history of abnormal mammogram.  Family history of breast cancer.  Denies family history of ovarian cancer.  Completed pap screening.  Denies history of abnormal pap smear.  Family history of cardiac disease.  UTD on Tdap, COVID, PPSV23, PCV13, shingles, RSV, influenza vaccinations.  Started smoking at age 22, at most <0.5 PPD. Stopped smoking in 1981.  Denies drug use.  Completed hepatitis C screening.  Due for diabetes screening.  Lab Results   Component Value Date    HGBA1C 5.3 01/08/2024     HTN -   Currently prescribed amlodipine, losartan.  Due for micro albumin creatinine ratio.   Lab Results   Component Value Date  "   MICALBCREAT 5.8 01/08/2024     BP Readings from Last 5 Encounters:   09/24/24 116/72   09/20/24 120/62   06/05/24 (!) 121/59   04/17/24 122/72   01/31/24 119/64     HLD -   Endorses taking simvastatin as directed  Lab Results   Component Value Date    CHOL 164 01/08/2024     Lab Results   Component Value Date    TRIG 121 01/08/2024     Lab Results   Component Value Date    LDLCALC 84.8 01/08/2024     Lab Results   Component Value Date    HDL 55 01/08/2024   Due for lipid recheck.     Osteopenia -   Vitamin D deficiency -   Completed DEXA in JULY2023.  Showed osteopenia.  "Ten year fracture probability:  Major osteoporotic 10.9%, hip 2.1%."  Denies family history of osteoporosis.  Currently taking vitamin D3 supplementation daily.   Lab Results   Component Value Date    [id number] 33 01/08/2024      Vitamin B12 deficiency -  Currently taking vitamin B12 500 mcg daily supplementation.  Lab Results   Component Value Date    [id number] >2000 (H) 07/02/2024              Insomnia better with mirtazapine   Has also noticed improved weight gain    Wt Readings from Last 10 Encounters:   01/02/25 55.1 kg (121 lb 7.6 oz)   10/30/24 54.4 kg (120 lb)   09/24/24 53.2 kg (117 lb 4.6 oz)   09/20/24 52.6 kg (116 lb)   06/05/24 56.8 kg (125 lb 3.5 oz)   04/17/24 57.8 kg (127 lb 6.8 oz)   01/08/24 60.7 kg (133 lb 13.1 oz)   12/31/23 61.7 kg (136 lb)   09/19/23 62 kg (136 lb 11 oz)   07/05/23 63.9 kg (140 lb 14 oz)      Current Outpatient Medications   Medication Instructions    acetaminophen (TYLENOL) 500 mg, Every 6 hours PRN    amLODIPine (NORVASC) 2.5 mg, Oral    cyanocobalamin (VITAMIN B-12) 500 mcg, Daily    ibuprofen (ADVIL,MOTRIN) 200 mg, Every 6 hours PRN    losartan (COZAAR) 100 mg, Oral, Daily    magnesium oxide 200 mg, Oral, Nightly    melatonin (MELATIN) 5 mg    mirtazapine (REMERON) 7.5 mg, Oral, Nightly    simvastatin (ZOCOR) 20 MG tablet TAKE 1 TABLET BY MOUTH EVERY DAY IN THE EVENING    vitamin D (VITAMIN D3) " "1,000 Units, Daily     Objective:      Vitals:    01/02/25 0909   BP: 120/70   Pulse: 64   SpO2: 99%   Weight: 55.1 kg (121 lb 7.6 oz)   Height: 5' 4" (1.626 m)   PainSc: 0-No pain     Body mass index is 20.85 kg/m².    Physical Exam  Vitals reviewed.   Constitutional:       General: She is not in acute distress.     Appearance: Normal appearance. She is not ill-appearing or diaphoretic.   HENT:      Head: Normocephalic and atraumatic.      Right Ear: Tympanic membrane, ear canal and external ear normal. There is no impacted cerumen.      Left Ear: Tympanic membrane, ear canal and external ear normal. There is no impacted cerumen.      Nose: Nose normal. No rhinorrhea.      Mouth/Throat:      Mouth: Mucous membranes are moist.      Pharynx: Oropharynx is clear. No oropharyngeal exudate or posterior oropharyngeal erythema.   Eyes:      General: No scleral icterus.        Right eye: No discharge.         Left eye: No discharge.      Conjunctiva/sclera: Conjunctivae normal.   Neck:      Thyroid: Thyroid mass present. No thyromegaly or thyroid tenderness.      Trachea: Trachea normal.   Cardiovascular:      Rate and Rhythm: Normal rate and regular rhythm.      Heart sounds: Normal heart sounds. No murmur heard.     No friction rub. No gallop.   Pulmonary:      Effort: Pulmonary effort is normal. No respiratory distress.      Breath sounds: Normal breath sounds. No stridor. No wheezing, rhonchi or rales.   Abdominal:      General: There is no distension.      Palpations: Abdomen is soft.      Tenderness: There is no abdominal tenderness. There is no guarding or rebound.   Musculoskeletal:         General: No swelling or deformity.      Cervical back: Neck supple.   Lymphadenopathy:      Head:      Right side of head: No submandibular or posterior auricular adenopathy.      Left side of head: No submandibular or posterior auricular adenopathy.      Cervical: No cervical adenopathy.      Right cervical: No superficial, " deep or posterior cervical adenopathy.     Left cervical: No superficial, deep or posterior cervical adenopathy.      Upper Body:      Right upper body: No supraclavicular adenopathy.      Left upper body: No supraclavicular adenopathy.   Skin:     General: Skin is warm and dry.   Neurological:      General: No focal deficit present.      Mental Status: She is alert. Mental status is at baseline.      Gait: Gait normal.   Psychiatric:         Mood and Affect: Mood normal.         Behavior: Behavior normal.         Assessment:       1. Health maintenance examination    2. Primary hypertension    3. Mixed hyperlipidemia    4. Other abnormal glucose    5. Vitamin B12 deficiency    6. Vitamin D deficiency    7. Insomnia, unspecified type    8. Memory loss    9. Thyroid nodule        Plan:   Health maintenance examination  -     Microalbumin/Creatinine Ratio, Urine; Future; Expected date: 01/02/2025  -     Comprehensive Metabolic Panel; Future; Expected date: 01/02/2025  -     TSH; Future; Expected date: 01/02/2025  -     Lipid Panel; Future; Expected date: 01/02/2025  -     Hemoglobin A1C; Future; Expected date: 01/02/2025  -     CBC Auto Differential; Future; Expected date: 01/02/2025  -     Vitamin B12; Future; Expected date: 01/02/2025  -     Vitamin D; Future; Expected date: 01/02/2025    Primary hypertension  -     Comprehensive Metabolic Panel; Future; Expected date: 01/02/2025  -     TSH; Future; Expected date: 01/02/2025  -     CBC Auto Differential; Future; Expected date: 01/02/2025    Mixed hyperlipidemia  -     Lipid Panel; Future; Expected date: 01/02/2025    Other abnormal glucose  -     Hemoglobin A1C; Future; Expected date: 01/02/2025    Vitamin B12 deficiency  -     Vitamin B12; Future; Expected date: 01/02/2025    Vitamin D deficiency  -     Vitamin D; Future; Expected date: 01/02/2025    Insomnia, unspecified type    Memory loss  -     Ambulatory referral/consult to Adult Neuropsychology; Future;  Expected date: 01/09/2025    Thyroid nodule  -     US Thyroid; Future; Expected date: 01/02/2025      Assessment & Plan    IMPRESSION:  - Recommend neuropsychological testing to evaluate progressive memory issues  - Considered thyroid ultrasound due to palpable right-sided nodule on exam  - Noted weight gain of 4 lbs since September, viewed positively given previous concerns about low BMI  - Reviewed CT results: small lung nodule deemed not concerning given patient's very remote smoking history, slightly enlarged biliary duct likely post-surgical change, and uterine fibroids noted as incidental finding  - Assessed dizziness as intermittent and quickly resolving, not currently requiring intervention  - Determined patient no longer needs routine colorectal cancer screening or mammograms due to age and previous normal results    MEMORY CONCERNS:  - Discussed normal age-related memory changes versus concerning symptoms warranting further evaluation.  - Noted patient's report of significant memory problems, including forgetting locations of common household items.  - Acknowledged the severity and rapid onset of memory issues.  - Recommend comprehensive neuropsychological testing to evaluate the extent of cognitive impairment.  - Explained the comprehensive nature of the evaluation, including review of labs, imaging, cognitive testing, and neurologist consultation.  - Referred patient to neuropsychology at [facility] for comprehensive memory evaluation.  - Advised follow up in 6 months, or sooner if memory issues progress rapidly.  - Instructed patient to contact office if any concerning changes occur before next appointment.    DIZZINESS:  - Noted patient's report of occasional, brief episodes of dizziness.  - Confirmed that dizziness resolves quickly and patient is able to continue activities.  - Assessed fall risk and recommended precautions.  - Instructed patient to use support when transitioning from sitting to  standing positions to prevent falls.    THYROID NODULE:  - Palpated thyroid and detected a nodule on the right side.  - Explained that thyroid nodules are often benign but require imaging for proper assessment.  - Ordered thyroid ultrasound to evaluate palpable right-sided nodule.    WEIGHT MANAGEMENT:  - Noted patient's weight increase of 4 lbs since September.  - Evaluated current BMI at 20 and considered it a positive change.  - Explained benefits of maintaining BMI at higher end of normal range for older women, particularly for recovery from major illnesses.  - Recommend maintaining current weight or slight increase.  - Advised follow up in 6 months, or sooner if there are significant changes in weight or other symptoms.    LUNG NODULE:  - Noted CT revealed a small lung nodule.  - Evaluated the lung nodule as not concerning due to patient's non-smoking status and small size.    ENLARGED BILE DUCT:  - Noted CT showed slight enlargement of the duct between the gallbladder and intestines.  - Evaluated the duct enlargement and normal liver function testing.    UTERINE FIBROIDS:  - Noted CT revealed fibroids in the uterus.  - Evaluated fibroids as common and not concerning if asymptomatic.    FAMILY STRESS:  - Noted patient's grandson has been diagnosed with bipolar disorder and is staying with the patient.  - Acknowledged the challenges of managing bipolar disorder for both the patient and family.  - Noted grandson is receiving weekly therapy and medication for bipolar disorder.  - Noted patient's report of family issues, including her daughter kicking out her grandson and the need to keep information from her daughter.  - Acknowledged the difficulty of keeping information from family members.  - Noted patient's report of experiencing a stressful year.    MEDICATIONS/SUPPLEMENTS:  - Continued mirtazapine at current dose.  - Continued vitamin D and B12 supplements.    OTHER INSTRUCTIONS:  - Torie to continue  physical activity, including walking program around the neighborhood.    LABS:  - Ordered CBC, cholesterol, blood sugar, and blood counts as routine labs.    42 minutes were spent in chart review, documentation and review of results, and evaluation, treatment, and counseling of patient on the same day of service.    Nerissa Travis MD  1/2/2025

## 2025-01-03 ENCOUNTER — HOSPITAL ENCOUNTER (OUTPATIENT)
Dept: RADIOLOGY | Facility: OTHER | Age: 80
Discharge: HOME | End: 2025-01-03
Attending: STUDENT IN AN ORGANIZED HEALTH CARE EDUCATION/TRAINING PROGRAM
Payer: MEDICARE

## 2025-01-03 DIAGNOSIS — E04.1 THYROID NODULE: ICD-10-CM

## 2025-01-03 PROCEDURE — 76536 US EXAM OF HEAD AND NECK: CPT | Mod: TC

## 2025-01-03 PROCEDURE — 76536 US EXAM OF HEAD AND NECK: CPT | Mod: 26,,, | Performed by: RADIOLOGY

## 2025-03-01 DIAGNOSIS — G47.00 INSOMNIA, UNSPECIFIED TYPE: ICD-10-CM

## 2025-03-01 RX ORDER — MIRTAZAPINE 7.5 MG/1
7.5 TABLET, FILM COATED ORAL NIGHTLY
Qty: 90 TABLET | Refills: 3 | Status: SHIPPED | OUTPATIENT
Start: 2025-03-01

## 2025-03-01 NOTE — TELEPHONE ENCOUNTER
No care due was identified.  Health [facility] Embedded Care Due Messages. Reference number: [id number].   3/01/2025 7:34:46 AM CST

## 2025-03-01 NOTE — TELEPHONE ENCOUNTER
Refill Decision Note   Torie Richard  is requesting a refill authorization.  Brief Assessment and Rationale for Refill:  Approve     Medication Therapy Plan:       Medication Reconciliation Completed: No   Comments:     No Care Gaps recommended.     Note composed:11:29 AM 03/01/2025

## 2025-03-03 RX ORDER — SIMVASTATIN 20 MG/1
20 TABLET, FILM COATED ORAL NIGHTLY
Qty: 90 TABLET | Refills: 3 | Status: SHIPPED | OUTPATIENT
Start: 2025-03-03

## 2025-03-03 NOTE — TELEPHONE ENCOUNTER
Refill Decision Note   Torie Rossilly  is requesting a refill authorization.  Brief Assessment and Rationale for Refill:  Approve     Medication Therapy Plan:         Comments:     Note composed:4:27 AM 03/03/2025

## 2025-03-03 NOTE — TELEPHONE ENCOUNTER
No care due was identified.  Health [facility] Embedded Care Due Messages. Reference number: [id number].   3/03/2025 12:14:00 AM CST

## 2025-03-24 DIAGNOSIS — Z00.00 ENCOUNTER FOR MEDICARE ANNUAL WELLNESS EXAM: ICD-10-CM

## 2025-03-31 ENCOUNTER — PATIENT MESSAGE (OUTPATIENT)
Dept: ADMINISTRATIVE | Facility: HOSPITAL | Age: 80
End: 2025-03-31
Payer: MEDICARE

## 2025-03-31 ENCOUNTER — TELEPHONE (OUTPATIENT)
Dept: ADMINISTRATIVE | Facility: HOSPITAL | Age: 80
End: 2025-03-31
Payer: MEDICARE

## 2025-03-31 VITALS — SYSTOLIC BLOOD PRESSURE: 132 MMHG | DIASTOLIC BLOOD PRESSURE: 68 MMHG

## 2025-05-06 ENCOUNTER — OFFICE VISIT (OUTPATIENT)
Dept: INTERNAL MEDICINE | Facility: CLINIC | Age: 80
End: 2025-05-06
Payer: MEDICARE

## 2025-05-06 VITALS
OXYGEN SATURATION: 99 % | SYSTOLIC BLOOD PRESSURE: 124 MMHG | HEIGHT: 64 IN | WEIGHT: 125.44 LBS | DIASTOLIC BLOOD PRESSURE: 82 MMHG | HEART RATE: 62 BPM | BODY MASS INDEX: 21.42 KG/M2

## 2025-05-06 DIAGNOSIS — G89.29 CHRONIC LOW BACK PAIN WITH SCIATICA, SCIATICA LATERALITY UNSPECIFIED, UNSPECIFIED BACK PAIN LATERALITY: ICD-10-CM

## 2025-05-06 DIAGNOSIS — R41.3 MEMORY LOSS: ICD-10-CM

## 2025-05-06 DIAGNOSIS — E78.2 MIXED HYPERLIPIDEMIA: Chronic | ICD-10-CM

## 2025-05-06 DIAGNOSIS — I10 PRIMARY HYPERTENSION: ICD-10-CM

## 2025-05-06 DIAGNOSIS — K80.50 CALCULUS OF BILE DUCT WITHOUT CHOLECYSTITIS AND WITHOUT OBSTRUCTION: ICD-10-CM

## 2025-05-06 DIAGNOSIS — G47.00 INSOMNIA, UNSPECIFIED TYPE: ICD-10-CM

## 2025-05-06 DIAGNOSIS — Z12.83 SKIN CANCER SCREENING: ICD-10-CM

## 2025-05-06 DIAGNOSIS — M67.88 ACHILLES TENDONOSIS OF RIGHT LOWER EXTREMITY: ICD-10-CM

## 2025-05-06 DIAGNOSIS — M85.89 OSTEOPENIA OF MULTIPLE SITES: ICD-10-CM

## 2025-05-06 DIAGNOSIS — M54.40 CHRONIC LOW BACK PAIN WITH SCIATICA, SCIATICA LATERALITY UNSPECIFIED, UNSPECIFIED BACK PAIN LATERALITY: ICD-10-CM

## 2025-05-06 DIAGNOSIS — D69.2 SENILE PURPURA: ICD-10-CM

## 2025-05-06 DIAGNOSIS — E53.8 VITAMIN B12 DEFICIENCY: ICD-10-CM

## 2025-05-06 DIAGNOSIS — L71.9 ROSACEA: ICD-10-CM

## 2025-05-06 DIAGNOSIS — Z74.09 OTHER REDUCED MOBILITY: ICD-10-CM

## 2025-05-06 DIAGNOSIS — Z00.00 ENCOUNTER FOR MEDICARE ANNUAL WELLNESS EXAM: Primary | ICD-10-CM

## 2025-05-06 DIAGNOSIS — E55.9 VITAMIN D DEFICIENCY: ICD-10-CM

## 2025-05-06 PROCEDURE — 99999 PR PBB SHADOW E&M-EST. PATIENT-LVL V: CPT | Mod: PBBFAC,,,

## 2025-05-06 NOTE — PATIENT INSTRUCTIONS
Counseling and Referral of Other Preventative  (Italic type indicates deductible and co-insurance are waived)    Patient Name: Torie Richard  Today's Date: 5/6/2025    Health Maintenance       Date Due Completion Date    Mammogram 10/30/2025 10/30/2024    DEXA Scan 07/11/2026 7/11/2023    TETANUS VACCINE 03/21/2028 3/21/2018    Lipid Panel 01/02/2030 1/2/2025        Orders Placed This Encounter   Procedures    Ambulatory referral/consult to Adult Neuropsychology    Ambulatory referral/consult to Dermatology     The following information is provided to all patients.  This information is to help you find resources for any of the problems found today that may be affecting your health:                  Living healthy guide: www.[facility].[place].gov      Understanding Diabetes: www.diabetes.org      Eating healthy: www.cdc.gov/healthyweight      CDC home safety checklist: www.cdc.gov/steadi/patient.html      Agency on Aging: www.goea.[place].[facility]      Alcoholics anonymous (AA): www.aa.org      Physical Activity: www.basilia.nih.gov/ig8gpwj      Tobacco use: www.quitwithusla.org

## 2025-05-06 NOTE — PROGRESS NOTES
"  Torie Richard presented for a follow-up Medicare AWV today. The following components were reviewed and updated:    Medical history  Family History  Social history  Allergies and Current Medications  Health Risk Assessment  Health Maintenance  Care Team    **See Completed Assessments for Annual Wellness visit with in the encounter summary    The following assessments were completed:  Depression Screening  Cognitive function Screening  Timed Get Up Test  Whisper Test            Opioid documentation:      Patient does not have a current opioid prescription.          Vitals:    05/06/25 0917   BP: 124/82   BP Location: Left arm   Patient Position: Sitting   Pulse: 62   SpO2: 99%   Weight: 56.9 kg (125 lb 7.1 oz)   Height: 5' 4" (1.626 m)     Body mass index is 21.53 kg/m².       Physical Exam  Constitutional:       General: She is not in acute distress.     Appearance: She is not ill-appearing.   Cardiovascular:      Rate and Rhythm: Normal rate.      Pulses: Normal pulses.      Heart sounds: No murmur heard.  Pulmonary:      Effort: Pulmonary effort is normal. No respiratory distress.      Breath sounds: Normal breath sounds.   Abdominal:      General: Bowel sounds are normal.      Palpations: Abdomen is soft.      Tenderness: There is no abdominal tenderness.   Musculoskeletal:         General: Normal range of motion.   Skin:     General: Skin is warm.   Neurological:      General: No focal deficit present.      Mental Status: She is alert and oriented to person, place, and time.   Psychiatric:         Mood and Affect: Mood normal.           Diagnoses and health risks identified today and associated recommendations/orders:  1. Encounter for Medicare annual wellness exam  All age-related screenings and hm measures reviewed with patient.   - Referral to Enhanced Annual Wellness Visit (eAWV) M+1    2. Memory loss  Stable. Referral to NeuroPsych. Pt states she previously had an appt scheduled but she chose to cancel " "because the "virtual" aspect made her nervous. Referral placed again to ask about in-person option. Encouraged regular activity.  - Ambulatory referral/consult to Adult Neuropsychology; Future    3. Skin cancer screening  Stable. Referral to derm for annual skin screen  - Ambulatory referral/consult to Dermatology; Future    4. Rosacea  Stable. No meds.     5. Mixed hyperlipidemia  Stable. On simvastatin.     6. Primary hypertension  Stable. On losartan    7. Senile purpura  Stable. No complications. Photo above.    8. Vitamin B12 deficiency  Stable. On b12    9. Vitamin D deficiency  Stable. On vit d    10. Calculus of bile duct without cholecystitis and without obstruction  Stable. No complications. Pcp monitoring imaging.    11. Chronic low back pain with sciatica, sciatica laterality unspecified, unspecified back pain laterality  Stable. Ibu/tylenol prn.    12. Achilles tendonosis of right lower extremity  Stable. Ibu/tylenol/prn, stretching exercises, rest.    13. Osteopenia of multiple sites  Stable. Recommend calcium+ vit d supplementation, regular exercise.     14. Insomnia, unspecified type  Stable. Using melatonin.    15. Other reduced mobility  Stable. Recommend regular exercise as tolerated.      Provided Torie with a 5-10 year written screening schedule and personal prevention plan. Recommendations were developed using the USPSTF age appropriate recommendations. Education, counseling, and referrals were provided as needed.  After Visit Summary printed and given to patient which includes a list of additional screenings\tests needed.    Follow up in about 1 year (around 5/6/2026) for Annual Medicare Wellness Visit.      Izzy Soto NP  I offered to discuss advanced care planning, including how to pick a person who would make decisions for you if you were unable to make them for yourself, called a health care power of , and what kind of decisions you might make such as use of life sustaining " treatments such as ventilators and tube feeding when faced with a life limiting illness recorded on a living will that they will need to know. (How you want to be cared for as you near the end of your natural life)     X Patient is interested in learning more about how to make advanced directives.  I provided them paperwork and offered to discuss this with them.

## 2025-07-02 ENCOUNTER — OFFICE VISIT (OUTPATIENT)
Dept: INTERNAL MEDICINE | Facility: CLINIC | Age: 80
End: 2025-07-02
Payer: MEDICARE

## 2025-07-02 VITALS
OXYGEN SATURATION: 99 % | HEART RATE: 69 BPM | DIASTOLIC BLOOD PRESSURE: 78 MMHG | BODY MASS INDEX: 22.43 KG/M2 | HEIGHT: 64 IN | WEIGHT: 131.38 LBS | SYSTOLIC BLOOD PRESSURE: 138 MMHG

## 2025-07-02 DIAGNOSIS — I10 PRIMARY HYPERTENSION: Primary | ICD-10-CM

## 2025-07-02 DIAGNOSIS — R41.3 MEMORY LOSS: ICD-10-CM

## 2025-07-02 PROCEDURE — 99213 OFFICE O/P EST LOW 20 MIN: CPT | Mod: S$GLB,,, | Performed by: COUNSELOR

## 2025-07-02 PROCEDURE — 1126F AMNT PAIN NOTED NONE PRSNT: CPT | Mod: CPTII,S$GLB,, | Performed by: COUNSELOR

## 2025-07-02 PROCEDURE — 3075F SYST BP GE 130 - 139MM HG: CPT | Mod: CPTII,S$GLB,, | Performed by: COUNSELOR

## 2025-07-02 PROCEDURE — 3078F DIAST BP <80 MM HG: CPT | Mod: CPTII,S$GLB,, | Performed by: COUNSELOR

## 2025-07-02 PROCEDURE — 3288F FALL RISK ASSESSMENT DOCD: CPT | Mod: CPTII,S$GLB,, | Performed by: COUNSELOR

## 2025-07-02 PROCEDURE — 1159F MED LIST DOCD IN RCRD: CPT | Mod: CPTII,S$GLB,, | Performed by: COUNSELOR

## 2025-07-02 PROCEDURE — 99999 PR PBB SHADOW E&M-EST. PATIENT-LVL III: CPT | Mod: PBBFAC,,, | Performed by: COUNSELOR

## 2025-07-02 PROCEDURE — 1101F PT FALLS ASSESS-DOCD LE1/YR: CPT | Mod: CPTII,S$GLB,, | Performed by: COUNSELOR

## 2025-07-02 RX ORDER — FLUTICASONE PROPIONATE 50 MCG
1 SPRAY, SUSPENSION (ML) NASAL DAILY
COMMUNITY
Start: 2025-02-01

## 2025-07-02 NOTE — PROGRESS NOTES
Subjective:       Patient ID: Torie Richard is a 79 y.o. female with a history of rosacea, HLD, HTN, vitamin-D deficiency, vitamin B12 deficiency, chronic low back pain, osteopenia, insomnia.    Chief Complaint: Primary hypertension [I10]    Patient is new to me, PCP is Dr. Nerissa Travis. Here today for the following:    History of Present Illness    CHIEF COMPLAINT:  Patient presents today for blood pressure follow up.    BLOOD PRESSURE:  She performs occasional home blood pressure monitoring with readings typically around 124/80. She continues amlodipine 2.5 mg and Losartan 100 mg, denying any concerns with current blood pressure management. She denies CP, SOB, peripheral edema, HA, dizziness, blurry vision, gait abnormalities.    MEMORY:  She reports progressive memory issues that are slightly worsening. She is uncertain about the status of previously recommended neuropsychological testing and has difficulty recalling specific details about the recommended testing. She denies getting lost in familiar places but struggled getting here today after having to take an unusual street due to construction.    EASY BRUISING:  She reports easy bruising with minimal trauma associated with physical labor.    PHYSICAL ACTIVITY:  She maintains front and back flower gardens through yard work. She intends to start daily walking but has been deterred by high temperatures. No structured exercise routine is currently established.    ROS:  Eyes: -vision changes,   Cardiovascular: -chest pain, -lower extremity edema  Respiratory: -shortness of breath  Skin: -rash, -lesion, +easy bruising  Neurological: -headache, -dizziness, -numbness, -tingling, +memory problems, +memory loss         Current Outpatient Medications   Medication Instructions    acetaminophen (TYLENOL) 500 mg, Every 6 hours PRN    amLODIPine (NORVASC) 2.5 mg, Oral    cyanocobalamin (VITAMIN B-12) 500 mcg, Daily    fluticasone propionate (FLONASE) 50 mcg/actuation  "nasal spray 1 spray, Daily    ibuprofen (ADVIL,MOTRIN) 200 mg, Every 6 hours PRN    losartan (COZAAR) 100 mg, Oral, Daily    magnesium oxide 200 mg, Oral, Nightly    melatonin (MELATIN) 5 mg    mirtazapine (REMERON) 7.5 mg, Oral, Nightly    simvastatin (ZOCOR) 20 mg, Oral, Nightly    vitamin D (VITAMIN D3) 1,000 Units, Daily     Objective:      Vitals:    07/02/25 0936 07/02/25 1030   BP: 138/80 138/78   Pulse: 69    SpO2: 99%    Weight: 59.6 kg (131 lb 6.3 oz)    Height: 5' 4" (1.626 m)    PainSc: 0-No pain      Body mass index is 22.55 kg/m².    Physical Exam  Constitutional:       General: She is not in acute distress.     Appearance: Normal appearance. She is well-developed and normal weight. She is not ill-appearing, toxic-appearing or diaphoretic.   HENT:      Head: Normocephalic and atraumatic.      Nose: Nose normal.   Eyes:      General: No scleral icterus.        Right eye: No discharge.         Left eye: No discharge.   Neck:      Thyroid: No thyromegaly.      Trachea: No tracheal deviation.   Cardiovascular:      Rate and Rhythm: Normal rate and regular rhythm.      Pulses: Normal pulses.      Heart sounds: Normal heart sounds. No murmur heard.     No friction rub. No gallop.   Pulmonary:      Effort: Pulmonary effort is normal. No respiratory distress.      Breath sounds: Normal breath sounds. No stridor. No wheezing, rhonchi or rales.   Musculoskeletal:         General: No deformity.      Right lower leg: No edema.      Left lower leg: No edema.   Skin:     General: Skin is warm and dry.      Findings: Bruising present. No erythema.      Comments: Some small purpura noted on dorsum of forearms. None with significant erythema, swelling.    Neurological:      Mental Status: She is alert and oriented to person, place, and time. Mental status is at baseline.      Gait: Gait normal.      Comments: Patient had difficulty remembering events from months ago.   Psychiatric:         Mood and Affect: Mood normal. "         Behavior: Behavior normal.         Thought Content: Thought content normal.         Judgment: Judgment normal.         Assessment:       1. Primary hypertension    2. Memory loss        IMPRESSION:  - /80 initially and 138/78 on recheck, with normal home BP readings (e.g., 124/80).  - Continued current antihypertensive regimen of amlodipine 2.5 mg daily and losartan, with consideration to increase amlodipine to 5 mg if needed, pending discussion with Dr. Travis.    Plan:     PLAN SUMMARY:  - Consider increasing amlodipine to 5 mg pending [employer] medicine program results  - Enrolled patient in [facility]'s digital medicine program  - Advised to maintain current level of physical activity  - Continue current healthy diet with reduced salt intake  - Will follow up on status of pending neuropsychological testing referral  - Follow-up in 2 weeks if not enrolled in digital medicine program for BP readings    Primary hypertension  - Blood pressure today was 138/80, rechecked at 138/78.  - Patient reports home readings usually around 124/80.  - Currently managed with amlodipine 2.5 mg and Losartan 100 mg with a goal to maintain BP below 130/80.  - Enrolled patient in [facility]'s digital medicine program  - If not enrolled within 2 weeks, staff will reach out regarding BP readings.  - Meanwhile, patient should continue measuring BP at home and maintain current healthy diet with reduced salt intake.  - May consider increasing amlodipine to 5 mg pending results from digital medicine program.  -     Hypertension [employer] [facility] (HDMP) Enrollment Order    Memory loss  - Patient reports some memory problems but not as severe as brother's disorientation incident.  - No consistent getting lost in familiar places, no falls, and no problems walking.  - Neuropsychological testing has been recommended but not yet completed.  - Will follow up on status of pending referral.      This note was generated with the assistance of  ambient listening technology. Verbal consent was obtained by the patient and accompanying visitor(s) for the recording of patient appointment to facilitate this note. I attest to having reviewed and edited the generated note for accuracy, though some syntax or spelling errors may persist. Please contact the author of this note for any clarification.    Christophe Monge PA-C    7/2/2025

## 2025-07-14 ENCOUNTER — PATIENT MESSAGE (OUTPATIENT)
Dept: INTERNAL MEDICINE | Facility: CLINIC | Age: 80
End: 2025-07-14
Payer: MEDICARE

## 2025-07-21 ENCOUNTER — PATIENT MESSAGE (OUTPATIENT)
Dept: ADMINISTRATIVE | Facility: HOSPITAL | Age: 80
End: 2025-07-21
Payer: MEDICARE

## 2025-07-22 ENCOUNTER — PATIENT OUTREACH (OUTPATIENT)
Dept: ADMINISTRATIVE | Facility: HOSPITAL | Age: 80
End: 2025-07-22
Payer: MEDICARE

## 2025-07-22 DIAGNOSIS — Z78.0 POST-MENOPAUSAL: Primary | ICD-10-CM

## 2025-07-22 NOTE — PROGRESS NOTES
Health Maintenance Due   Topic Date Due    DEXA Scan  07/11/2025   Care everywhere updated  Links registry triggered   Campaigns message response to osteoporosis

## 2025-07-31 ENCOUNTER — HOSPITAL ENCOUNTER (OUTPATIENT)
Dept: RADIOLOGY | Facility: OTHER | Age: 80
Discharge: HOME | End: 2025-07-31
Attending: STUDENT IN AN ORGANIZED HEALTH CARE EDUCATION/TRAINING PROGRAM
Payer: MEDICARE

## 2025-07-31 DIAGNOSIS — Z78.0 POST-MENOPAUSAL: ICD-10-CM

## 2025-07-31 PROCEDURE — 77080 DXA BONE DENSITY AXIAL: CPT | Mod: 26,,, | Performed by: RADIOLOGY

## 2025-07-31 PROCEDURE — 77080 DXA BONE DENSITY AXIAL: CPT | Mod: TC

## 2025-09-03 ENCOUNTER — OFFICE VISIT (OUTPATIENT)
Dept: DERMATOLOGY | Facility: CLINIC | Age: 80
End: 2025-09-03
Payer: MEDICARE

## 2025-09-03 DIAGNOSIS — L82.1 SEBORRHEIC KERATOSES: ICD-10-CM

## 2025-09-03 DIAGNOSIS — L81.4 LENTIGINES: ICD-10-CM

## 2025-09-03 DIAGNOSIS — D22.9 MULTIPLE BENIGN NEVI: ICD-10-CM

## 2025-09-03 DIAGNOSIS — L82.0 INFLAMED SEBORRHEIC KERATOSIS: Primary | ICD-10-CM

## 2025-09-03 DIAGNOSIS — D36.10 NEUROFIBROMA: ICD-10-CM

## 2025-09-03 DIAGNOSIS — Z12.83 SCREENING EXAM FOR SKIN CANCER: ICD-10-CM

## 2025-09-03 PROCEDURE — 3288F FALL RISK ASSESSMENT DOCD: CPT | Mod: CPTII,S$GLB,, | Performed by: STUDENT IN AN ORGANIZED HEALTH CARE EDUCATION/TRAINING PROGRAM

## 2025-09-03 PROCEDURE — 99203 OFFICE O/P NEW LOW 30 MIN: CPT | Mod: 25,S$GLB,, | Performed by: STUDENT IN AN ORGANIZED HEALTH CARE EDUCATION/TRAINING PROGRAM

## 2025-09-03 PROCEDURE — 99999 PR PBB SHADOW E&M-EST. PATIENT-LVL III: CPT | Mod: PBBFAC,,, | Performed by: STUDENT IN AN ORGANIZED HEALTH CARE EDUCATION/TRAINING PROGRAM

## 2025-09-03 PROCEDURE — 1101F PT FALLS ASSESS-DOCD LE1/YR: CPT | Mod: CPTII,S$GLB,, | Performed by: STUDENT IN AN ORGANIZED HEALTH CARE EDUCATION/TRAINING PROGRAM

## 2025-09-03 PROCEDURE — 1159F MED LIST DOCD IN RCRD: CPT | Mod: CPTII,S$GLB,, | Performed by: STUDENT IN AN ORGANIZED HEALTH CARE EDUCATION/TRAINING PROGRAM

## 2025-09-03 PROCEDURE — 1160F RVW MEDS BY RX/DR IN RCRD: CPT | Mod: CPTII,S$GLB,, | Performed by: STUDENT IN AN ORGANIZED HEALTH CARE EDUCATION/TRAINING PROGRAM

## 2025-09-03 PROCEDURE — 1126F AMNT PAIN NOTED NONE PRSNT: CPT | Mod: CPTII,S$GLB,, | Performed by: STUDENT IN AN ORGANIZED HEALTH CARE EDUCATION/TRAINING PROGRAM

## 2025-09-03 PROCEDURE — 17110 DESTRUCTION B9 LES UP TO 14: CPT | Mod: S$GLB,,, | Performed by: STUDENT IN AN ORGANIZED HEALTH CARE EDUCATION/TRAINING PROGRAM

## 2025-09-05 RX ORDER — AMLODIPINE BESYLATE 2.5 MG/1
2.5 TABLET ORAL
Qty: 90 TABLET | Refills: 1 | Status: SHIPPED | OUTPATIENT
Start: 2025-09-05

## (undated) DEVICE — BAG TISS RETRV MONARCH 10MM

## (undated) DEVICE — TROCAR ENDOPATH XCEL 5MM 7.5CM

## (undated) DEVICE — TUBING HF INSUFFLATION W/ FLTR

## (undated) DEVICE — TROCAR ENDOPATH XCEL 5X75MM

## (undated) DEVICE — ADHESIVE DERMABOND ADVANCED

## (undated) DEVICE — CLIP HEMO-LOK ML

## (undated) DEVICE — BLADE SURG CARBON STEEL SZ11

## (undated) DEVICE — TRAY MINOR GEN SURG

## (undated) DEVICE — SUT 0 VICRYL / UR6 (J603)

## (undated) DEVICE — WARMER DRAPE STERILE LF

## (undated) DEVICE — TROCAR SPACEMAKER BLUNT 10MM

## (undated) DEVICE — ELECTRODE REM PLYHSV RETURN 9

## (undated) DEVICE — DRAPE ABDOMINAL TIBURON 14X11

## (undated) DEVICE — IRRIGATOR ENDOSCOPY DISP.

## (undated) DEVICE — NDL 18GA X1 1/2 REG BEVEL

## (undated) DEVICE — NDL HYPO REG 25G X 1 1/2

## (undated) DEVICE — SEE MEDLINE ITEM 152622

## (undated) DEVICE — SUT MCRYL PLUS 4-0 PS2 27IN

## (undated) DEVICE — SOL NS 1000CC

## (undated) DEVICE — KIT ANTIFOG

## (undated) DEVICE — SCISSOR 5MMX35CM DIRECT DRIVE

## (undated) DEVICE — DRAPE STERI INSTRUMENT 1018